# Patient Record
Sex: MALE | Race: WHITE | NOT HISPANIC OR LATINO | Employment: OTHER | URBAN - METROPOLITAN AREA
[De-identification: names, ages, dates, MRNs, and addresses within clinical notes are randomized per-mention and may not be internally consistent; named-entity substitution may affect disease eponyms.]

---

## 2023-01-31 PROBLEM — R73.01 IFG (IMPAIRED FASTING GLUCOSE): Status: ACTIVE | Noted: 2023-01-31

## 2023-01-31 PROBLEM — H61.20 CERUMEN IMPACTION: Status: ACTIVE | Noted: 2023-01-31

## 2023-01-31 PROBLEM — E66.3 OVERWEIGHT WITH BODY MASS INDEX (BMI) OF 28 TO 28.9 IN ADULT: Status: ACTIVE | Noted: 2023-01-31

## 2023-01-31 PROBLEM — R00.2 PALPITATIONS: Status: ACTIVE | Noted: 2023-01-31

## 2023-01-31 PROBLEM — G47.34 SLEEP-RELATED HYPOXIA: Status: ACTIVE | Noted: 2023-01-31

## 2023-01-31 PROBLEM — E78.2 ELEVATED TRIGLYCERIDES WITH HIGH CHOLESTEROL: Status: ACTIVE | Noted: 2023-01-31

## 2023-01-31 PROBLEM — J06.9 URTI (ACUTE UPPER RESPIRATORY INFECTION): Status: ACTIVE | Noted: 2023-01-31

## 2023-01-31 PROBLEM — G56.22 NEURITIS OF LEFT ULNAR NERVE: Status: ACTIVE | Noted: 2023-01-31

## 2023-01-31 PROBLEM — L70.9 ACNE: Status: ACTIVE | Noted: 2023-01-31

## 2023-01-31 PROBLEM — I25.84 CALCIFICATION OF CORONARY ARTERY: Status: ACTIVE | Noted: 2023-01-31

## 2023-01-31 PROBLEM — R93.1 AGATSTON CORONARY ARTERY CALCIUM SCORE BETWEEN 200 AND 399: Status: ACTIVE | Noted: 2023-01-31

## 2023-01-31 PROBLEM — R93.89 ABNORMAL CHEST XRAY: Status: ACTIVE | Noted: 2023-01-31

## 2023-01-31 PROBLEM — R73.03 PRE-DIABETES: Status: ACTIVE | Noted: 2023-01-31

## 2023-01-31 PROBLEM — H61.23 EXCESSIVE EAR WAX, BILATERAL: Status: ACTIVE | Noted: 2023-01-31

## 2023-01-31 PROBLEM — H91.90 HEARING LOSS: Status: ACTIVE | Noted: 2023-01-31

## 2023-01-31 PROBLEM — Z86.39 HISTORY OF IRON DEFICIENCY: Status: ACTIVE | Noted: 2023-01-31

## 2023-01-31 PROBLEM — G47.61 PERIODIC LIMB MOVEMENTS OF SLEEP: Status: ACTIVE | Noted: 2023-01-31

## 2023-01-31 PROBLEM — N40.1 BENIGN PROSTATIC HYPERPLASIA (BPH) WITH URINARY URGENCY: Status: ACTIVE | Noted: 2023-01-31

## 2023-01-31 PROBLEM — E78.00 HYPERCHOLESTEROLEMIA: Status: ACTIVE | Noted: 2023-01-31

## 2023-01-31 PROBLEM — G47.33 OSA ON CPAP: Status: ACTIVE | Noted: 2023-01-31

## 2023-01-31 PROBLEM — I25.10 CALCIFICATION OF CORONARY ARTERY: Status: ACTIVE | Noted: 2023-01-31

## 2023-01-31 PROBLEM — L40.9 PSORIASIS: Status: ACTIVE | Noted: 2023-01-31

## 2023-01-31 PROBLEM — F32.0 DEPRESSION, MAJOR, SINGLE EPISODE, MILD (CMS-HCC): Status: ACTIVE | Noted: 2023-01-31

## 2023-01-31 PROBLEM — H69.93 EUSTACHIAN TUBE DYSFUNCTION, BILATERAL: Status: ACTIVE | Noted: 2023-01-31

## 2023-01-31 PROBLEM — J32.4 CHRONIC PANSINUSITIS: Status: ACTIVE | Noted: 2023-01-31

## 2023-01-31 PROBLEM — R35.0 URINARY FREQUENCY: Status: ACTIVE | Noted: 2023-01-31

## 2023-01-31 PROBLEM — H90.3 SENSORINEURAL HEARING LOSS (SNHL) OF BOTH EARS: Status: ACTIVE | Noted: 2023-01-31

## 2023-01-31 PROBLEM — R73.09 ELEVATED HEMOGLOBIN A1C: Status: ACTIVE | Noted: 2023-01-31

## 2023-01-31 PROBLEM — I49.3 PVC (PREMATURE VENTRICULAR CONTRACTION): Status: ACTIVE | Noted: 2023-01-31

## 2023-01-31 PROBLEM — J30.9 ALLERGIC RHINITIS: Status: ACTIVE | Noted: 2023-01-31

## 2023-01-31 PROBLEM — R31.29 OTHER MICROSCOPIC HEMATURIA: Status: ACTIVE | Noted: 2023-01-31

## 2023-01-31 PROBLEM — M19.90 ARTHRITIS: Status: ACTIVE | Noted: 2023-01-31

## 2023-01-31 PROBLEM — G47.00 INSOMNIA: Status: ACTIVE | Noted: 2023-01-31

## 2023-01-31 PROBLEM — K21.9 ESOPHAGEAL REFLUX: Status: ACTIVE | Noted: 2023-01-31

## 2023-01-31 PROBLEM — H90.3 ASYMMETRIC SNHL (SENSORINEURAL HEARING LOSS): Status: ACTIVE | Noted: 2023-01-31

## 2023-01-31 PROBLEM — R39.15 BENIGN PROSTATIC HYPERPLASIA (BPH) WITH URINARY URGENCY: Status: ACTIVE | Noted: 2023-01-31

## 2023-01-31 PROBLEM — L30.9 ECZEMA OF HAND: Status: ACTIVE | Noted: 2023-01-31

## 2023-01-31 PROBLEM — F41.9 ANXIETY DISORDER: Status: ACTIVE | Noted: 2023-01-31

## 2023-01-31 PROBLEM — J30.89 ENVIRONMENTAL AND SEASONAL ALLERGIES: Status: ACTIVE | Noted: 2023-01-31

## 2023-01-31 PROBLEM — K13.79 ACQUIRED ELONGATED UVULA: Status: ACTIVE | Noted: 2023-01-31

## 2023-01-31 PROBLEM — R79.89 LOW VITAMIN B12 LEVEL: Status: ACTIVE | Noted: 2023-01-31

## 2023-01-31 PROBLEM — E66.3 OVERWEIGHT WITH BODY MASS INDEX (BMI) OF 27 TO 27.9 IN ADULT: Status: ACTIVE | Noted: 2023-01-31

## 2023-01-31 RX ORDER — ASPIRIN 81 MG/1
81 TABLET ORAL DAILY
COMMUNITY

## 2023-01-31 RX ORDER — CLOBETASOL PROPIONATE 0.5 MG/G
CREAM TOPICAL 2 TIMES DAILY
COMMUNITY
Start: 2021-04-06 | End: 2023-10-18

## 2023-01-31 RX ORDER — CHOLECALCIFEROL (VITAMIN D3) 25 MCG
1 TABLET,CHEWABLE ORAL
COMMUNITY

## 2023-01-31 RX ORDER — FLUTICASONE PROPIONATE 50 MCG
2 SPRAY, SUSPENSION (ML) NASAL 2 TIMES DAILY
COMMUNITY
Start: 2017-10-05 | End: 2024-02-26 | Stop reason: SDUPTHER

## 2023-01-31 RX ORDER — ATORVASTATIN CALCIUM 40 MG/1
40 TABLET, FILM COATED ORAL NIGHTLY
COMMUNITY
Start: 2021-10-28 | End: 2023-07-10 | Stop reason: SDUPTHER

## 2023-01-31 RX ORDER — PSYLLIUM HUSK/CALCIUM CARB 1 G-60 MG
5 CAPSULE ORAL DAILY
COMMUNITY
Start: 2018-11-14

## 2023-01-31 RX ORDER — OMEGA-3-ACID ETHYL ESTERS 1 G/1
4 CAPSULE, LIQUID FILLED ORAL DAILY
COMMUNITY
End: 2023-07-10 | Stop reason: SDUPTHER

## 2023-01-31 RX ORDER — CALC/MAG/B COMPLEX/D3/HERB 61
15 TABLET ORAL EVERY OTHER DAY
COMMUNITY
Start: 2022-02-25 | End: 2024-02-26 | Stop reason: SDUPTHER

## 2023-01-31 RX ORDER — FAMOTIDINE 40 MG/1
TABLET, FILM COATED ORAL
COMMUNITY
Start: 2021-09-02

## 2023-01-31 RX ORDER — FLUOXETINE HYDROCHLORIDE 40 MG/1
40 CAPSULE ORAL DAILY
COMMUNITY
Start: 2011-04-27 | End: 2023-10-18 | Stop reason: SDUPTHER

## 2023-01-31 RX ORDER — TAMSULOSIN HYDROCHLORIDE 0.4 MG/1
0.4 CAPSULE ORAL DAILY
COMMUNITY
Start: 2021-08-31

## 2023-01-31 RX ORDER — TRAZODONE HYDROCHLORIDE 50 MG/1
50 TABLET ORAL NIGHTLY
COMMUNITY
Start: 2013-11-20 | End: 2023-07-10 | Stop reason: SDUPTHER

## 2023-01-31 RX ORDER — METOPROLOL TARTRATE 25 MG/1
0.5 TABLET, FILM COATED ORAL 2 TIMES DAILY
COMMUNITY
End: 2023-07-10 | Stop reason: SDUPTHER

## 2023-01-31 RX ORDER — FLUOXETINE 10 MG/1
10 CAPSULE ORAL DAILY
COMMUNITY
Start: 2021-04-06 | End: 2023-03-14 | Stop reason: SDUPTHER

## 2023-03-09 LAB
ALANINE AMINOTRANSFERASE (SGPT) (U/L) IN SER/PLAS: 15 U/L (ref 10–52)
ALBUMIN (G/DL) IN SER/PLAS: 4.4 G/DL (ref 3.4–5)
ALKALINE PHOSPHATASE (U/L) IN SER/PLAS: 53 U/L (ref 33–136)
ANION GAP IN SER/PLAS: 11 MMOL/L (ref 10–20)
ASPARTATE AMINOTRANSFERASE (SGOT) (U/L) IN SER/PLAS: 13 U/L (ref 9–39)
BILIRUBIN TOTAL (MG/DL) IN SER/PLAS: 0.8 MG/DL (ref 0–1.2)
CALCIUM (MG/DL) IN SER/PLAS: 9.3 MG/DL (ref 8.6–10.6)
CARBON DIOXIDE, TOTAL (MMOL/L) IN SER/PLAS: 31 MMOL/L (ref 21–32)
CHLORIDE (MMOL/L) IN SER/PLAS: 102 MMOL/L (ref 98–107)
CHOLESTEROL (MG/DL) IN SER/PLAS: 132 MG/DL (ref 0–199)
CHOLESTEROL IN HDL (MG/DL) IN SER/PLAS: 36 MG/DL
CHOLESTEROL/HDL RATIO: 3.7
COBALAMIN (VITAMIN B12) (PG/ML) IN SER/PLAS: 870 PG/ML (ref 211–911)
CREATININE (MG/DL) IN SER/PLAS: 0.73 MG/DL (ref 0.5–1.3)
ERYTHROCYTE DISTRIBUTION WIDTH (RATIO) BY AUTOMATED COUNT: 12.6 % (ref 11.5–14.5)
ERYTHROCYTE MEAN CORPUSCULAR HEMOGLOBIN CONCENTRATION (G/DL) BY AUTOMATED: 32.8 G/DL (ref 32–36)
ERYTHROCYTE MEAN CORPUSCULAR VOLUME (FL) BY AUTOMATED COUNT: 94 FL (ref 80–100)
ERYTHROCYTES (10*6/UL) IN BLOOD BY AUTOMATED COUNT: 4.62 X10E12/L (ref 4.5–5.9)
ESTIMATED AVERAGE GLUCOSE FOR HBA1C: 126 MG/DL
GFR MALE: >90 ML/MIN/1.73M2
GLUCOSE (MG/DL) IN SER/PLAS: 112 MG/DL (ref 74–99)
HEMATOCRIT (%) IN BLOOD BY AUTOMATED COUNT: 43.6 % (ref 41–52)
HEMOGLOBIN (G/DL) IN BLOOD: 14.3 G/DL (ref 13.5–17.5)
HEMOGLOBIN A1C/HEMOGLOBIN TOTAL IN BLOOD: 6 %
LDL: 61 MG/DL (ref 0–99)
LEUKOCYTES (10*3/UL) IN BLOOD BY AUTOMATED COUNT: 8.9 X10E9/L (ref 4.4–11.3)
NRBC (PER 100 WBCS) BY AUTOMATED COUNT: 0 /100 WBC (ref 0–0)
PLATELETS (10*3/UL) IN BLOOD AUTOMATED COUNT: 220 X10E9/L (ref 150–450)
POTASSIUM (MMOL/L) IN SER/PLAS: 4.3 MMOL/L (ref 3.5–5.3)
PROTEIN TOTAL: 6.8 G/DL (ref 6.4–8.2)
SODIUM (MMOL/L) IN SER/PLAS: 140 MMOL/L (ref 136–145)
THYROTROPIN (MIU/L) IN SER/PLAS BY DETECTION LIMIT <= 0.05 MIU/L: 1.45 MIU/L (ref 0.44–3.98)
TRIGLYCERIDE (MG/DL) IN SER/PLAS: 174 MG/DL (ref 0–149)
UREA NITROGEN (MG/DL) IN SER/PLAS: 12 MG/DL (ref 6–23)
VLDL: 35 MG/DL (ref 0–40)

## 2023-03-14 ENCOUNTER — OFFICE VISIT (OUTPATIENT)
Dept: PRIMARY CARE | Facility: CLINIC | Age: 73
End: 2023-03-14
Payer: MEDICARE

## 2023-03-14 VITALS
SYSTOLIC BLOOD PRESSURE: 100 MMHG | BODY MASS INDEX: 27.49 KG/M2 | DIASTOLIC BLOOD PRESSURE: 66 MMHG | WEIGHT: 192 LBS | HEART RATE: 60 BPM | RESPIRATION RATE: 18 BRPM | HEIGHT: 70 IN

## 2023-03-14 DIAGNOSIS — F32.0 DEPRESSION, MAJOR, SINGLE EPISODE, MILD (CMS-HCC): ICD-10-CM

## 2023-03-14 DIAGNOSIS — F41.9 ANXIETY DISORDER, UNSPECIFIED TYPE: ICD-10-CM

## 2023-03-14 DIAGNOSIS — E66.3 OVERWEIGHT WITH BODY MASS INDEX (BMI) OF 27 TO 27.9 IN ADULT: ICD-10-CM

## 2023-03-14 DIAGNOSIS — E78.2 ELEVATED TRIGLYCERIDES WITH HIGH CHOLESTEROL: ICD-10-CM

## 2023-03-14 DIAGNOSIS — R73.03 PRE-DIABETES: ICD-10-CM

## 2023-03-14 DIAGNOSIS — M75.51 BURSITIS OF RIGHT SHOULDER: ICD-10-CM

## 2023-03-14 DIAGNOSIS — R00.2 PALPITATIONS: ICD-10-CM

## 2023-03-14 DIAGNOSIS — R73.09 ELEVATED HEMOGLOBIN A1C: ICD-10-CM

## 2023-03-14 DIAGNOSIS — E78.00 HYPERCHOLESTEROLEMIA: ICD-10-CM

## 2023-03-14 DIAGNOSIS — K21.9 GASTROESOPHAGEAL REFLUX DISEASE, UNSPECIFIED WHETHER ESOPHAGITIS PRESENT: Primary | ICD-10-CM

## 2023-03-14 DIAGNOSIS — J06.9 VIRAL URI WITH COUGH: ICD-10-CM

## 2023-03-14 PROBLEM — J90 PLEURAL EFFUSION: Status: RESOLVED | Noted: 2023-03-14 | Resolved: 2023-03-14

## 2023-03-14 PROBLEM — U07.1 COVID-19 VIRUS INFECTION: Status: RESOLVED | Noted: 2023-03-14 | Resolved: 2023-03-14

## 2023-03-14 PROBLEM — J18.9 PNEUMONIA: Status: RESOLVED | Noted: 2023-03-14 | Resolved: 2023-03-14

## 2023-03-14 PROBLEM — R09.89 RALES: Status: RESOLVED | Noted: 2023-03-14 | Resolved: 2023-03-14

## 2023-03-14 PROBLEM — R06.2 WHEEZING: Status: RESOLVED | Noted: 2023-03-14 | Resolved: 2023-03-14

## 2023-03-14 PROCEDURE — 99215 OFFICE O/P EST HI 40 MIN: CPT | Performed by: INTERNAL MEDICINE

## 2023-03-14 PROCEDURE — 3008F BODY MASS INDEX DOCD: CPT | Performed by: INTERNAL MEDICINE

## 2023-03-14 PROCEDURE — 1160F RVW MEDS BY RX/DR IN RCRD: CPT | Performed by: INTERNAL MEDICINE

## 2023-03-14 PROCEDURE — 1159F MED LIST DOCD IN RCRD: CPT | Performed by: INTERNAL MEDICINE

## 2023-03-14 PROCEDURE — 1036F TOBACCO NON-USER: CPT | Performed by: INTERNAL MEDICINE

## 2023-03-14 RX ORDER — FLUOXETINE 10 MG/1
10 CAPSULE ORAL DAILY
Qty: 90 CAPSULE | Refills: 3 | Status: SHIPPED | OUTPATIENT
Start: 2023-03-14 | End: 2024-02-26 | Stop reason: SDUPTHER

## 2023-03-14 RX ORDER — MELOXICAM 7.5 MG/1
TABLET ORAL
Qty: 30 TABLET | Refills: 1 | Status: SHIPPED | OUTPATIENT
Start: 2023-03-14 | End: 2023-08-30 | Stop reason: SDUPTHER

## 2023-03-14 ASSESSMENT — PAIN SCALES - GENERAL: PAINLEVEL: 0-NO PAIN

## 2023-03-14 NOTE — PATIENT INSTRUCTIONS
For right shoulder: stop the voltaren gel for now. No advil. START meloxicam 7.5mg tabs: take one every day or 2 every day for 2 weeks.  If no improvement, call for ortho referral.    See if meloxicam helps the gluteal pain on the left as well.  Switch wallet away from that left rear pocket.    Follow up annual wellness/physical and recheck on meds in 4 months. July is ok for this.  Call if above issues are not improved.

## 2023-03-14 NOTE — PROGRESS NOTES
"Subjective   Patient ID: Elpidio Jamil is a 73 y.o. male who presents for Follow-up (Pt here for 3-4 month follow up. Pt had labs Thursday. ).    HPI     F/up [facility] SCRN 11/14/22, DERM 12/21/22, EYE EVAL 12/9/22     n bp, pre dm, depression/anxiety.  Had uri, better now.  Wife was ill and coughing and she was ill from the grandkids but he did not get as ill as hshe did. She was neg for covid.    He had covid [id number]--cm denies sequelae.    Gerd-had mid day indigestion not long ago, he called dr mooney. Pt was on prevacid and dr mooney had tried to get him off by doing one every other day of the 15mg : after breakthrough symptoms he increased it to one per day for a few days at that time and it helped.  He usually is ok with just taking famotidine nightly and ppi every 4 days or so.   Right shoulder-has been bothering him for a long time off and on and more so recently. 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   Cataract surgery both eyes. Dr barrios. Jan 24 od, feb 7th os.  Labs reviewed.no symptoms of hypo or hyperglycemia.    Lab review:chol ok, TG slightly elevated at 174. Tsh euthyroid.  Cmp and cbc ok. See below.  Scribe Transcription:    He reports having a cough over the past few weeks in the mornings. It is ipmrving.    He denies any sleeping issues.    He states he had some reflux issues recently and called Dr. Viramontes about it. He is taking  famotidine nightly and prevacid every few days.   Lower b12 levels and ppi use:He continues to take B12.     He states his mood and anxiety have been okay on current dose of fluoxetine..    He recently had cataract surgery. OD on 1/24/23, OS 2/7/23. Went well.    He has tenderness in his right acromioclavicular bursa.       Review of Systems  [facility] SCRN 11/14/22--next 3 years., DERM 12/21/22, EYE EVAL 12/9/22  Objective   /66 (BP Location: Left arm, Patient Position: Sitting, BP Cuff Size: Adult)   Pulse 60   Resp 18   Ht 1.778 m (5' 10\")   Wt 87.1 kg (192 lb)   BMI " 27.55 kg/m²     Lab recent:   Latest Reference Range & Units 03/09/23 07:34   GLUCOSE 74 - 99 mg/dL 112 (H)   SODIUM 136 - 145 mmol/L 140   POTASSIUM 3.5 - 5.3 mmol/L 4.3   CHLORIDE 98 - 107 mmol/L 102   Bicarbonate 21 - 32 mmol/L 31   Anion Gap 10 - 20 mmol/L 11   Blood Urea Nitrogen 6 - 23 mg/dL 12   Creatinine 0.50 - 1.30 mg/dL 0.73   Calcium 8.6 - 10.6 mg/dL 9.3   Albumin 3.4 - 5.0 g/dL 4.4   Alkaline Phosphatase 33 - 136 U/L 53   ALT 10 - 52 U/L 15   AST 9 - 39 U/L 13   Bilirubin Total 0.0 - 1.2 mg/dL 0.8   HDL CHOLESTEROL mg/dL 36.0 !   Cholesterol/HDL Ratio  3.7   VLDL 0 - 40 mg/dL 35   TRIGLYCERIDES 0 - 149 mg/dL 174 (H)   Total Protein 6.4 - 8.2 g/dL 6.8   CHOLESTEROL 0 - 199 mg/dL 132   GFR MALE >90 mL/min/1.73m2 >90   Vitamin B12 211 - 911 pg/mL 870   Hemoglobin A1C % 6.0 !   Thyroid Stimulating Hormone 0.44 - 3.98 mIU/L 1.45   Estimated Average Glucose MG/   WBC 4.4 - 11.3 x10E9/L 8.9   RBC 4.50 - 5.90 x10E12/L 4.62   HEMOGLOBIN 13.5 - 17.5 g/dL 14.3   HEMATOCRIT 41.0 - 52.0 % 43.6   MCV 80 - 100 fL 94   MCHC 32.0 - 36.0 g/dL 32.8   Platelets 150 - 450 x10E9/L 220   nRBC 0.0 - 0.0 /100 WBC 0.0   RED CELL DISTRIBUTION WIDTH 11.5 - 14.5 % 12.6   LDL 0 - 99 mg/dL 61   (H): Data is abnormally high  !: Data is abnormal  Physical Exam  Patient looks well and is in no obvious distress.  HEENT:   Eyes: Sclera nonicteric,  conjunctiva clear. Pupils equal round.  Neck: No adenopathy cervical (Ant/post/lat), no Supraclavicular nodes.   Lungs : RR normal. Clear to auscultation anterior, posterior and lateral. No rales, wheezes rhonchi or rubs. Good air exchange.  Heart: RRR. No Murmur, gallop, click or rub.  Extremities: no upper extremity edema. No lower extremity edema.   Neuro: CN 2-12 intact. Alert, appropriate.   Ambulates independently.  No gross motor deficit.   No tremors.  Psych: normal mood and affect.  Skin: No rash, bruising petechiae or jaundice.   Right shoulder with swellig over ac bursa not red  or warm but moderate and not on theleft side, mild tenderness over bicipital tendon anteriorly. ROM is good EXCEPT abduction past 90 degrees is mildly limited to the side.   Can reach aroundto back and across to other shoulder.  No weakness. Radial pulse good. No arm swelling.      Assessment/Plan   Problem List Items Addressed This Visit          Circulatory    RESOLVED: Palpitations       Digestive    Esophageal reflux - Primary--continue famotidine and prn ppi every few days. Call here or gi if fails this. Weight loss would help, follow gerd protocol re eating. (Avoid reclining within 2 hours of eating, do not overeat).       Musculoskeletal    Bursitis of right shoulder    Relevant Medications    meloxicam (Mobic) 7.5 mg tablet  Refer ortho if not improved.       Endocrine/Metabolic    Pre-diabetes stable a1c of 6.0 .watch diet, exercise    Overweight with body mass index (BMI) of 27 to 27.9 in adult       Hematologic    Elevated hemoglobin A1c-see endocrine       Other    Anxiety disorder    Relevant Medications    FLUoxetine (PROzac) 10 mg capsule    Depression, major, single episode, mild (CMS/HCC)    Relevant Medications    FLUoxetine (PROzac) 10 mg capsule  Anxiety and depression stable on current dose, only the 10mg needs refilled    Elevated triglycerides with high cholesterol  Ldl higher than preferred, d/w pt, no med changes at this time-weight loss exercise better diet would help.    Hypercholesterolemia   Uri with cough, call if cough does not resolve

## 2023-07-08 NOTE — PROGRESS NOTES
Subjective   Reason for Visit: Elpidio Jamil is an 73 y.o. male here for a Medicare Wellness visit. , annual physical/follow up on conditions.    Past Medical, Surgical, and Family History reviewed and updated in chart.    Reviewed all medications by prescribing practitioner or clinical pharmacist (such as prescriptions, OTCs, herbal therapies and supplements) and documented in the medical record.    HPI  Here for Medicare Wellness visit. , annual physical/follow up on conditions.  Medicare questions reviewed, code status (full) discussed.    Health maintenance items last completed:  Colonoscopy: November 2022, dr mooney, several tubular adenomas, a sessile serrated adenoma, shorter follow up -3 years.  PSA August 2022-will do with urology at sept visit.  Vaccines due or not completed: all utd and tdap is current.  [facility] oct 2022 cardiology, est ok 2021.    [facility] 16 track laps=2 miles, brisk walk. Runs the last lap. Weight lifting  leg presses.  Still shoulder issue right even last visit. Meds he took helped a lot but di dnot completely go away, last night sore and ached to his elbow.  Worried about his heart but gone this am and exercised today and no issues.   No restrictions of mobility and it is not weak.    Left foot, all the toes across the top of the toes happens when he exercises and keeps going and it gets better. That has been going on since last visit here.  Last night got up out of chair to go upstairs and it started but then went away. Does not wake him up. No color change.    Patient Care Team:  Katiuska Helm MD as PCP - General  Katiuska Helm MD as PCP - [employer] PCP   Philip mantilla cardiology for jump in cac score, saw him fall 2022, roberto carlos one year.  Review of Systems  All systems reviewed and are negative unless otherwise stated in HPI.   Review of systems, written document, scanned in to office visit.  I reviewed 7 page history and review of  "systems form.    Objective   === 08/30/22 ===    CHEST 2 VIEW    - Impression -  Small right pleural effusion with associated atelectasis and/or  pneumonitis.    Ct chest \"djd\" commented.       Latest Reference Range & Units 03/09/23 07:34   GLUCOSE 74 - 99 mg/dL 112 (H)   SODIUM 136 - 145 mmol/L 140   POTASSIUM 3.5 - 5.3 mmol/L 4.3   CHLORIDE 98 - 107 mmol/L 102   Bicarbonate 21 - 32 mmol/L 31   Anion Gap 10 - 20 mmol/L 11   Blood Urea Nitrogen 6 - 23 mg/dL 12   Creatinine 0.50 - 1.30 mg/dL 0.73   Calcium 8.6 - 10.6 mg/dL 9.3   Albumin 3.4 - 5.0 g/dL 4.4   Alkaline Phosphatase 33 - 136 U/L 53   ALT 10 - 52 U/L 15   AST 9 - 39 U/L 13   Bilirubin Total 0.0 - 1.2 mg/dL 0.8   HDL CHOLESTEROL mg/dL 36.0 !   Cholesterol/HDL Ratio  3.7   VLDL 0 - 40 mg/dL 35   TRIGLYCERIDES 0 - 149 mg/dL 174 (H)   Total Protein 6.4 - 8.2 g/dL 6.8   CHOLESTEROL 0 - 199 mg/dL 132   LDL 0 - 99 mg/dL 61   GFR MALE >90 mL/min/1.73m2 >90   Vitamin B12 211 - 911 pg/mL 870   Hemoglobin A1C % 6.0 !   Thyroid Stimulating Hormone 0.44 - 3.98 mIU/L 1.45   Estimated Average Glucose MG/   WBC 4.4 - 11.3 x10E9/L 8.9   RBC 4.50 - 5.90 x10E12/L 4.62   HEMOGLOBIN 13.5 - 17.5 g/dL 14.3   HEMATOCRIT 41.0 - 52.0 % 43.6   MCV 80 - 100 fL 94   MCHC 32.0 - 36.0 g/dL 32.8   Platelets 150 - 450 x10E9/L 220   nRBC 0.0 - 0.0 /100 WBC 0.0   RED CELL DISTRIBUTION WIDTH 11.5 - 14.5 % 12.6     Lab Results   Component Value Date    PSA 0.21 08/30/2022    PSA 0.21 09/23/2021    PSA 0.17 08/05/2021      Vitals:  /65   Pulse 66   Resp 22   Ht 1.753 m (5' 9\")   Wt 88.6 kg (195 lb 6.4 oz)   BMI 28.86 kg/m²         8/2/2022     7:14 PM 8/3/2022    11:55 AM 8/16/2022     1:32 PM 10/25/2022     9:21 AM 11/10/2022     9:41 AM 3/14/2023     2:31 PM 7/10/2023     1:43 PM   Vitals   Systolic 123 123 106 113 104 100 106   Diastolic 66 80 68 73 68 66 65   Heart Rate 71 59 68 56 60 60 66   Temp 36.7 °C (98.1 °F) 37.1 °C (98.7 °F)        Resp 14  16  16 18 22   Height " "(in)   1.778 m (5' 10\") 1.778 m (5' 10\")  1.778 m (5' 10\") 1.753 m (5' 9\")   Weight (lb) 194  194 192 191 192 195.4   BMI 27.84 kg/m2  27.84 kg/m2 27.55 kg/m2 27.41 kg/m2 27.55 kg/m2 28.86 kg/m2   BSA (m2) 2.08 m2  2.08 m2 2.07 m2 2.07 m2 2.07 m2 2.08 m2   Visit Report      Report Report         Physical Exam  Musculoskeletal:      Right shoulder: Normal. No swelling, deformity, effusion, laceration, tenderness, bony tenderness or crepitus. Normal range of motion. Normal strength. Normal pulse.      Comments: Has from right shoulder. Has some discomfort near ac joint reaching around to the back.   Skin:            Comments: Red/pink is dry more macular rash, none on back.scattered bilateral trunk, larger right mid abdomen was biopsied by adriana bui at [facility] and they told him psoriasis'    Blue areas are dry scaley thicker  Sometimes has this on palms on right hand but not now. Never dorsum.    Excellent hair growth feet and toes.     Normal sensation light touch bilateral feet and toes.    ---------  General: Patient is known to me, looks well, is in usual state of health, with  no obvious distress.  Head: normocephalic  Eyes: PERRL, EOMI, no scleral icterus or conjunctival abnormality  Ears:Normal canals and TM's  Oropharynx: Well hydrated mucosa. no lesions, erythema. palate moves well.  Neck: No masses, no cervical (A/P/ or Lateral) adenopathy. Thyroid not enlarged and no nodules. Carotid pulses normal and no bruits.  Chest: Normal AP diameter. No supraclavicular adenopathy.  Lungs: Clear to auscultation: no rales , wheezes, rhonchi, rubs, examined bilaterally, ant/post /lateral. RR normal.  Heart: RRR. No MGCR S1 S2 normal.  Abd: BS normal, no bruits. No hepatosplenomegaly. No abdominal mass or tenderness. No distension.  Extremities: No upper extremity edema. No lower leg edema.No ischemia.   Joints: no synovitis seen. No deformities.  Pulses: normal posterior tibialis pulses, normal dorsalis pedis pulses. " normal radial pulses. Normal femoral pulses without bruits.  Neuro: CN 2-12 intact . Alert, oriented, appropriate.  No focal weakness observed. No tremors. Ambulation is normal .  Psych: Mood and affect normal. No cognitive deficits noted during this exam. Alert, oriented, appropriate.  Skin: No rash, petechiae or excessive bruising.  Lymph: no SC axillary or inguinal adenopathy   feet    Assessment/Plan   Problem List Items Addressed This Visit       Anxiety disorder    Benign prostatic hyperplasia (BPH) with urinary urgency    Calcification of coronary artery    Depression, major, single episode, mild (CMS/HCC)    Elevated triglycerides with high cholesterol    Insomnia    Pre-diabetes     Other Visit Diagnoses       Encounter for subsequent annual wellness visit (AWV) in Medicare patient    -  Primary    Full code status        Encounter for annual physical exam        Encounter for screening for other disorder        Hypertension, unspecified type                Annual  history and physical completed including  ROS, PE.   Medicare wellness visit  completed including:  Immunizations,   Health Maintenance items,  Discussion of advanced directives and code status, which is: full code  Fall risk and prevention addressed.  Functionality and pain were assessed.   Cancer screening testing was addressed as appropriate-see patient discussion/orders.   Medications were discussed and reviewed/reconciled and refill need assessed/handled.   Patient states taking their medication regularly and appropriately.  Also:   Depression screening PhQ-2 completed: pt is treated for anxiety and depression  Alcohol misuse screen: negative.    In addition to the wellness visit and screening, the above medical issues were addressed:

## 2023-07-10 ENCOUNTER — OFFICE VISIT (OUTPATIENT)
Dept: PRIMARY CARE | Facility: CLINIC | Age: 73
End: 2023-07-10
Payer: MEDICARE

## 2023-07-10 VITALS
HEART RATE: 66 BPM | DIASTOLIC BLOOD PRESSURE: 65 MMHG | HEIGHT: 69 IN | RESPIRATION RATE: 22 BRPM | SYSTOLIC BLOOD PRESSURE: 106 MMHG | BODY MASS INDEX: 28.94 KG/M2 | WEIGHT: 195.4 LBS

## 2023-07-10 DIAGNOSIS — Z00.00 ENCOUNTER FOR SUBSEQUENT ANNUAL WELLNESS VISIT (AWV) IN MEDICARE PATIENT: Primary | ICD-10-CM

## 2023-07-10 DIAGNOSIS — Z00.01 ANNUAL VISIT FOR GENERAL ADULT MEDICAL EXAMINATION WITH ABNORMAL FINDINGS: ICD-10-CM

## 2023-07-10 DIAGNOSIS — M25.511 CHRONIC RIGHT SHOULDER PAIN: ICD-10-CM

## 2023-07-10 DIAGNOSIS — F32.0 DEPRESSION, MAJOR, SINGLE EPISODE, MILD (CMS-HCC): ICD-10-CM

## 2023-07-10 DIAGNOSIS — N40.1 BENIGN PROSTATIC HYPERPLASIA (BPH) WITH URINARY URGENCY: ICD-10-CM

## 2023-07-10 DIAGNOSIS — Z13.89 ENCOUNTER FOR SCREENING FOR OTHER DISORDER: ICD-10-CM

## 2023-07-10 DIAGNOSIS — Z00.00 ENCOUNTER FOR ANNUAL PHYSICAL EXAM: ICD-10-CM

## 2023-07-10 DIAGNOSIS — G47.00 INSOMNIA, UNSPECIFIED TYPE: ICD-10-CM

## 2023-07-10 DIAGNOSIS — R25.2 CRAMP OF TOE: ICD-10-CM

## 2023-07-10 DIAGNOSIS — F41.9 ANXIETY DISORDER, UNSPECIFIED TYPE: ICD-10-CM

## 2023-07-10 DIAGNOSIS — E78.2 ELEVATED TRIGLYCERIDES WITH HIGH CHOLESTEROL: ICD-10-CM

## 2023-07-10 DIAGNOSIS — R39.15 BENIGN PROSTATIC HYPERPLASIA (BPH) WITH URINARY URGENCY: ICD-10-CM

## 2023-07-10 DIAGNOSIS — R25.2 FOOT CRAMPS: ICD-10-CM

## 2023-07-10 DIAGNOSIS — R73.03 PRE-DIABETES: ICD-10-CM

## 2023-07-10 DIAGNOSIS — I25.10 CALCIFICATION OF CORONARY ARTERY: ICD-10-CM

## 2023-07-10 DIAGNOSIS — R21 RASH: ICD-10-CM

## 2023-07-10 DIAGNOSIS — Z78.9 FULL CODE STATUS: ICD-10-CM

## 2023-07-10 DIAGNOSIS — G89.29 CHRONIC RIGHT SHOULDER PAIN: ICD-10-CM

## 2023-07-10 DIAGNOSIS — I25.84 CALCIFICATION OF CORONARY ARTERY: ICD-10-CM

## 2023-07-10 DIAGNOSIS — I10 HYPERTENSION, UNSPECIFIED TYPE: ICD-10-CM

## 2023-07-10 LAB — POC HEMOGLOBIN A1C: 6.5 % (ref 4.2–6.5)

## 2023-07-10 PROCEDURE — 99397 PER PM REEVAL EST PAT 65+ YR: CPT | Performed by: INTERNAL MEDICINE

## 2023-07-10 PROCEDURE — 1170F FXNL STATUS ASSESSED: CPT | Performed by: INTERNAL MEDICINE

## 2023-07-10 PROCEDURE — 1125F AMNT PAIN NOTED PAIN PRSNT: CPT | Performed by: INTERNAL MEDICINE

## 2023-07-10 PROCEDURE — 99213 OFFICE O/P EST LOW 20 MIN: CPT | Performed by: INTERNAL MEDICINE

## 2023-07-10 PROCEDURE — 1123F ACP DISCUSS/DSCN MKR DOCD: CPT | Performed by: INTERNAL MEDICINE

## 2023-07-10 PROCEDURE — 1159F MED LIST DOCD IN RCRD: CPT | Performed by: INTERNAL MEDICINE

## 2023-07-10 PROCEDURE — 1036F TOBACCO NON-USER: CPT | Performed by: INTERNAL MEDICINE

## 2023-07-10 PROCEDURE — 3074F SYST BP LT 130 MM HG: CPT | Performed by: INTERNAL MEDICINE

## 2023-07-10 PROCEDURE — 3008F BODY MASS INDEX DOCD: CPT | Performed by: INTERNAL MEDICINE

## 2023-07-10 PROCEDURE — 1160F RVW MEDS BY RX/DR IN RCRD: CPT | Performed by: INTERNAL MEDICINE

## 2023-07-10 PROCEDURE — 83036 HEMOGLOBIN GLYCOSYLATED A1C: CPT | Performed by: INTERNAL MEDICINE

## 2023-07-10 PROCEDURE — G0442 ANNUAL ALCOHOL SCREEN 15 MIN: HCPCS | Performed by: INTERNAL MEDICINE

## 2023-07-10 PROCEDURE — G0439 PPPS, SUBSEQ VISIT: HCPCS | Performed by: INTERNAL MEDICINE

## 2023-07-10 PROCEDURE — 3078F DIAST BP <80 MM HG: CPT | Performed by: INTERNAL MEDICINE

## 2023-07-10 RX ORDER — METOPROLOL TARTRATE 25 MG/1
12.5 TABLET, FILM COATED ORAL 2 TIMES DAILY
Qty: 90 TABLET | Refills: 3 | Status: SHIPPED | OUTPATIENT
Start: 2023-07-10 | End: 2023-10-18 | Stop reason: SINTOL

## 2023-07-10 RX ORDER — ATORVASTATIN CALCIUM 40 MG/1
40 TABLET, FILM COATED ORAL NIGHTLY
Qty: 90 TABLET | Refills: 3 | Status: SHIPPED | OUTPATIENT
Start: 2023-07-10 | End: 2024-05-14

## 2023-07-10 RX ORDER — TRAZODONE HYDROCHLORIDE 50 MG/1
50 TABLET ORAL NIGHTLY
Qty: 90 TABLET | Refills: 3 | Status: SHIPPED | OUTPATIENT
Start: 2023-07-10 | End: 2023-10-18

## 2023-07-10 RX ORDER — OMEGA-3-ACID ETHYL ESTERS 1 G/1
4 CAPSULE, LIQUID FILLED ORAL DAILY
Qty: 360 CAPSULE | Refills: 3 | Status: SHIPPED | OUTPATIENT
Start: 2023-07-10 | End: 2024-07-04

## 2023-07-10 ASSESSMENT — ENCOUNTER SYMPTOMS
OCCASIONAL FEELINGS OF UNSTEADINESS: 0
LOSS OF SENSATION IN FEET: 0
DEPRESSION: 0

## 2023-07-10 ASSESSMENT — ACTIVITIES OF DAILY LIVING (ADL)
GROOMING: INDEPENDENT
JUDGMENT_ADEQUATE_SAFELY_COMPLETE_DAILY_ACTIVITIES: YES
PATIENT'S MEMORY ADEQUATE TO SAFELY COMPLETE DAILY ACTIVITIES?: YES
HEARING - RIGHT EAR: HEARING AID
FEEDING YOURSELF: INDEPENDENT
TOILETING: INDEPENDENT
BATHING: INDEPENDENT
HEARING - LEFT EAR: HEARING AID
ASSISTIVE_DEVICE: EYEGLASSES;HEARING AID - RIGHT;HEARING AID - LEFT
DRESSING YOURSELF: INDEPENDENT
WALKS IN HOME: INDEPENDENT
ADEQUATE_TO_COMPLETE_ADL: YES

## 2023-07-10 ASSESSMENT — PATIENT HEALTH QUESTIONNAIRE - PHQ9
1. LITTLE INTEREST OR PLEASURE IN DOING THINGS: NOT AT ALL
2. FEELING DOWN, DEPRESSED OR HOPELESS: NOT AT ALL
SUM OF ALL RESPONSES TO PHQ9 QUESTIONS 1 AND 2: 0

## 2023-07-10 ASSESSMENT — PAIN SCALES - GENERAL: PAINLEVEL: 2

## 2023-07-10 NOTE — PATIENT INSTRUCTIONS
Thank you for coming in for your annual.  Keep appointment with dermatology, let me know if you would prefer a second opinion with dermatology.  Ask about an ointment for the finger issue, different than the cream for the spots on your trunk.    Shoulder and cervical spine xray.    Schedule PRIYANKA testing with exercise in vascular lab.  If this is negative, then lumbar spine xray and nerve conduction test of the legs. Call .    Follow up 3 months.  Flu shot October.

## 2023-07-10 NOTE — PROGRESS NOTES
Subjective   Reason for Visit: Elpidio Jamil is an 73 y.o. male here for a Medicare Wellness visit.     Past Medical, Surgical, and Family History reviewed and updated in chart.  Reviewed all medications by prescribing practitioner or clinical pharmacist (such as prescriptions, OTCs, herbal therapies and supplements) and documented in the medical record.      HPI Mr. Jamil is here for his annual wellness visit, annual physical, and follow-up on GERD hyperlipidemia prediabetes.  He has some new issues with cramping and discomfort in toes on his left foot and also some issues with his right shoulder.  Diet reviewed.HPI  Here for Medicare Wellness visit. , annual physical/follow up on conditions.  Medicare questions reviewed, code status (full) discussed.    Health maintenance items last completed:  Colonoscopy: November 2022, dr mooney, several tubular adenomas, a sessile serrated adenoma, shorter follow up -3 years.  PSA August 2022-will do with urology at sept visit.  Vaccines due or not completed: all utd and tdap is current.  [facility] oct 2022 cardiology, est ok 2021.     [facility] 16 [facility] laps=2 miles, brisk walk. Runs the last lap. Weight lifting  leg presses.  Still shoulder issue right even last visit. Meds he took helped a lot but di dnot completely go away, last night sore and ached to his elbow.  Worried about his heart but gone this am and exercised today and no issues.   No restrictions of mobility and it is not weak.     Left foot, all the toes across the top of the toes happens when he exercises and keeps going and it gets better. That has been going on since last visit here.  Last night got up out of chair to go upstairs and it started but then went away. Does not wake him up. No color change.  He also has a rash on his trunk that the steroid cream helped it was diagnosed as psoriasis by dermatology.  It is also flared though in the last couple weeks.    I reviewed his review  of systems on her scanned paper document that he completed today.  Scribe Transcription:  He has cramping and discomfort in his toes on his left foot and right shoulder. He reports pain across all of his toes on his left foot when he is exercising. He states he also felt them cramping up when he was getting up from a chair last night.     He reports having 3 cups of coffee per day.     He has a macular skin rash on his trunk that is scattered and then he has some cracking and a rash on his finger and thumb that is likely psoriasis. This is also in his nails. He states this showed up within the past two weeks.     His swelling was over the AC bursa and it was moderate with mild tenderness over the bicipital tendon last time.  It has resolved.    Scribe Attestation  By signing my name below, I, Leo Leblanc   attest that this documentation has been prepared under the direction and in the presence of Katiuska Helm MD.        Review of Systems  All systems reviewed and are negative unless otherwise stated in HPI.   Review of systems, written document, scanned in to office visit.  I reviewed 7 page history and review of systems form.    Objective   A1c today is 6.5 putting him barely into the diabetes and not pre diabetes category. D/w him. Diet needs to improved as well as weight.  If same at next visit, will need additional medications.  === 08/30/22 ===     CHEST 2 VIEW     - Impression -  Small right pleural effusion with associated atelectasis and/or  pneumonitis.     Ct chest done sept 2022 and was ok from pulm standpoint, some scarring, had covid prior to those films.  He feels no sequelae.       Latest Reference Range & Units 03/09/23 07:34   GLUCOSE 74 - 99 mg/dL 112 (H)   SODIUM 136 - 145 mmol/L 140   POTASSIUM 3.5 - 5.3 mmol/L 4.3   CHLORIDE 98 - 107 mmol/L 102   Bicarbonate 21 - 32 mmol/L 31   Anion Gap 10 - 20 mmol/L 11   Blood Urea Nitrogen 6 - 23 mg/dL 12   Creatinine 0.50 - 1.30 mg/dL 0.73  "  Calcium 8.6 - 10.6 mg/dL 9.3   Albumin 3.4 - 5.0 g/dL 4.4   Alkaline Phosphatase 33 - 136 U/L 53   ALT 10 - 52 U/L 15   AST 9 - 39 U/L 13   Bilirubin Total 0.0 - 1.2 mg/dL 0.8   HDL CHOLESTEROL mg/dL 36.0 !   Cholesterol/HDL Ratio   3.7   VLDL 0 - 40 mg/dL 35   TRIGLYCERIDES 0 - 149 mg/dL 174 (H)   Total Protein 6.4 - 8.2 g/dL 6.8   CHOLESTEROL 0 - 199 mg/dL 132   LDL 0 - 99 mg/dL 61   GFR MALE >90 mL/min/1.73m2 >90   Vitamin B12 211 - 911 pg/mL 870   Hemoglobin A1C % 6.0 !   Thyroid Stimulating Hormone 0.44 - 3.98 mIU/L 1.45   Estimated Average Glucose MG/   WBC 4.4 - 11.3 x10E9/L 8.9   RBC 4.50 - 5.90 x10E12/L 4.62   HEMOGLOBIN 13.5 - 17.5 g/dL 14.3   HEMATOCRIT 41.0 - 52.0 % 43.6   MCV 80 - 100 fL 94   MCHC 32.0 - 36.0 g/dL 32.8   Platelets 150 - 450 x10E9/L 220   nRBC 0.0 - 0.0 /100 WBC 0.0   RED CELL DISTRIBUTION WIDTH 11.5 - 14.5 % 12.6            Lab Results   Component Value Date     PSA 0.21 08/30/2022     PSA 0.21 09/23/2021     PSA 0.17 08/05/2021      Vitals:  /65   Pulse 66   Resp 22   Ht 1.753 m (5' 9\")   Wt 88.6 kg (195 lb 6.4 oz)   BMI 28.86 kg/m²         8/2/2022     7:14 PM 8/3/2022    11:55 AM 8/16/2022     1:32 PM 10/25/2022     9:21 AM 11/10/2022     9:41 AM 3/14/2023     2:31 PM 7/10/2023     1:43 PM   Vitals   Systolic 123 123 106 113 104 100 106   Diastolic 66 80 68 73 68 66 65   Heart Rate 71 59 68 56 60 60 66   Temp 36.7 °C (98.1 °F) 37.1 °C (98.7 °F)        Resp 14  16  16 18 22   Height (in)   1.778 m (5' 10\") 1.778 m (5' 10\")  1.778 m (5' 10\") 1.753 m (5' 9\")   Weight (lb) 194  194 192 191 192 195.4   BMI 27.84 kg/m2  27.84 kg/m2 27.55 kg/m2 27.41 kg/m2 27.55 kg/m2 28.86 kg/m2   BSA (m2) 2.08 m2  2.08 m2 2.07 m2 2.07 m2 2.07 m2 2.08 m2   Visit Report      Report Report         Physical Exam  Physical Exam  Musculoskeletal:      Right shoulder: Normal. No swelling, deformity, effusion, laceration, tenderness, bony tenderness or crepitus. Normal range of motion. Normal " strength. Normal pulse.      Comments: Has from right shoulder. Has some discomfort near ac joint reaching around to the back.   Skin:             Comments: Red/pink is dry more macular rash, none on back.scattered bilateral trunk, larger right mid abdomen was biopsied by adriana bui at [facility] and they told him psoriasis'     Blue areas are dry scaley thicker  Sometimes has this on palms on right hand but not now. Never dorsum.     Excellent hair growth feet and toes.     Normal sensation light touch bilateral feet and toes.     ---------  General: Patient is known to me, looks well, is in usual state of health, with  no obvious distress.  Head: normocephalic  Eyes: PERRL, EOMI, no scleral icterus or conjunctival abnormality  Ears:Normal canals and TM's  Oropharynx: Well hydrated mucosa. no lesions, erythema. palate moves well.  Neck: No masses, no cervical (A/P/ or Lateral) adenopathy. Thyroid not enlarged and no nodules. Carotid pulses normal and no bruits.  Chest: Normal AP diameter. No supraclavicular adenopathy.  Lungs: Clear to auscultation: no rales , wheezes, rhonchi, rubs, examined bilaterally, ant/post /lateral. RR normal.  Heart: RRR. No MGCR S1 S2 normal.  Abd: BS normal, no bruits. No hepatosplenomegaly. No abdominal mass or tenderness. No distension.  Extremities: No upper extremity edema. No lower leg edema.No ischemia.   Joints: no synovitis seen. No deformities.  Pulses: normal posterior tibialis pulses, normal dorsalis pedis pulses. normal radial pulses. Normal femoral pulses without bruits.  Neuro: CN 2-12 intact . Alert, oriented, appropriate.  No focal weakness observed. No tremors. Ambulation is normal .  Psych: Mood and affect normal. No cognitive deficits noted during this exam. Alert, oriented, appropriate.  Skin: No rash, petechiae or excessive bruising.  Lymph: no SC axillary or inguinal adenopathy   feet     Assessment/Plan   Problem List Items Addressed This Visit       Anxiety disorder  stable    Benign prostatic hyperplasia (BPH) with urinary urgency    Calcification of coronary artery    Relevant Medications asymptomatic no angina    metoprolol tartrate (Lopressor) 25 mg tablet    Depression, major, single episode, mild (CMS/HCC)    Elevated triglycerides with high cholesterol    Relevant Medications    omega-3 acid ethyl esters (Lovaza) 1 gram capsule    atorvastatin (Lipitor) 40 mg tablet    Insomnia    Relevant Medications    traZODone (Desyrel) 50 mg tablet    Pre-diabetes    Relevant Orders    POCT glycosylated hemoglobin (Hb A1C) manually resulted (Completed)   Watch diet weight loss encouraged low sugar diet encouraged and Mediterranean diet encouraged for all the above  Other Visit Diagnoses       Encounter for subsequent annual wellness visit (AWV) in Medicare patient    -  Primary    Full code status        Encounter for annual physical exam        Encounter for screening for other disorder        Hypertension, unspecified type        Relevant Medications    metoprolol tartrate (Lopressor) 25 mg tablet    New/recurrent chronic right shoulder pain        xray shoulder/c spine/pain right shoulder &sometimes radicular right arm to elbow, re tx meloxicam for 30 days. refer ortho if not better.bursitis gone on exam.    Relevant Orders    XR shoulder right 2+ views (Completed)    XR cervical spine complete 4-5 views (Completed)    New: Recurrent: Cramp of toe        Relevant Orders    Vascular US ankle brachial index (PRIYANKA) with exercise, I suspect this might be radiculopathy in origin and not vascular but we will check.    New: Foot cramps        Relevant Orders    Vascular US ankle brachial index (PRIYANKA) with exercise    Rash    see below about following up with dermatology he already has an appointment, and if he wants a second opinion let me know and we can refer him elsewhere.  He currently sees [facility].     Annual  history and physical completed including  ROS, PE.   Medicare  wellness visit  completed including:  Immunizations,   Health Maintenance items,  Discussion of advanced directives and code status, which is: Full code  Fall risk and prevention addressed.  Functionality and pain were assessed.   Cancer screening testing was addressed as appropriate-see patient discussion/orders.   Medications were discussed and reviewed/reconciled and refill need assessed/handled.   Patient states taking their medication regularly and appropriately.  Also:   Depression screening PhQ-2 completed: Negative PHQ 2  Alcohol misuse screen: Negative    In addition to the wellness visit and screening, the above medical issues were addressed:  As well as results of testing completed since last visit.     Patient instructions  Thank you for coming in for your annual.  Keep appointment with dermatology, let me know if you would prefer a second opinion with dermatology.  Ask about an ointment for the finger issue, different than the cream for the spots on your trunk.    Shoulder and cervical spine xray.    Schedule PRIYANKA testing with exercise in vascular lab.  If this is negative, then lumbar spine xray and nerve conduction test of the legs. Call .    Follow up 3 months.  Flu shot October.

## 2023-07-12 NOTE — RESULT ENCOUNTER NOTE
Please call the patient regarding his result.  Mild arthritis in the neck.  He does have rotator cuff tendonitis. If shoulder not improving can refer to ortho.

## 2023-07-14 ENCOUNTER — TELEPHONE (OUTPATIENT)
Dept: PRIMARY CARE | Facility: CLINIC | Age: 73
End: 2023-07-14
Payer: MEDICARE

## 2023-07-14 NOTE — TELEPHONE ENCOUNTER
----- Message from Katiuska Helm MD sent at 7/12/2023  6:17 PM EDT -----  Please call the patient regarding his result.  Mild arthritis in the neck.  He does have rotator cuff tendonitis. If shoulder not improving can refer to ortho.

## 2023-08-30 DIAGNOSIS — M25.511 CHRONIC RIGHT SHOULDER PAIN: Primary | ICD-10-CM

## 2023-08-30 DIAGNOSIS — M75.51 BURSITIS OF RIGHT SHOULDER: ICD-10-CM

## 2023-08-30 DIAGNOSIS — G89.29 CHRONIC RIGHT SHOULDER PAIN: Primary | ICD-10-CM

## 2023-08-30 RX ORDER — MELOXICAM 7.5 MG/1
TABLET ORAL
Qty: 30 TABLET | Refills: 1 | Status: SHIPPED | OUTPATIENT
Start: 2023-08-30 | End: 2023-10-11 | Stop reason: ALTCHOICE

## 2023-08-31 DIAGNOSIS — M75.51 BURSITIS OF RIGHT SHOULDER: ICD-10-CM

## 2023-08-31 DIAGNOSIS — M79.601 ARM PAIN, ANTERIOR, RIGHT: ICD-10-CM

## 2023-08-31 RX ORDER — MELOXICAM 7.5 MG/1
TABLET ORAL
Qty: 30 TABLET | Refills: 1 | OUTPATIENT
Start: 2023-08-31

## 2023-09-06 PROBLEM — Z87.19 HISTORY OF ESOPHAGEAL STRICTURE: Status: ACTIVE | Noted: 2023-09-06

## 2023-09-06 PROBLEM — I73.9 CLAUDICATION, INTERMITTENT (CMS-HCC): Status: ACTIVE | Noted: 2023-09-06

## 2023-09-06 RX ORDER — TRIAMCINOLONE ACETONIDE 1 MG/G
CREAM TOPICAL
COMMUNITY
Start: 2023-08-25 | End: 2024-02-26 | Stop reason: ALTCHOICE

## 2023-09-06 RX ORDER — DEXLANSOPRAZOLE 30 MG/1
CAPSULE, DELAYED RELEASE ORAL
COMMUNITY
End: 2023-10-18

## 2023-09-06 RX ORDER — FLUOXETINE HYDROCHLORIDE 60 MG/1
60 TABLET, FILM COATED ORAL; ORAL
COMMUNITY
End: 2023-10-11 | Stop reason: ALTCHOICE

## 2023-09-21 LAB — PROSTATE SPECIFIC AG (NG/ML) IN SER/PLAS: 0.23 NG/ML (ref 0–4)

## 2023-10-02 ENCOUNTER — TREATMENT (OUTPATIENT)
Dept: PHYSICAL THERAPY | Facility: CLINIC | Age: 73
End: 2023-10-02
Payer: MEDICARE

## 2023-10-02 DIAGNOSIS — M25.511 RIGHT SHOULDER PAIN: Primary | ICD-10-CM

## 2023-10-02 DIAGNOSIS — M25.511 RIGHT SHOULDER PAIN, UNSPECIFIED CHRONICITY: ICD-10-CM

## 2023-10-02 PROCEDURE — 97140 MANUAL THERAPY 1/> REGIONS: CPT | Mod: GP | Performed by: PHYSICAL THERAPIST

## 2023-10-02 PROCEDURE — 97110 THERAPEUTIC EXERCISES: CPT | Mod: GP | Performed by: PHYSICAL THERAPIST

## 2023-10-02 ASSESSMENT — PAIN SCALES - GENERAL: PAINLEVEL_OUTOF10: 6

## 2023-10-02 ASSESSMENT — PAIN DESCRIPTION - DESCRIPTORS: DESCRIPTORS: ACHING

## 2023-10-02 ASSESSMENT — PAIN - FUNCTIONAL ASSESSMENT: PAIN_FUNCTIONAL_ASSESSMENT: 0-10

## 2023-10-02 NOTE — PROGRESS NOTES
Physical Therapy    Physical Therapy Treatment    Patient Name: Elpidio Jamil  MRN: [id number]  Today's Date: 10/2/2023  Time Calculation  Start Time: 1454  Stop Time: 1537  Time Calculation (min): 43 min      Assessment: Patient tolerates session well. Requires min verbal and tactile cues throughout session for proper form and execution. Midback strength deficits remain evident impacting ability to maintain proper posture. Patient with hypertonic R UT which appears to respond well to MT this date.        Plan:   Continue to address strength, tissue mobility, and jt mobility deficits as tolerated to address ongoing pain impacting ability to function at prior level unrestricted and symptom free.     Current Problem  1. Right shoulder pain        2. Right shoulder pain, unspecified chronicity            Subjective   General   Patient states that he is doing well today. Denies concordant shoulder pain but states (R) lateral neck pain continues to be present and impacting functional tasks.      Pain  Pain Assessment: 0-10  Pain Score: 6  Pain Location: Shoulder  Pain Orientation: Right (superior)  Pain Radiating Towards: neck  Pain Descriptors: Aching      Treatments:  Therapeutic Exercise  Therapeutic Exercise Performed: Yes  Therapeutic Exercise Activity 1: Stepper UE only 5 min  Therapeutic Exercise Activity 2: 3 way ball roll out 3 min (new)  Therapeutic Exercise Activity 3: Wedge ER 5/5, 2 min  Therapeutic Exercise Activity 4: UTS 2x30 seconds (B)  Therapeutic Exercise Activity 5: SAPD Green, 3x10  Therapeutic Exercise Activity 6: Rows Black, 3x10  Therapeutic Exercise Activity 7: ER/IR Green, 2x10 (R)  Therapeutic Exercise Activity 8: Seated Thoracic Ext 5/5, 2 min  Therapeutic Exercise Activity 9: Ball Stabs 0.5 kg, 30x CW and CCW (new)    Manual Therapy  Manual Therapy Performed: Yes  Manual Therapy Activity 1: IASTM to (R) UT 8 min    Modalities  Modalities Used: Yes  Modality 1: Timed Ultrasound (Continous at  1 mhz and 1.5 w/cm2 for 8 min to (R) lateral shoulder)       Goals:  Encounter Problems       Encounter Problems (Active)       PT Problem       QuickDASH score of 0        Start:  10/02/23    Expected End:  12/01/23            Patient will reports no more than 0/10 pain in VAS scale with all ADLS, HHCs, and Self Care tasks.         Start:  10/02/23    Expected End:  12/01/23            Pt will demo improved AROM of R shoulder(s) to >/= 165 FLEX, 165 ABD, 90 ER, & functional IR to T8 for improved ease with functional activities with reaching, lifting, carrying for progression toward independence with ADL's & IADL's.         Start:  10/02/23    Expected End:  12/01/23            Pt will demo improved MMT by >/= 1 point on 0-5 point scale in BUE for improved strength and stability, and improved ease with lifting, carrying, and proper mechanics with ADL's & IADL's.         Start:  10/02/23    Expected End:  12/01/23            Patient will be able to lift 10 lbs from floor to waist without pain and good mechanics to allow for return to PLOF and ability to complete ADLs and HHCs without restriction.         Start:  10/02/23    Expected End:  12/01/23

## 2023-10-04 ENCOUNTER — OFFICE VISIT (OUTPATIENT)
Dept: DERMATOLOGY | Facility: CLINIC | Age: 73
End: 2023-10-04
Payer: MEDICARE

## 2023-10-04 DIAGNOSIS — L29.9 PRURITUS: ICD-10-CM

## 2023-10-04 DIAGNOSIS — B35.3 TINEA PEDIS OF LEFT FOOT: Primary | ICD-10-CM

## 2023-10-04 DIAGNOSIS — R21 RASH AND OTHER NONSPECIFIC SKIN ERUPTION: ICD-10-CM

## 2023-10-04 DIAGNOSIS — C86.6 PRIMARY CUTANEOUS CD30-POSITIVE T-CELL PROLIFERATIONS (MULTI): ICD-10-CM

## 2023-10-04 DIAGNOSIS — R21 RASH AND NONSPECIFIC SKIN ERUPTION: ICD-10-CM

## 2023-10-04 LAB — POC KOH - DERM RESULT 1: NEGATIVE

## 2023-10-04 PROCEDURE — 1125F AMNT PAIN NOTED PAIN PRSNT: CPT | Performed by: DERMATOLOGY

## 2023-10-04 PROCEDURE — 88305 TISSUE EXAM BY PATHOLOGIST: CPT

## 2023-10-04 PROCEDURE — 88313 SPECIAL STAINS GROUP 2: CPT | Performed by: DERMATOLOGY

## 2023-10-04 PROCEDURE — 1159F MED LIST DOCD IN RCRD: CPT | Performed by: DERMATOLOGY

## 2023-10-04 PROCEDURE — 88305 TISSUE EXAM BY PATHOLOGIST: CPT | Performed by: DERMATOLOGY

## 2023-10-04 PROCEDURE — 99204 OFFICE O/P NEW MOD 45 MIN: CPT | Performed by: DERMATOLOGY

## 2023-10-04 PROCEDURE — 11104 PUNCH BX SKIN SINGLE LESION: CPT | Performed by: DERMATOLOGY

## 2023-10-04 PROCEDURE — 1160F RVW MEDS BY RX/DR IN RCRD: CPT | Performed by: DERMATOLOGY

## 2023-10-04 PROCEDURE — 11105 PUNCH BX SKIN EA SEP/ADDL: CPT | Performed by: DERMATOLOGY

## 2023-10-04 PROCEDURE — 3008F BODY MASS INDEX DOCD: CPT | Performed by: DERMATOLOGY

## 2023-10-04 PROCEDURE — 87220 TISSUE EXAM FOR FUNGI: CPT | Performed by: DERMATOLOGY

## 2023-10-04 PROCEDURE — 1036F TOBACCO NON-USER: CPT | Performed by: DERMATOLOGY

## 2023-10-04 NOTE — PROGRESS NOTES
Subjective     Elpidio Jamil is a 73 y.o. male with past medical history of psoriasis presents for the following: Rash.      Patient developed a rash 2-3 years ago. First rash started on his abdomen and spread to his genital area, right palmar surface, and most recently to his left medial foot. Rash is intermittently pruritic. His dermatologist at [facility] took a punch biopsy of his abdomen on 8/25/23. Biopsy showed atypical lymphocytic infiltrate - atypical features include the distribution of lymphocytes, papillary dermal fibrosis, and slight increased CD4:CD8 ratio. Positive staining for CD3, CD4, and CD8. Negative PAS. However, infiltrate lacks more diagnostic features of mycosis fungoides. Morphological features atypical and cannot rule out an atypical and evolving low-grad lymphoproliferative disorder.     Patient has been using topical steroids including clobetasol and triamcinolone for these lesions with some improvement, however lesions never completely cleared. He has not used any topical steroids in 4 weeks. He also applies OTC cerave moisturizer to these lesions every night. Denies fevers, chills, night sweats, fatigue, weight loss, and joint pain.     Review of Systems:  No other skin or systemic complaints other than what is documented elsewhere in the note.    The following portions of the chart were reviewed this encounter and updated as appropriate:       Skin Cancer History  No skin cancer on file.    Specialty Problems          Dermatology Problems    Acne    Eczema of hand    Psoriasis     Past Medical History:  Elpidio Jamil  has a past medical history of Abdominal distension (gaseous) (03/23/2022), Abnormal findings on diagnostic imaging of heart and coronary circulation, Acute upper respiratory infection, unspecified (08/03/2022), Benign neoplasm of colon, unspecified (12/11/2012), Benign paroxysmal vertigo, unspecified ear (03/01/2017), Benign prostatic hyperplasia  without lower urinary tract symptoms (08/05/2021), Body mass index (BMI) 27.0-27.9, adult (08/22/2019), Body mass index (BMI) 28.0-28.9, adult (10/28/2021), COVID-19 (11/10/2022), COVID-19 virus infection (03/14/2023), Encounter for follow-up examination after completed treatment for conditions other than malignant neoplasm (08/29/2018), Encounter for immunization (08/22/2019), Encounter for immunization (06/28/2017), Encounter for screening for malignant neoplasm of prostate (11/22/2014), Encounter for screening for malignant neoplasm of prostate (07/08/2018), Encounter for screening for other viral diseases (08/05/2021), Impacted cerumen, bilateral (09/10/2020), Low back pain, unspecified (01/11/2022), Muscle spasm of back (01/11/2022), Other conditions influencing health status, Other enthesopathies, not elsewhere classified (03/09/2015), Other enthesopathy of right foot and ankle (12/07/2021), Other fecal abnormalities (03/09/2015), Other muscle spasm (09/04/2018), Other prurigo (04/06/2021), Other skin changes, Other skin changes due to chronic exposure to nonionizing radiation (03/29/2016), Other specified symptoms and signs involving the circulatory and respiratory systems (11/10/2022), Pain in left knee (09/03/2021), Pain in right knee (12/01/2020), Palpitations, Palpitations (01/31/2023), Personal history of colonic polyps, Personal history of diseases of the blood and blood-forming organs and certain disorders involving the immune mechanism (11/20/2013), Personal history of diseases of the skin and subcutaneous tissue, Personal history of diseases of the skin and subcutaneous tissue (10/28/2021), Personal history of other diseases of the circulatory system, Personal history of other diseases of the digestive system, Personal history of other diseases of the digestive system (02/14/2019), Personal history of other diseases of the respiratory system (06/07/2019), Personal history of other diseases of the  respiratory system (11/10/2022), Personal history of other drug therapy (05/21/2018), Personal history of other drug therapy (10/14/2016), Personal history of other endocrine, nutritional and metabolic disease, Personal history of other endocrine, nutritional and metabolic disease (08/22/2019), Personal history of other medical treatment, Personal history of other specified conditions (09/04/2018), Personal history of other specified conditions (06/08/2021), Personal history of other specified conditions (05/14/2019), Personal history of other specified conditions (02/12/2019), Personal history of other specified conditions (10/08/2015), Personal history of other specified conditions (11/10/2022), Personal history of pneumonia (recurrent) (11/10/2022), Personal history of pneumonia (recurrent), Pleural effusion (03/14/2023), Pneumonia (03/14/2023), Pruritus ani (08/23/2016), Pyuria (08/22/2019), Rales (03/14/2023), Superficial mycosis, unspecified (10/08/2015), Unspecified abdominal hernia without obstruction or gangrene, Ventricular premature depolarization (10/19/2021), Wheezing (03/14/2023), and Zoster without complications (08/10/2018).    Past Surgical History:  Elpidio Jamil  has a past surgical history that includes Esophagogastroduodenoscopy (01/08/2015); Other surgical history (02/12/2019); Other surgical history (01/08/2015); Other surgical history (12/07/2021); Other surgical history (12/09/2021); Hernia repair (06/29/2015); Hernia repair (04/26/2012); Other surgical history (04/26/2012); Colonoscopy (04/26/2012); and Other surgical history (04/26/2012).    Family History:  Patient family history includes Breast cancer in his daughter; COPD in his mother; Cancer in his maternal grandfather; Colon cancer in his maternal grandmother; Coronary artery disease in his father; Dementia in his mother; Hyperlipidemia in his mother; cardiac disorder in his father.    Social History:  Elpidio Jamil  reports that  he has never smoked. He has been exposed to tobacco smoke. He has never used smokeless tobacco. He reports that he does not drink alcohol and does not use drugs.    Allergies:  Nirmatrelvir-ritonavir    Current Medications / CAM's:    Current Outpatient Medications:     aspirin 81 mg EC tablet, Take 1 tablet (81 mg) by mouth once daily. with food usually in the am, Disp: , Rfl:     atorvastatin (Lipitor) 40 mg tablet, Take 1 tablet (40 mg) by mouth once daily at bedtime., Disp: 90 tablet, Rfl: 3    CALCIUM CITRATE-VITAMIN D3 ORAL, Take 1 tablet by mouth 1 (one) time each day., Disp: , Rfl:     cetirizine (ZYRTEC) 10 mg capsule, Take by mouth., Disp: , Rfl:     clobetasol (Temovate) 0.05 % cream, twice a day. APPLY SPARINGLY TO AFFECTED AREA(S), Disp: , Rfl:     cyanocobalamin, vitamin B-12, 1,000 mcg capsule, Take 1 capsule by mouth 5 (five) times a week., Disp: , Rfl:     dexlansoprazole (Dexilant) 30 mg DR capsule, Take by mouth once daily., Disp: , Rfl:     diclofenac sodium 1 % kit, 2 g. Apply to  right knee, twice to 3 times per day, Disp: , Rfl:     famotidine (Pepcid) 40 mg tablet, Take by mouth. 1 tab every evening per dr mooney:9/2021 gi trying to wean him from this to famotidine but so far gets hb back after 2 days of famo and has to take ppi 3rd night, Disp: , Rfl:     FLUoxetine (PROzac) 10 mg capsule, Take 1 capsule (10 mg) by mouth once daily. with 40mg to equal 50mg daily, Disp: 90 capsule, Rfl: 3    FLUoxetine (PROzac) 40 mg capsule, Take 1 capsule (40 mg) by mouth once daily. with 10mg to make 50mg, Disp: , Rfl:     FLUoxetine (PROzac) 60 mg tablet, Take 1 tablet (60 mg) by mouth once daily., Disp: , Rfl:     fluticasone (Flonase) 50 mcg/actuation nasal spray, Administer 2 sprays into each nostril twice a day., Disp: , Rfl:     lansoprazole (Prevacid) 15 mg DR capsule, Take 1 capsule (15 mg) by mouth every other day., Disp: , Rfl:     meloxicam (Mobic) 7.5 mg tablet, Take one tab once per day  regularly , can increase to twice per day if needed., Disp: 30 tablet, Rfl: 1    metoprolol tartrate (Lopressor) 25 mg tablet, Take 0.5 tablets (12.5 mg) by mouth 2 times a day., Disp: 90 tablet, Rfl: 3    NON FORMULARY, Antioxidant Sunscreen SPF 50+ ([facility] [place]), Disp: , Rfl:     omega-3 acid ethyl esters (Lovaza) 1 gram capsule, Take 4 capsules (4 g) by mouth once daily., Disp: 360 capsule, Rfl: 3    psyllium husk-calcium (Metamucil Plus Calcium) 1-60 gram-mg capsule, Take 5 capsules by mouth 1 (one) time each day., Disp: , Rfl:     tamsulosin (Flomax) 0.4 mg 24 hr capsule, Take 1 capsule (0.4 mg) by mouth once daily., Disp: , Rfl:     traZODone (Desyrel) 50 mg tablet, Take 1 tablet (50 mg) by mouth once daily at bedtime., Disp: 90 tablet, Rfl: 3    triamcinolone (Kenalog) 0.1 % cream, PLEASE SEE ATTACHED FOR DETAILED DIRECTIONS, Disp: , Rfl:      Objective   Well appearing patient in no apparent distress; mood and affect are within normal limits.    A full examination was performed including scalp, head, eyes, ears, nose, lips, neck, chest, axillae, abdomen, back, buttocks, bilateral upper extremities, bilateral lower extremities, hands, feet, fingers, toes, fingernails, and toenails. All findings within normal limits unless otherwise noted below.    Assessment/Plan     1. Rash and other nonspecific skin eruption  Left Periumbilical Abdomen; Right Abdomen (side) - Upper; Left Hallux Metatarsophalangeal Joint    Left Periumbilical Abdomen  Pink patch with scale    Right Abdomen (side) - Upper  Pink patch with scale    Left Hallux Metatarsophalangeal Joint  Pink to erythematous patch with scale                          Rash, NOS - pink to erythematous patches with scale on right chest, right abdomen, periumbilical area, and left medial foot, ddx: CTCL vs drug eruption  -Patient referred by [facility] for workup of CTCL  -Outside biopsy (8/2023) showing atypical lymphocytic infiltrate - with  atypical distribution of lymphocytes in the epidermis,  papillary dermal fibrosis and slight increase CD4:CD8 ratio - cannot rule out an evolving low-grade lymphoproliferative disorder  -Reviewed patient's medication list. Possible culprits include metoprolol vs meloxicam vs trazodone. Patient denies starting/stopping any medications prior to the rash onset.   -Discussed with patient need to proceed with biopsy of three lesions today to help establish a diagnosis.   -KOH scrapings of left medial foot and 4th interdigital spaces completed. Negative for hyphae.   -Patient signed medical record release form to obtain outside biopsy slides.   -Ordered labs including CBC w/ diff, CMP, JOSIE, and LILLIAN panel.   -Patient to be presented in lymphoma conference.   -Patient to return in 10 days for suture removal.   -RTC in 2 months for follow up of rash.    Lesion biopsy - Left Periumbilical Abdomen  Type of biopsy: punch    Informed consent: discussed and consent obtained    Timeout: patient name, date of birth, surgical site, and procedure verified    Procedure prep:  Patient was prepped and draped  Anesthesia: the lesion was anesthetized in a standard fashion    Anesthetic:  1% lidocaine w/ epinephrine 1-100,000 local infiltration  Punch size:  4 mm  Suture size:  4-0  Suture type: nylon    Suture removal (days):  10  Hemostasis achieved with: suture    Outcome: patient tolerated procedure well    Post-procedure details: sterile dressing applied and wound care instructions given    Dressing type: petrolatum and bandage      Lesion biopsy - Right Abdomen (side) - Upper  Type of biopsy: punch    Informed consent: discussed and consent obtained    Timeout: patient name, date of birth, surgical site, and procedure verified    Procedure prep:  Patient was prepped and draped  Anesthesia: the lesion was anesthetized in a standard fashion    Anesthetic:  1% lidocaine w/ epinephrine 1-100,000 local infiltration  Punch size:  4  mm  Suture size:  4-0  Suture type: nylon    Suture removal (days):  10  Hemostasis achieved with: suture    Outcome: patient tolerated procedure well    Post-procedure details: sterile dressing applied and wound care instructions given    Dressing type: petrolatum and bandage      Lesion biopsy - Left Hallux Metatarsophalangeal Joint  Type of biopsy: punch    Informed consent: discussed and consent obtained    Timeout: patient name, date of birth, surgical site, and procedure verified    Procedure prep:  Patient was prepped and draped  Anesthesia: the lesion was anesthetized in a standard fashion    Anesthetic:  1% lidocaine w/ epinephrine 1-100,000 local infiltration  Punch size:  4 mm  Suture size:  4-0  Suture type: nylon    Suture removal (days):  10  Hemostasis achieved with: suture    Outcome: patient tolerated procedure well    Post-procedure details: sterile dressing applied and wound care instructions given    Dressing type: petrolatum and bandage      Specimen 1 - Dermatopathology- DERM LAB  Differential Diagnosis: r/o CTCL  Check Margins Yes/No?:    Comments:    Dermpath Lab: Routine Histopathology (formalin-fixed tissue)    Specimen 2 - Dermatopathology- DERM LAB  Differential Diagnosis: r/o CTCL  Check Margins Yes/No?:    Comments:    Dermpath Lab: Routine Histopathology (formalin-fixed tissue)    Specimen 3 - Dermatopathology- DERM LAB  Differential Diagnosis: CTCL vs tinea pedis  Check Margins Yes/No?:    Comments:    Dermpath Lab: Routine Histopathology (formalin-fixed tissue)    Related Procedures  POCT KOH Prep - DERM Manually Resulted    Related Procedures  CBC and Auto Differential  Comprehensive metabolic panel  Samantha-With Reflex To Lillian  LILLIAN Panel  Follow Up In Dermatology - Established Patient  Follow Up In Dermatology - Nurse Visit

## 2023-10-04 NOTE — PATIENT INSTRUCTIONS
Hi Mr. Jamil,     It was great seeing you in Dr. De Leon's clinic today. You were referred to us for workup of cutaneous T cell lymphoma. We reviewed your biopsy results from [facility]. Cutaneous T cell lymphoma mycosis fungoides type is on the differential but the biopsy did not definitively show cutaneous T cell lymphoma.   We took three additional biopsies today to better characterize the cause of your rash. Please leave the biopsy areas dry for 24 hours. After 24 hours you can gently cleanse with soap and water. Reapply Vaseline and a bandaid daily. Wound care instructions sheet was provided. We will see you in 7-10 days for suture removal. We also scraped your left foot to rule out a fungal infection. The scrapings were negative for any fungus. We will call you in 1-2 weeks with the biopsy results. Additionally, we would like you to stop by an  lab for your blood work.

## 2023-10-05 ENCOUNTER — TREATMENT (OUTPATIENT)
Dept: PHYSICAL THERAPY | Facility: CLINIC | Age: 73
End: 2023-10-05
Payer: MEDICARE

## 2023-10-05 ENCOUNTER — LAB (OUTPATIENT)
Dept: LAB | Facility: LAB | Age: 73
End: 2023-10-05
Payer: MEDICARE

## 2023-10-05 DIAGNOSIS — M25.511 PAIN IN RIGHT SHOULDER: ICD-10-CM

## 2023-10-05 DIAGNOSIS — M25.511 RIGHT SHOULDER PAIN, UNSPECIFIED CHRONICITY: Primary | ICD-10-CM

## 2023-10-05 DIAGNOSIS — R21 RASH AND NONSPECIFIC SKIN ERUPTION: ICD-10-CM

## 2023-10-05 LAB
ALBUMIN SERPL BCP-MCNC: 4.4 G/DL (ref 3.4–5)
ALP SERPL-CCNC: 45 U/L (ref 33–136)
ALT SERPL W P-5'-P-CCNC: 24 U/L (ref 10–52)
ANION GAP SERPL CALC-SCNC: 12 MMOL/L (ref 10–20)
AST SERPL W P-5'-P-CCNC: 16 U/L (ref 9–39)
BASOPHILS # BLD AUTO: 0.05 X10*3/UL (ref 0–0.1)
BASOPHILS NFR BLD AUTO: 0.6 %
BILIRUB SERPL-MCNC: 0.6 MG/DL (ref 0–1.2)
BUN SERPL-MCNC: 17 MG/DL (ref 6–23)
CALCIUM SERPL-MCNC: 9.6 MG/DL (ref 8.6–10.6)
CENTROMERE B AB SER-ACNC: <0.2 AI
CHLORIDE SERPL-SCNC: 102 MMOL/L (ref 98–107)
CHROMATIN AB SERPL-ACNC: <0.2 AI
CO2 SERPL-SCNC: 31 MMOL/L (ref 21–32)
CREAT SERPL-MCNC: 0.76 MG/DL (ref 0.5–1.3)
DSDNA AB SER-ACNC: <1 IU/ML
ENA JO1 AB SER QL IA: <0.2 AI
ENA RNP AB SER IA-ACNC: <0.2 AI
ENA SCL70 AB SER QL IA: <0.2 AI
ENA SM AB SER IA-ACNC: <0.2 AI
ENA SM+RNP AB SER QL IA: <0.2 AI
ENA SS-A AB SER IA-ACNC: <0.2 AI
ENA SS-B AB SER IA-ACNC: <0.2 AI
EOSINOPHIL # BLD AUTO: 0.3 X10*3/UL (ref 0–0.4)
EOSINOPHIL NFR BLD AUTO: 3.7 %
ERYTHROCYTE [DISTWIDTH] IN BLOOD BY AUTOMATED COUNT: 12.8 % (ref 11.5–14.5)
GFR SERPL CREATININE-BSD FRML MDRD: >90 ML/MIN/1.73M*2
GLUCOSE SERPL-MCNC: 101 MG/DL (ref 74–99)
HCT VFR BLD AUTO: 43.5 % (ref 41–52)
HGB BLD-MCNC: 14.3 G/DL (ref 13.5–17.5)
IMM GRANULOCYTES # BLD AUTO: 0.03 X10*3/UL (ref 0–0.5)
IMM GRANULOCYTES NFR BLD AUTO: 0.4 % (ref 0–0.9)
LYMPHOCYTES # BLD AUTO: 2.17 X10*3/UL (ref 0.8–3)
LYMPHOCYTES NFR BLD AUTO: 26.7 %
MCH RBC QN AUTO: 30.9 PG (ref 26–34)
MCHC RBC AUTO-ENTMCNC: 32.9 G/DL (ref 32–36)
MCV RBC AUTO: 94 FL (ref 80–100)
MONOCYTES # BLD AUTO: 0.84 X10*3/UL (ref 0.05–0.8)
MONOCYTES NFR BLD AUTO: 10.3 %
NEUTROPHILS # BLD AUTO: 4.75 X10*3/UL (ref 1.6–5.5)
NEUTROPHILS NFR BLD AUTO: 58.3 %
NRBC BLD-RTO: 0 /100 WBCS (ref 0–0)
PLATELET # BLD AUTO: 222 X10*3/UL (ref 150–450)
PMV BLD AUTO: 9.1 FL (ref 7.5–11.5)
POTASSIUM SERPL-SCNC: 4.3 MMOL/L (ref 3.5–5.3)
PROT SERPL-MCNC: 6.9 G/DL (ref 6.4–8.2)
RBC # BLD AUTO: 4.63 X10*6/UL (ref 4.5–5.9)
RIBOSOMAL P AB SER-ACNC: <0.2 AI
SODIUM SERPL-SCNC: 141 MMOL/L (ref 136–145)
WBC # BLD AUTO: 8.1 X10*3/UL (ref 4.4–11.3)

## 2023-10-05 PROCEDURE — 97140 MANUAL THERAPY 1/> REGIONS: CPT | Mod: GP | Performed by: PHYSICAL THERAPIST

## 2023-10-05 PROCEDURE — 97110 THERAPEUTIC EXERCISES: CPT | Mod: GP | Performed by: PHYSICAL THERAPIST

## 2023-10-05 PROCEDURE — 86038 ANTINUCLEAR ANTIBODIES: CPT

## 2023-10-05 PROCEDURE — 86235 NUCLEAR ANTIGEN ANTIBODY: CPT

## 2023-10-05 PROCEDURE — 97035 APP MDLTY 1+ULTRASOUND EA 15: CPT | Mod: GP | Performed by: PHYSICAL THERAPIST

## 2023-10-05 PROCEDURE — 80053 COMPREHEN METABOLIC PANEL: CPT

## 2023-10-05 PROCEDURE — 36415 COLL VENOUS BLD VENIPUNCTURE: CPT

## 2023-10-05 PROCEDURE — 86225 DNA ANTIBODY NATIVE: CPT

## 2023-10-05 PROCEDURE — 85025 COMPLETE CBC W/AUTO DIFF WBC: CPT

## 2023-10-05 ASSESSMENT — PAIN - FUNCTIONAL ASSESSMENT: PAIN_FUNCTIONAL_ASSESSMENT: 0-10

## 2023-10-05 ASSESSMENT — PAIN SCALES - GENERAL: PAINLEVEL_OUTOF10: 3

## 2023-10-05 NOTE — PROGRESS NOTES
Physical Therapy    Physical Therapy Treatment    Patient Name: Elpidio Jamil  MRN: [id number]  Today's Date: 10/5/2023  Time Calculation  Start Time: 1435  Stop Time: 1515  Time Calculation (min): 40 min      Current Problem  1. Right shoulder pain, unspecified chronicity        2. Pain in right shoulder  PT eval and treat          Subjective :     Precautions  Precautions  STEADI Fall Risk Score (The score of 4 or more indicates an increased risk of falling): 0  Precautions Comment: none    Pain  Pain Assessment: 0-10  Pain Score: 3  Pain Location: Shoulder  Pain Orientation: Right, Anterior    Patient reports that he feels the pain less often in his (R) shoulder.     Treatments:  Therapeutic Exercise  Therapeutic Exercise Performed: Yes  1: Stepper UE only 5 min  2: 3 way ball roll out 3 min   3: Pulley's 2 min Flex and ABD NEW   4: Wedge ER 5/5, 2 min  5: UTS 2x30 seconds (B)  6: SAPD Green, 2x10 (regressed reps d/t time constraint)  7: Rows Black, 2x10 (regressed reps d/t time constraint)   8: ER/IR Green, 2x10 (R)  9: Seated Thoracic Ext 5/5, 2 min  10: Ball Stabs 0.5 kg, 30x CW and CCW     Manual Therapy  Manual Therapy Performed: Yes  Manual Therapy Activity 1: IASTM to (R) anterior, lateral, and posterior shoulder NEW    Modalities  Modalities Used: Yes  Modality 1: Timed Ultrasound - Continuous US at 1 mhz and 1.5 w/cm2 for 8 min to (R) lateral shoulder    Objective   No objective measures assessed this visit     Assessment:   Progressed interventions this date to improve shoulder mobility to reduce pain and dysfunction with daily tasks. Patient tolerates well without reports of pain. Patient reports reduced pain in (R) shld at end of session to 0/10.     Pt is progressing as expected towards goals  This session exercises were progressed (p) or added (NEW) as documented in treatment section.  Pt required minimal cues and demonstration to complete exercises with proper form and responded without adverse  effects.  Pt continues to lack strength, tissue mobility, and motion  necessary for the completion of baseline ADLs   Pt will benefit from further therapy to continue to progress towards meeting functional and impairment goals.    Plan:     Continue to progress treatment to address strength, motion, jt mobility, soft tissue mobility, and flexibility deficits impacting patient's return to PLOF unrestricted and symptom free.     Goals:

## 2023-10-06 LAB — ANA SER QL HEP2 SUBST: NEGATIVE

## 2023-10-09 ENCOUNTER — TELEPHONE (OUTPATIENT)
Dept: PRIMARY CARE | Facility: CLINIC | Age: 73
End: 2023-10-09
Payer: MEDICARE

## 2023-10-09 LAB
LABORATORY COMMENT REPORT: NORMAL
PATH REPORT.FINAL DX SPEC: NORMAL
PATH REPORT.GROSS SPEC: NORMAL
PATH REPORT.RELEVANT HX SPEC: NORMAL
PATH REPORT.TOTAL CANCER: NORMAL

## 2023-10-09 NOTE — TELEPHONE ENCOUNTER
Pt called because he had 3 skin biopsies last week. He needs his stiches out by Wed. I put him in at 8 on 10/11 (per Joanna) but he [place] a procedure [place] that morning also so he was wondering if he can come in today or tomorrow to get the stitches removed.  Elpidio [phone]

## 2023-10-10 ENCOUNTER — LAB REQUISITION (OUTPATIENT)
Dept: LAB | Facility: HOSPITAL | Age: 73
End: 2023-10-10
Payer: MEDICARE

## 2023-10-10 ENCOUNTER — TREATMENT (OUTPATIENT)
Dept: PHYSICAL THERAPY | Facility: CLINIC | Age: 73
End: 2023-10-10
Payer: MEDICARE

## 2023-10-10 DIAGNOSIS — L30.9 DERMATITIS, UNSPECIFIED: ICD-10-CM

## 2023-10-10 DIAGNOSIS — M25.511 PAIN IN RIGHT SHOULDER: ICD-10-CM

## 2023-10-10 DIAGNOSIS — M25.511 RIGHT SHOULDER PAIN, UNSPECIFIED CHRONICITY: Primary | ICD-10-CM

## 2023-10-10 PROCEDURE — 88321 CONSLTJ&REPRT SLD PREP ELSWR: CPT | Performed by: DERMATOLOGY

## 2023-10-10 PROCEDURE — 88321 CONSLTJ&REPRT SLD PREP ELSWR: CPT

## 2023-10-10 PROCEDURE — 97035 APP MDLTY 1+ULTRASOUND EA 15: CPT | Mod: GP | Performed by: PHYSICAL THERAPIST

## 2023-10-10 PROCEDURE — 97110 THERAPEUTIC EXERCISES: CPT | Mod: GP | Performed by: PHYSICAL THERAPIST

## 2023-10-10 ASSESSMENT — PAIN - FUNCTIONAL ASSESSMENT: PAIN_FUNCTIONAL_ASSESSMENT: 0-10

## 2023-10-10 ASSESSMENT — PAIN SCALES - GENERAL: PAINLEVEL_OUTOF10: 2

## 2023-10-10 NOTE — PROGRESS NOTES
"Physical Therapy    Physical Therapy Treatment    Patient Name: Elpidio Jamil  MRN: [id number]  Today's Date: 10/10/2023  Time Calculation  Start Time: 0901  Stop Time: 0946  Time Calculation (min): 45 min      Current Problem  1. Right shoulder pain, unspecified chronicity        2. Pain in right shoulder  PT eval and treat          Subjective :     Precautions  Precautions  STEADI Fall Risk Score (The score of 4 or more indicates an increased risk of falling): 0  Precautions Comment: none    Pain  Pain Assessment: 0-10  Pain Score: 2  Pain Location: Shoulder  Pain Orientation: Right, Anterior    Patient reports that shoulder feels \"not too bad\" this morning. Denies sharp pain at start of session but states that he \"knows its there\" at 2/10 on VAS.     Treatments:  Therapeutic Exercise  Therapeutic Exercise Performed: Yes  Stepper UE only 5 min  Pulley's 2 min Flex and ABD  SAPD Green, 2x10   Rows Black, 2x10   ER/IR Green, 3x10 (R) (p, reps)   3 way ball roll out 3 min   Seated Thoracic Ext 5/5, 2 min  PARVIZ Red, 3x10 NEW   HABD Red, 2x10 NEW   Ball Stabs 0.5 kg, 30x CW and CCW   Wedge ER 5/5, 2 min  UTS 2x30 seconds (B)    Modalities  Modalities Used: Yes  Timed Ultrasound - Continuous US at 1 mhz and 1.5 w/cm2 for 8 min to (R) lateral shoulder    Manual Therapy  Manual Therapy Performed: NO  IASTM to (R) anterior, lateral, and posterior shoulder HELD     Objective   No objective measures assessed this visit       Assessment:   Able to progress interventions to improve strength and stability of (R) shoulder girdle to reduce pain and dysfunction with functional use of R arm with daily tasks. Patient challenged by interventions but no reports of pain this date.     Pt is progressing as expected towards goals  This session exercises were progressed (p) or added (NEW) as documented in treatment section.  Pt required minimal to moderate cues and demonstration to complete exercises with proper form and responded without " adverse effects.  Pt continues to lack strength and tissue mobility  necessary for the completion of baseline ADLs   Pt will benefit from further therapy to continue to progress towards meeting functional and impairment goals.    Plan:     Continue to progress treatment to address strength, motion, jt mobility, soft tissue mobility, and flexibility deficits impacting patient's return to PLOF unrestricted and symptom free.     Goals:

## 2023-10-11 ENCOUNTER — TELEMEDICINE (OUTPATIENT)
Dept: NEUROLOGY | Facility: HOSPITAL | Age: 73
End: 2023-10-11
Payer: MEDICARE

## 2023-10-11 ENCOUNTER — OFFICE VISIT (OUTPATIENT)
Dept: PRIMARY CARE | Facility: CLINIC | Age: 73
End: 2023-10-11
Payer: MEDICARE

## 2023-10-11 ENCOUNTER — APPOINTMENT (OUTPATIENT)
Dept: DERMATOLOGY | Facility: CLINIC | Age: 73
End: 2023-10-11
Payer: MEDICARE

## 2023-10-11 VITALS
HEART RATE: 65 BPM | HEIGHT: 70 IN | WEIGHT: 193 LBS | BODY MASS INDEX: 27.63 KG/M2 | RESPIRATION RATE: 18 BRPM | SYSTOLIC BLOOD PRESSURE: 120 MMHG | OXYGEN SATURATION: 95 % | DIASTOLIC BLOOD PRESSURE: 78 MMHG

## 2023-10-11 DIAGNOSIS — G47.33 OSA ON CPAP: Primary | ICD-10-CM

## 2023-10-11 DIAGNOSIS — L98.9 ABNORMAL SKIN MORPHOLOGY DETERMINED BY BIOPSY: ICD-10-CM

## 2023-10-11 DIAGNOSIS — Z48.02 VISIT FOR SUTURE REMOVAL: Primary | ICD-10-CM

## 2023-10-11 DIAGNOSIS — G47.34 SLEEP-RELATED HYPOXIA: ICD-10-CM

## 2023-10-11 PROCEDURE — 1126F AMNT PAIN NOTED NONE PRSNT: CPT | Performed by: INTERNAL MEDICINE

## 2023-10-11 PROCEDURE — 3008F BODY MASS INDEX DOCD: CPT | Performed by: INTERNAL MEDICINE

## 2023-10-11 PROCEDURE — 1159F MED LIST DOCD IN RCRD: CPT | Performed by: INTERNAL MEDICINE

## 2023-10-11 PROCEDURE — 1160F RVW MEDS BY RX/DR IN RCRD: CPT | Performed by: INTERNAL MEDICINE

## 2023-10-11 PROCEDURE — 99212 OFFICE O/P EST SF 10 MIN: CPT | Performed by: INTERNAL MEDICINE

## 2023-10-11 PROCEDURE — 99213 OFFICE O/P EST LOW 20 MIN: CPT | Performed by: PSYCHIATRY & NEUROLOGY

## 2023-10-11 PROCEDURE — 99213 OFFICE O/P EST LOW 20 MIN: CPT | Mod: GT,95 | Performed by: PSYCHIATRY & NEUROLOGY

## 2023-10-11 PROCEDURE — 1036F TOBACCO NON-USER: CPT | Performed by: INTERNAL MEDICINE

## 2023-10-11 ASSESSMENT — PAIN SCALES - GENERAL: PAINLEVEL: 0-NO PAIN

## 2023-10-11 NOTE — PROGRESS NOTES
Subjective   Patient ID: Elpidio Jamil is a 73 y.o. male who presents for Follow-up (Pt here for suture removal. Pt had skin biopsies 10/4/23 at [facility] [place]  LAST VISIT PLAN 7/10/2023  PATIENT'S DISCUSSION/SUMMARY:  Thank you for coming in for your annual.  Keep appointment with dermatology, let me know if you would prefer a second opinion with dermatology.  Ask about an ointment for the finger issue, different than the cream for the spots on your trunk.  Shoulder and cervical spine xray.  Schedule PRIYANKA testing with exercise in vascular lab.  If this is negative, then lumbar spine xray and nerve conduction test of the legs. Call .  Follow up 3 months.  Flu shot October.  PROVIDER'S IMPRESSIONS:  Encounter for subsequent annual wellness visit (AWV) in Medicare patient  Full code status  Encounter for annual physical exam  Encounter for screening for other disorder  Depression, major, single episode, mild (CMS/HCC)  Pre-diabetes  Anxiety disorder, unspecified type  Elevated triglycerides with high cholesterol  Benign prostatic hyperplasia (BPH) with urinary urgency  Calcification of coronary artery   Insomnia, unspecified type  Hypertension, unspecified type  Chronic right shoulder pain   Cramp of toe  Foot cramps  Rash  Annual visit for general adult medical examination with abnormal findings  Orders Placed  POCT glycosylated hemoglobin (Hb A1C) manually resulted  XR cervical spine complete 4-5 views  XR shoulder right 2+ views  Vascular US ankle brachial index (PRIYANKA) with exercise  Medication Changes  omega-3 acid ethyl esters 4 g oral Daily  END INFO FROM PRIOR VISIT    HPI  Here for suture removal from derm. 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  Incisions look good  All removed under aseptic procedure. No complications. (3).  Pathology reviewed with patient per his request.        Current Outpatient Medications   Medication Instructions    aspirin 81  mg, oral, Daily, with food usually in the am    atorvastatin (LIPITOR) 40 mg, oral, Nightly    CALCIUM CITRATE-VITAMIN D3 ORAL 1 tablet, oral, Daily    cetirizine (ZYRTEC) 10 mg capsule oral    clobetasol (Temovate) 0.05 % cream 2 times daily, APPLY SPARINGLY TO AFFECTED AREA(S)    cyanocobalamin, vitamin B-12, 1,000 mcg capsule 1 capsule, oral, 5 times weekly    dexlansoprazole (Dexilant) 30 mg DR capsule oral, Daily RT    diclofenac sodium 2 g, Apply to  right knee, twice to 3 times per day    famotidine (Pepcid) 40 mg tablet oral, 1 tab every evening per dr mooney:9/2021 gi trying to wean him from this to famotidine but so far gets hb back after 2 days of famo and has to take ppi 3rd night    FLUoxetine (PROZAC) 40 mg, oral, Daily, with 10mg to make 50mg    FLUoxetine (PROZAC) 10 mg, oral, Daily, with 40mg to equal 50mg daily    fluticasone (Flonase) 50 mcg/actuation nasal spray 2 sprays, Each Nostril, 2 times daily    lansoprazole (PREVACID) 15 mg, oral, Every other day    metoprolol tartrate (LOPRESSOR) 12.5 mg, oral, 2 times daily    NON FORMULARY Antioxidant Sunscreen SPF 50+ (Trillium [place])    omega-3 acid ethyl esters (LOVAZA) 4 g, oral, Daily    psyllium husk-calcium (Metamucil Plus Calcium) 1-60 gram-mg capsule 5 capsules, oral, Daily    tamsulosin (FLOMAX) 0.4 mg, oral, Daily    traZODone (DESYREL) 50 mg, oral, Nightly    triamcinolone (Kenalog) 0.1 % cream PLEASE SEE ATTACHED FOR DETAILED DIRECTIONS     Allergies   Allergen Reactions    Nirmatrelvir-Ritonavir Other and Nausea Only     Nausea     Objective   Lab Results   Component Value Date    WBC 8.1 10/05/2023    HGB 14.3 10/05/2023    HCT 43.5 10/05/2023     10/05/2023    CHOL 132 03/09/2023    TRIG 174 (H) 03/09/2023    HDL 36.0 (A) 03/09/2023    ALT 24 10/05/2023    AST 16 10/05/2023     10/05/2023    K 4.3 10/05/2023     10/05/2023    CREATININE 0.76 10/05/2023    BUN 17 10/05/2023    CO2 31 10/05/2023    TSH 1.45 03/09/2023  "   PSA 0.23 09/21/2023    GLUF 103 (H) 11/23/2021    HGBA1C 6.5 07/10/2023     Physical ExamBP 120/78 (BP Location: Right arm, Patient Position: Sitting, BP Cuff Size: Adult)   Pulse 65   Resp 18   Ht 1.778 m (5' 10\")   Wt 87.5 kg (193 lb)   SpO2 95%   BMI 27.69 kg/m²   Patient looks well and is in no obvious distress.  HEENT:   Normocephalic, no facial asymmetry  Eyes: Sclera and conjunctiva are clear.  Lungs : RR normal. Heart: RRR. Neuro: CN 2-12 intact. Alert, appropriate.  Ambulates independently.  No gross motor deficit.   No tremors.  Psych: normal mood and affect.  Skin: No rash, bruising petechiae or jaundice.  Sutures removed.  Assessment/Plan   Problem List Items Addressed This Visit    None  @[facility] [email or web address]@     "

## 2023-10-11 NOTE — PROGRESS NOTES
Follow-up visit    Visit type: Virtual follow-up    PCP: Katiuska Helm MD.    Subjective     Elpidio Jamil is a 73 y.o. year old male here for virtual follow-up. Last seen 10/12/22.    Patient is accompanied by: Other none .     Telehealth visit today. The patient was informed about the telehealth clinical encounter including benefits to avoiding travel, limitations of the assessment, and billing for the service. In-office care may be recommended if needed. Telehealth sessions are not being recorded and personal health information is protected. All questions were answered and verbal consent from the patient (or guardian) was obtained to proceed. Patient verbally confirmed, patient physically in [place].       HPI    I first saw him 2/4/2021 ref by ENT Dr Torres for IVONNE. Had PSG done at [facility]. Was snoring, was keeping wife awake. Went to see Dr Torres, and he ordered PSG. PSG done 10/28/2020 (ESS 10, 197 pounds) showed 4% overall AHI 10, REM AHI 27, min SPO2 81%, time with SPO2 less than 87% 11 minutes, PLM index 6.7, PLM arousal index 1.3. Patient followed up with Dr. Torres, and was referred to sleep medicine for further evaluation and treatment. 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 Some daytime sleepiness. Sometimes takes nap in the afternoon. Denies drowsy driving or car accident. ESS 4/24. No cardiac issues/CAD except PVCs. No stroke. No diabetes. I ordered auto-CPAP 5-15 cm H20 and he was doing well.    Here for follow-up today.  He is doing well.  He is using his CPAP nightly.  He is not having any issues.  He is getting supplies.  He denies any sleepiness or snoring.    CPAP download data showed:   Respironics Dreamstation Auto-CPAP  Date range: 9/9/2023-10/8/2023  Auto-CPAP 5-15 cm H20   Pressure (cm H20): mean 5.8, peak ave pressure 8.3, 90th%ile 7.3  % used days >=4 hours: 100%   Average daily usage (days used): 6: 35 h   Leak: Not an issue   Residual AHI: 2.7    Patient Active Problem  List   Diagnosis    Abnormal chest xray    Acne    Acquired elongated uvula    Agatston coronary artery calcium score between 200 and 399    Allergic rhinitis    Anxiety disorder    Arthritis    Benign prostatic hyperplasia (BPH) with urinary urgency    Calcification of coronary artery    Chronic pansinusitis    Depression, major, single episode, mild (CMS/HCC)    Eczema of hand    Elevated hemoglobin A1c    Elevated triglycerides with high cholesterol    Environmental and seasonal allergies    Esophageal reflux    Eustachian tube dysfunction, bilateral    Excessive ear wax, bilateral    Cerumen impaction    Hearing loss    IFG (impaired fasting glucose)    Hypercholesterolemia    Insomnia    Low vitamin B12 level    Neuritis of left ulnar nerve    IVONNE on CPAP    Other microscopic hematuria    Periodic limb movements of sleep    Pre-diabetes    Psoriasis    Sensorineural hearing loss (SNHL) of both ears    Asymmetric SNHL (sensorineural hearing loss)    Sleep-related hypoxia    Urinary frequency    History of iron deficiency    PVC (premature ventricular contraction)    Viral URI with cough    Overweight with body mass index (BMI) of 27 to 27.9 in adult    Overweight with body mass index (BMI) of 28 to 28.9 in adult    Bursitis of right shoulder    Claudication, intermittent (CMS/HCC)    History of esophageal stricture    Right shoulder pain       Allergies   Allergen Reactions    Nirmatrelvir-Ritonavir Other and Nausea Only     Nausea       Current Outpatient Medications:     aspirin 81 mg EC tablet, Take 1 tablet (81 mg) by mouth once daily. with food usually in the am, Disp: , Rfl:     atorvastatin (Lipitor) 40 mg tablet, Take 1 tablet (40 mg) by mouth once daily at bedtime., Disp: 90 tablet, Rfl: 3    CALCIUM CITRATE-VITAMIN D3 ORAL, Take 1 tablet by mouth 1 (one) time each day., Disp: , Rfl:     cetirizine (ZYRTEC) 10 mg capsule, Take by mouth., Disp: , Rfl:     cyanocobalamin, vitamin B-12, 1,000 mcg capsule,  Take 1 capsule by mouth 5 (five) times a week., Disp: , Rfl:     famotidine (Pepcid) 40 mg tablet, Take by mouth. 1 tab every evening per dr mooney:9/2021 gi trying to wean him from this to famotidine but so far gets hb back after 2 days of famo and has to take ppi 3rd night, Disp: , Rfl:     FLUoxetine (PROzac) 10 mg capsule, Take 1 capsule (10 mg) by mouth once daily. with 40mg to equal 50mg daily, Disp: 90 capsule, Rfl: 3    FLUoxetine (PROzac) 40 mg capsule, Take 1 capsule (40 mg) by mouth once daily. with 10mg to make 50mg, Disp: , Rfl:     fluticasone (Flonase) 50 mcg/actuation nasal spray, Administer 2 sprays into each nostril twice a day., Disp: , Rfl:     lansoprazole (Prevacid) 15 mg DR capsule, Take 1 capsule (15 mg) by mouth every other day., Disp: , Rfl:     metoprolol tartrate (Lopressor) 25 mg tablet, Take 0.5 tablets (12.5 mg) by mouth 2 times a day., Disp: 90 tablet, Rfl: 3    omega-3 acid ethyl esters (Lovaza) 1 gram capsule, Take 4 capsules (4 g) by mouth once daily., Disp: 360 capsule, Rfl: 3    psyllium husk-calcium (Metamucil Plus Calcium) 1-60 gram-mg capsule, Take 5 capsules by mouth 1 (one) time each day., Disp: , Rfl:     tamsulosin (Flomax) 0.4 mg 24 hr capsule, Take 1 capsule (0.4 mg) by mouth once daily., Disp: , Rfl:     traZODone (Desyrel) 50 mg tablet, Take 1 tablet (50 mg) by mouth once daily at bedtime., Disp: 90 tablet, Rfl: 3    clobetasol (Temovate) 0.05 % cream, twice a day. APPLY SPARINGLY TO AFFECTED AREA(S), Disp: , Rfl:     dexlansoprazole (Dexilant) 30 mg DR capsule, Take by mouth once daily., Disp: , Rfl:     diclofenac sodium 1 % kit, 2 g. Apply to  right knee, twice to 3 times per day, Disp: , Rfl:     NON FORMULARY, Antioxidant Sunscreen SPF 50+ (Trillium [place]), Disp: , Rfl:     triamcinolone (Kenalog) 0.1 % cream, PLEASE SEE ATTACHED FOR DETAILED DIRECTIONS, Disp: , Rfl:      Objective     There were no vitals taken for this visit.     Awake, alert, oriented x3, in  no distress  Well-nourished, seated    Mental status exam as above, conversant   No facial droop   Hearing grossly intact   No dysarthria       Assessment/Plan   Problem List Items Addressed This Visit             ICD-10-CM    IVONNE on CPAP - Primary G47.33     PSG done showed overall AHI 10, REM AHI 27, with some degree of sleep-related hypoxemia  On CPAP, using  s/p Respironics recall replacement device  DME Radha  Doing well, symptomatically benefiting from CPAP use         Sleep-related hypoxia G47.34     Continue CPAP use    Rtc 1 year with Tabitha Palencia CNP in neuro clinic (need CPAP DL)    All questions were answered.  Pt knows how to contact my office in case pt has any questions or concerns.    Refugio Ying MD

## 2023-10-11 NOTE — ASSESSMENT & PLAN NOTE
PSG done showed overall AHI 10, REM AHI 27, with some degree of sleep-related hypoxemia  On CPAP, using  s/p Respironics recall replacement device  ANABELLE Garcia  Doing well, symptomatically benefiting from CPAP use

## 2023-10-12 ENCOUNTER — TELEPHONE (OUTPATIENT)
Dept: DERMATOLOGY | Facility: CLINIC | Age: 73
End: 2023-10-12
Payer: MEDICARE

## 2023-10-12 ENCOUNTER — TREATMENT (OUTPATIENT)
Dept: PHYSICAL THERAPY | Facility: CLINIC | Age: 73
End: 2023-10-12
Payer: MEDICARE

## 2023-10-12 DIAGNOSIS — M25.511 PAIN IN RIGHT SHOULDER: ICD-10-CM

## 2023-10-12 DIAGNOSIS — M25.511 RIGHT SHOULDER PAIN, UNSPECIFIED CHRONICITY: Primary | ICD-10-CM

## 2023-10-12 LAB
PATH REPORT.FINAL DX SPEC: NORMAL
PATH REPORT.GROSS SPEC: NORMAL
PATH REPORT.RELEVANT HX SPEC: NORMAL
PATH REPORT.TOTAL CANCER: NORMAL

## 2023-10-12 PROCEDURE — 97035 APP MDLTY 1+ULTRASOUND EA 15: CPT | Mod: GP | Performed by: PHYSICAL THERAPIST

## 2023-10-12 PROCEDURE — 97110 THERAPEUTIC EXERCISES: CPT | Mod: GP | Performed by: PHYSICAL THERAPIST

## 2023-10-12 ASSESSMENT — PAIN - FUNCTIONAL ASSESSMENT: PAIN_FUNCTIONAL_ASSESSMENT: 0-10

## 2023-10-12 ASSESSMENT — PAIN SCALES - GENERAL: PAINLEVEL_OUTOF10: 0 - NO PAIN

## 2023-10-12 NOTE — PROGRESS NOTES
Physical Therapy    Physical Therapy Treatment    Patient Name: Elpidio Jamil  MRN: [id number]  Today's Date: 10/12/2023  Time Calculation  Start Time: 1434  Stop Time: 1515  Time Calculation (min): 41 min      Current Problem  1. Right shoulder pain, unspecified chronicity        2. Pain in right shoulder  PT eval and treat          Subjective :     Precautions  Precautions  STEADI Fall Risk Score (The score of 4 or more indicates an increased risk of falling): 0  Precautions Comment: None    Pain  Pain Assessment: 0-10  Pain Score: 0 - No pain  Pain Location: Shoulder  Pain Orientation: Right    Patient reports his shoulder is feeling pretty good. Denies pain at start of session.     Treatments:  Therapeutic Exercise  Therapeutic Exercise Performed: Yes  Stepper UE only 5 min  Pulley's 2 min Flex and ABD  SAPD Green, 3x10   Rows Grey, 3x10   ER/IR Green, 3x10 (R)  3 way ball roll out 3 min   Seated Thoracic Ext 5/5, 2 min  PARVIZ Red, 3x10   HABD Red, 2x10   Ball Stabs 0.5 kg, 30x CW and CCW   Wedge ER 5/5, 2 min HELD time  UTS 2x30 seconds (B) HELD time     Modalities  Modalities Used: Yes  Timed Ultrasound - Continuous US at 1 mhz and 1.5 w/cm2 for 8 min to (R) lateral shoulder    Manual Therapy  Manual Therapy Performed: NO  IASTM to (R) anterior, lateral, and posterior shoulder HELD     Objective   No objective measures assessed this visit     Assessment:   Patient tolerates session well. Continues to report some pain at end range flexion but abolishes immediately upon changing position. Progressing well.     Pt is progressing as expected towards goals  This session exercises were progressed (p) or added (NEW) as documented in treatment section.  Pt required minimal to moderate cues and demonstration to complete exercises with proper form and responded without adverse effects.  Pt continues to lack strength and motion necessary for the completion of baseline ADLs   Pt will benefit from further therapy to continue  to progress towards meeting functional and impairment goals.    Plan:     Continue to progress treatment to address strength, motion, jt mobility, soft tissue mobility, and flexibility deficits impacting patient's return to PLOF unrestricted and symptom free.     Goals:  Active       PT Problem       QuickDASH score of 0        Start:  10/02/23    Expected End:  12/01/23            Patient will reports no more than 0/10 pain in VAS scale with all ADLS, HHCs, and Self Care tasks.         Start:  10/02/23    Expected End:  12/01/23            Pt will demo improved AROM of R shoulder(s) to >/= 165 FLEX, 165 ABD, 90 ER, & functional IR to T8 for improved ease with functional activities with reaching, lifting, carrying for progression toward independence with ADL's & IADL's.         Start:  10/02/23    Expected End:  12/01/23            Pt will demo improved MMT by >/= 1 point on 0-5 point scale in BUE for improved strength and stability, and improved ease with lifting, carrying, and proper mechanics with ADL's & IADL's.         Start:  10/02/23    Expected End:  12/01/23            Patient will be able to lift 10 lbs from floor to waist without pain and good mechanics to allow for return to PLOF and ability to complete ADLs and HHCs without restriction.         Start:  10/02/23    Expected End:  12/01/23

## 2023-10-12 NOTE — TELEPHONE ENCOUNTER
10/12/2023  2:12 PM EDT Back to Top      Inflammation that is relatively nonspecific, but does not result in a diagnosis of lymphoma.  Will proceed to Multidisciplinary Cutaneous Lymphoma Conference, however for further consideration.  In meantime, medication reaction remains a strong possibility and we would like to see if the metoprolol can be replaced for 6 weeks with a different blood pressure medication from a different class.  Triamcinolone 0.1% ointment can be used for 3 weeks daily once the metorpolo has been replaced.      Pt notified of above results, per .   Pt has appt next week with PCP will discuss changing metropolol. Pt has Triamcinolone rx at home.

## 2023-10-16 ENCOUNTER — TUMOR BOARD CONFERENCE (OUTPATIENT)
Dept: HEMATOLOGY/ONCOLOGY | Facility: HOSPITAL | Age: 73
End: 2023-10-16
Payer: MEDICARE

## 2023-10-16 DIAGNOSIS — R21 RASH AND NONSPECIFIC SKIN ERUPTION: Primary | ICD-10-CM

## 2023-10-16 NOTE — TUMOR BOARD NOTE
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    Recommendation:  Continue withdrawal of metoprolol, consider stopping/switching meloxicam and trazodone.  Continue triamcinolone ointment.    Follow Up:  Jaylen De Leon      History and Physical Exam  Dermatologic History:   73 y.o. male with a intermittently pruritic rash for the past 2-3 years that first started on his abdomen and spread to his genital area, right palmar surface, and most recently to his left medial foot.  He has been using topical steroids including clobetasol and triamcinolone for these lesions with some improvement, however lesions never completely cleared.    Outside biopsy of the abdomen on 8- showed mild spongiosis with lymphocyte exocytosis and occasional collections of langerhans cells in the epidermis.  The lymphocytes stain with antibodies against CD3, with a rare cell in the dermis staining with antibodies against CD20.  The ratio of CD4 to CD8 in the epidermis is greater than 4:1.  There are occasional CD30 positive cells in the dermis and epidermis.  These findings could be seen in a drug eruption, or contact dermatitis, although with the CD30 positive staining, early cutaneous T-cell lymphoma cannot be excluded.  The collections of langerhans cells in the epidermis and spongiosis are atypical for cutaneous T-cell lymphoma, mycosis fungoides type.    Seen by Dr. De Leon on 10-4-2023, at which time he'd been off topical steroids for 4 weeks.  KOH negative.  CBC with diff, CMP, and JOSIE/LILLIAN all unremarkable.    Three biopsies performed on 10-4-2023.  Left periumbilical abdomen showed a superficial interstitial  lymphocytic infiltrate.  The findings could be seen in partially treated cutaneous T-cell lymphoma, mycosis fungoides type although a drug eruption cannot be excluded.     Right abdomen showed spongiosis with a superficial lymphocytic infiltrate.  The findings favor a spongiotic dermatitis over cutaneous T-cell lymphoma although the latter cannot be excluded.    Left hallux metatarsophalangeal joint showed parakeratosis overlying orthokeratosis with mild spongiosis.  The findings favor a partially resolving spongiotic dermatitis.  The differential diagnosis includes atopic dermatitis, nummular dermatitis, allergic contact dermatitis, id reaction, and eczematous drug reaction.  If Sézary syndrome is a clinical consideration then blood for flow cytometry is recommended.    Medications include metoprolol, meloxicam, and trazodone.  He denies starting or stopping any medications prior to the onset of the rash. Recommended to replace metoprolol for 6 weeks and use triamcinolone ointment for 3 weeks.    Past Medical History:    High cholesterol  PVC (premature ventricular contraction)  GERD    Medications:    Current Outpatient Medications:     aspirin 81 mg EC tablet, Take 1 tablet (81 mg) by mouth once daily. with food usually in the am, Disp: , Rfl:     atorvastatin (Lipitor) 40 mg tablet, Take 1 tablet (40 mg) by mouth once daily at bedtime., Disp: 90 tablet, Rfl: 3    CALCIUM CITRATE-VITAMIN D3 ORAL, Take 1 tablet by mouth 1 (one) time each day., Disp: , Rfl:     cetirizine (ZYRTEC) 10 mg capsule, Take by mouth., Disp: , Rfl:     clobetasol (Temovate) 0.05 % cream, twice a day. APPLY SPARINGLY TO AFFECTED AREA(S), Disp: , Rfl:     cyanocobalamin, vitamin B-12, 1,000 mcg capsule, Take 1 capsule by mouth 5 (five) times a week., Disp: , Rfl:     dexlansoprazole (Dexilant) 30 mg DR capsule, Take by mouth once daily., Disp: , Rfl:     diclofenac sodium 1 % kit, 2 g. Apply to  right knee, twice to 3 times per day, Disp: ,  Rfl:     famotidine (Pepcid) 40 mg tablet, Take by mouth. 1 tab every evening per dr mooney:9/2021 gi trying to wean him from this to famotidine but so far gets hb back after 2 days of famo and has to take ppi 3rd night, Disp: , Rfl:     FLUoxetine (PROzac) 10 mg capsule, Take 1 capsule (10 mg) by mouth once daily. with 40mg to equal 50mg daily, Disp: 90 capsule, Rfl: 3    FLUoxetine (PROzac) 40 mg capsule, Take 1 capsule (40 mg) by mouth once daily. with 10mg to make 50mg, Disp: , Rfl:     fluticasone (Flonase) 50 mcg/actuation nasal spray, Administer 2 sprays into each nostril twice a day., Disp: , Rfl:     lansoprazole (Prevacid) 15 mg DR capsule, Take 1 capsule (15 mg) by mouth every other day., Disp: , Rfl:     metoprolol tartrate (Lopressor) 25 mg tablet, Take 0.5 tablets (12.5 mg) by mouth 2 times a day., Disp: 90 tablet, Rfl: 3    omega-3 acid ethyl esters (Lovaza) 1 gram capsule, Take 4 capsules (4 g) by mouth once daily., Disp: 360 capsule, Rfl: 3    psyllium husk-calcium (Metamucil Plus Calcium) 1-60 gram-mg capsule, Take 5 capsules by mouth 1 (one) time each day., Disp: , Rfl:     tamsulosin (Flomax) 0.4 mg 24 hr capsule, Take 1 capsule (0.4 mg) by mouth once daily., Disp: , Rfl:     traZODone (Desyrel) 50 mg tablet, Take 1 tablet (50 mg) by mouth once daily at bedtime., Disp: 90 tablet, Rfl: 3    triamcinolone (Kenalog) 0.1 % cream, PLEASE SEE ATTACHED FOR DETAILED DIRECTIONS, Disp: , Rfl:     Allergies:    Allergies   Allergen Reactions    Nirmatrelvir-Ritonavir Other and Nausea Only     Nausea        Skin:  Pink to erythematous patches with scale of the chest, abdomen, and left foot      Lab Studies  CBC and Auto Differential              Component  Ref Range & Units 11 d ago  (10/5/23) 7 mo ago  (3/9/23) 1 yr ago  (9/1/22) 1 yr ago  (3/29/22) 2 yr ago  (7/26/21) 3 yr ago  (7/21/20) 3 yr ago  (11/12/19)   WBC  4.4 - 11.3 x10*3/uL 8.1 8.9 R 6.2 R 6.6 R 6.5 R 6.3 R 6.3 R   nRBC  0.0 - 0.0 /100 WBCs 0.0  0.0 R 0.0 R 0.0 R 0.0 R 0.0 R 0.0 R   RBC  4.50 - 5.90 x10*6/uL 4.63 4.62 R 4.48 Low  R, CM 4.61 R 4.73 R 4.79 R 4.62 R   Hemoglobin  13.5 - 17.5 g/dL 14.3 14.3 14.4 14.7 14.7 14.8 14.4   Hematocrit  41.0 - 52.0 % 43.5 43.6 43.8 43.7 43.4 45.2 43.8   MCV  80 - 100 fL 94 94 98 95 92 94 95   MCH  26.0 - 34.0 pg 30.9         MCHC  32.0 - 36.0 g/dL 32.9 32.8 32.9 33.6 33.9 32.7 32.9   RDW  11.5 - 14.5 % 12.8 12.6 12.7 12.6 12.4 12.3 12.6   Platelets  150 - 450 x10*3/uL 222 220 R 206 R 204 R 223 R 236 R 221 R   MPV  7.5 - 11.5 fL 9.1         Neutrophils %  40.0 - 80.0 % 58.3         Immature Granulocytes %, Automated  0.0 - 0.9 % 0.4         Comment: Immature Granulocyte Count (IG) includes promyelocytes, myelocytes and metamyelocytes but does not include bands. Percent differential counts (%) should be interpreted in the context of the absolute cell counts (cells/UL).   Lymphocytes %  13.0 - 44.0 % 26.7         Monocytes %  2.0 - 10.0 % 10.3         Eosinophils %  0.0 - 6.0 % 3.7         Basophils %  0.0 - 2.0 % 0.6         Neutrophils Absolute  1.60 - 5.50 x10*3/uL 4.75         Comment: Percent differential counts (%) should be interpreted in the context of the absolute cell counts (cells/uL).   Immature Granulocytes Absolute, Automated  0.00 - 0.50 x10*3/uL 0.03         Lymphocytes Absolute  0.80 - 3.00 x10*3/uL 2.17         Monocytes Absolute  0.05 - 0.80 x10*3/uL 0.84 High          Eosinophils Absolute  0.00 - 0.40 x10*3/uL 0.30         Basophils Absolute  0.00 - 0.10 x10*3/uL 0.05              Comprehensive metabolic panel            Component  Ref Range & Units 11 d ago  (10/5/23) 7 mo ago  (3/9/23) 1 yr ago  (9/1/22) 1 yr ago  (9/1/22) 1 yr ago  (6/16/22) 1 yr ago  (3/29/22) 2 yr ago  (7/26/21)   Glucose  74 - 99 mg/dL 101 High  112 High    113 High  117 High  112 High    Sodium  136 - 145 mmol/L 141 140   140 140 139   Potassium  3.5 - 5.3 mmol/L 4.3 4.3   4.1 4.3 4.1   Chloride  98 - 107 mmol/L 102 102   101  101 103   Bicarbonate  21 - 32 mmol/L 31 31   30 31 28   Anion Gap  10 - 20 mmol/L 12 11   13 12 12   Urea Nitrogen  6 - 23 mg/dL 17 12  11 15 16 12   Creatinine  0.50 - 1.30 mg/dL 0.76 0.73 0.66  0.80 0.82 0.75   eGFR  >60 mL/min/1.73m*2 >90         Comment: Calculations of estimated GFR are performed using the 2021 CKD-EPI Study Refit equation without the race variable for the IDMS-Traceable creatinine methods.  https://jasn.asnjournals.org/content/early/2021/09/22/ASN.1209077021   Calcium  8.6 - 10.6 mg/dL 9.6 9.3   9.4 9.4 9.1   Albumin  3.4 - 5.0 g/dL 4.4 4.4   4.3 4.2 4.5   Alkaline Phosphatase  33 - 136 U/L 45 53    51 55   Total Protein  6.4 - 8.2 g/dL 6.9 6.8    6.8 6.8   AST  9 - 39 U/L 16 13    14 15   Bilirubin, Total  0.0 - 1.2 mg/dL 0.6 0.8    0.6 0.6   ALT  10 - 52 U/L 24 15 CM    23 CM 30 CM   Comment: Patients treated with Sulfasalazine may generate falsely decreased results for ALT.        Josie-With Reflex To Sheyla   JOSIE  Negative Negative   Comment: The Antinuclear Antibody (JOSIE) test was performed using  indirect immunofluorescence assay with HEp-2 cells slide.     Anti-SM  <1.0 AI <0.2   Comment: < 1.0 = NEGATIVE  >=1.0 = POSITIVE   Anti-RNP  <1.0 AI <0.2   Comment: < 1.0 = NEGATIVE  >=1.0 = POSITIVE   Anti-SM/RNP  <1.0 AI <0.2   Comment: < 1.0 = NEGATIVE  >=1.0 = POSITIVE   Anti-SSA  <1.0 AI <0.2   Comment: < 1.0 = NEGATIVE  >=1.0 = POSITIVE   Anti-SSB  <1.0 AI <0.2   Comment: < 1.0 = NEGATIVE  >=1.0 = POSITIVE   Anti-SCL-70  <1.0 AI <0.2   Comment: < 1.0 = NEGATIVE  >=1.0 = POSITIVE   Anti-JOHNNY-1 IgG  <1.0 AI <0.2   Comment: < 1.0 = NEGATIVE  >=1.0 = POSITIVE   Anti-Chromatin  <1.0 AI <0.2   Comment: < 1.0 = NEGATIVE  >=1.0 = POSITIVE   Anti-Centromere  <1.0 AI <0.2   Comment: < 1.0 = NEGATIVE  >=1.0 = POSITIVE   ANTI-RIBOSOMAL P  <1.0 AI <0.2   Comment: < 1.0 = NEGATIVE  >=1.0 = POSITIVE   Anti-DNA (DS)  <5.0 IU/mL <1.0   Comment: NEGATIVE: <= 4 IU/ML  EQUIVOCAL: 5- 9 IU/ML  POSITIVE: >=10 IU/ML      Biopsy Results:  Dermatopathology- DERM LAB: [id number]   A. SKIN, LEFT PERIUMBILICAL ABDOMEN. PUNCH BIOPSY:   SUPERFICIAL INTERSTITIAL LYMPHOCYTIC INFILTRATE WITH FIBROSIS, SEE NOTE.     Note: Microscopic examination reveals a specimen that extends into the deep reticular dermis. There is a superficial interstitial lymphocytic infiltrate with fibrosis. There is very focal lymphocyte exocytosis.      These findings could be seen in partially treated cutaneous T-cell lymphoma,  mycosis fungoides type although a drug eruption cannot be excluded.     B. SKIN, RIGHT ABDOMEN (SIDE) - UPPER, PUNCH BIOPSY:   SPONGIOSIS WITH A SUPERFICIAL LYMPHOCYTIC INFILTRATE, SEE NOTE.     Note: Microscopic examination reveals a specimen that extends into the deep reticular dermis. There is orthokeratosis with mild spongiosis and focal lymphocyte exocytosis of small lymphocytes and there is a mild superficial lymphocytic infiltrate with erythrocyte extravasation.     These findings favor a spongiotic dermatitis over cutaneous T-cell lymphoma although the latter cannot be excluded.     C. SKIN, LEFT HALLUX METATARSOPHALANGEAL JOINT, PUNCH BIOPSY:   PARAKERATOSIS OVERLYING ORTHOKERATOSIS WITH MILD SPONGIOSIS, SEE NOTE.     Note: Microscopic examination reveals a specimen that extends into the subcutaneous fat. There is parakeratosis that overlies orthokeratosis and there is mild spongiosis in areas. A PAS stain is negative for fungus.All control slides stain appropriately.     These findings favor a partially resolving spongiotic dermatitis. The differential diagnosis includes atopic dermatitis, nummular dermatitis, allergic contact dermatitis, id reaction, and eczematous drug reaction. If Sézary syndrome is a clinical consideration then blood for flow cytometry is recommended.      ** Electronically signed out by Natalie Saenz MD **    _____________________________________________________________________________________________________________________________  Surgical Pathology Consult External Block(s) and/or Slide(s): [id number]   10 STAINED SLIDES/8 UNSTAINED SLIDES, [facility], #[id number] (BX: 08/25/2023)      SKIN, ABDOMEN, PUNCH BIOPSY:  MILD SPONGIOSIS WITH LYMPHOCYTE EXOCYTOSIS AND OCCASIONAL COLLECTIONS OF LANGERHANS CELLS IN THE EPIDERMIS, SEE NOTE.     Note: Microscopic examination reveals a specimen that extends into the deep reticular dermis. There is focal lymphocyte exocytosis with mild spongiosis and there is a collection of langerhans cells in the epidermis. There is a mild superficial interstitial lymphocytic infiltrate. A PAS stain is negative for fungus. The lymphocytes stain with antibodies against CD3, with a rare cell in the dermis staining with antibodies against CD20. The ratio of CD4 to CD8 in the epidermis is greater than 4:1. There are occasional CD30 positive cells in the dermis and epidermis. All control slides stain appropriately.     These findings could be seen in a drug eruption, or contact dermatitis, although with the CD30 positive staining, early cutaneous T-cell lymphoma cannot be excluded. The collections of langerhans cells in the epidermis and spongiosis are atypical for cutaneous T-cell lymphoma, mycosis fungoides type.     ** Electronically signed out by Natalie Saenz MD **      Images:  10/4/2023

## 2023-10-17 ENCOUNTER — TREATMENT (OUTPATIENT)
Dept: PHYSICAL THERAPY | Facility: CLINIC | Age: 73
End: 2023-10-17
Payer: MEDICARE

## 2023-10-17 ENCOUNTER — TELEPHONE (OUTPATIENT)
Dept: DERMATOLOGY | Facility: CLINIC | Age: 73
End: 2023-10-17
Payer: MEDICARE

## 2023-10-17 DIAGNOSIS — M25.511 PAIN IN RIGHT SHOULDER: ICD-10-CM

## 2023-10-17 PROCEDURE — 97140 MANUAL THERAPY 1/> REGIONS: CPT | Mod: GP | Performed by: PHYSICAL THERAPIST

## 2023-10-17 PROCEDURE — 97110 THERAPEUTIC EXERCISES: CPT | Mod: GP | Performed by: PHYSICAL THERAPIST

## 2023-10-17 ASSESSMENT — PAIN - FUNCTIONAL ASSESSMENT: PAIN_FUNCTIONAL_ASSESSMENT: 0-10

## 2023-10-17 ASSESSMENT — PAIN SCALES - GENERAL: PAINLEVEL_OUTOF10: 5 - MODERATE PAIN

## 2023-10-17 NOTE — PROGRESS NOTES
Physical Therapy    Physical Therapy Treatment    Patient Name: Elpidio Jamil  MRN: [id number]  Today's Date: 10/17/2023  Time Calculation  Start Time: 1516  Stop Time: 1557  Time Calculation (min): 41 min      Current Problem  1. Pain in right shoulder  PT eval and treat          Subjective :     Precautions  Precautions  STEADI Fall Risk Score (The score of 4 or more indicates an increased risk of falling): 0  Precautions Comment: None    Pain  Pain Assessment: 0-10  Pain Score: 5 - Moderate pain  Pain Location: Shoulder  Pain Orientation: Right, Anterior    Patient reports (R) upper arm is sore and painful today at 4-5/10 on VAS. States that pain began last night of unknown cause and has remained consistent since. States possible aggravating factors include walking dog and/or pushing weight through arms.     Treatments:  Therapeutic Exercise  Therapeutic Exercise Performed: Yes  Stepper UE only 5 min  Pulley's 2 min Flex and ABD  SAPD Blue, 3x10 (p, resistance)   Rows Grey & Green, 3x10 (p, resistance)   ER/IR Green, 3x10 (R)  3 way ball roll out 3 min   Seated Thoracic Ext 5/5, 2 min  PARVIZ Red, 3x10   HABD Red, 2x10   Ball Stabs 0.5 kg, 30x CW and CCW HELD time constraint.     Modalities  Modalities Used: Yes  Timed Ultrasound - Continuous US at 1 mhz and 1.5 w/cm2 for 8 min to (R) lateral shoulder    Manual Therapy  Manual Therapy Performed: Yes  IASTM to (R) anterior, lateral, and posterior shoulder     Objective   No objective measures assessed this visit       Assessment:   Patient responds well to MT this date with reports of reduced pain and improved comfort in (R) shoulder. Patient with strength and tissue mobility deficits impacting and contributing to ongoing pain.   Pt is progressing as expected towards goals  This session exercises were progressed (p) or added (NEW) as documented in treatment section.  Pt required minimal to moderate cues and demonstration to complete exercises with proper form and  responded without adverse effects.      Plan:     Continue to progress treatment to address strength, motion, jt mobility, soft tissue mobility, and flexibility deficits impacting patient's return to PLOF unrestricted and symptom free.     Goals:  Active       PT Problem       QuickDASH score of 0        Start:  10/02/23    Expected End:  12/01/23            Patient will reports no more than 0/10 pain in VAS scale with all ADLS, HHCs, and Self Care tasks.         Start:  10/02/23    Expected End:  12/01/23            Pt will demo improved AROM of R shoulder(s) to >/= 165 FLEX, 165 ABD, 90 ER, & functional IR to T8 for improved ease with functional activities with reaching, lifting, carrying for progression toward independence with ADL's & IADL's.         Start:  10/02/23    Expected End:  12/01/23            Pt will demo improved MMT by >/= 1 point on 0-5 point scale in BUE for improved strength and stability, and improved ease with lifting, carrying, and proper mechanics with ADL's & IADL's.         Start:  10/02/23    Expected End:  12/01/23            Patient will be able to lift 10 lbs from floor to waist without pain and good mechanics to allow for return to PLOF and ability to complete ADLs and HHCs without restriction.         Start:  10/02/23    Expected End:  12/01/23

## 2023-10-17 NOTE — TELEPHONE ENCOUNTER
The patient, Elpidio Jamil, was called and we discussed the results of the multidisciplinary lymphoma conference.  We discussed that his rash is more likely a drug eruption or spongiotic dermatitis, and less likely CTCL.  We recommended that he stop/switch his metoprolol and use triamcinolone ointment.  If no improvement after 6 weeks, consider stopping switching trazodone and meloxicam.  Per patient, doesn't take the meloxicam very often, but does take the trazodone regularly.  He has appointments coming up with his primary care doctor and cardiologist to discuss stopping/switching the metoprolol.  He understood the final assessment and treatment plan and had no further questions.

## 2023-10-18 ENCOUNTER — OFFICE VISIT (OUTPATIENT)
Dept: PRIMARY CARE | Facility: CLINIC | Age: 73
End: 2023-10-18
Payer: MEDICARE

## 2023-10-18 VITALS
RESPIRATION RATE: 16 BRPM | WEIGHT: 189 LBS | SYSTOLIC BLOOD PRESSURE: 100 MMHG | HEIGHT: 70 IN | DIASTOLIC BLOOD PRESSURE: 52 MMHG | BODY MASS INDEX: 27.06 KG/M2 | HEART RATE: 68 BPM

## 2023-10-18 DIAGNOSIS — R21 RASH AND NONSPECIFIC SKIN ERUPTION: ICD-10-CM

## 2023-10-18 DIAGNOSIS — Z23 NEED FOR INFLUENZA VACCINATION: ICD-10-CM

## 2023-10-18 DIAGNOSIS — F32.0 DEPRESSION, MAJOR, SINGLE EPISODE, MILD (CMS-HCC): ICD-10-CM

## 2023-10-18 DIAGNOSIS — I49.3 PVC (PREMATURE VENTRICULAR CONTRACTION): Primary | ICD-10-CM

## 2023-10-18 DIAGNOSIS — F41.9 ANXIETY DISORDER, UNSPECIFIED TYPE: ICD-10-CM

## 2023-10-18 DIAGNOSIS — R73.03 PRE-DIABETES: ICD-10-CM

## 2023-10-18 DIAGNOSIS — E78.00 HYPERCHOLESTEROLEMIA: ICD-10-CM

## 2023-10-18 LAB — POC HEMOGLOBIN A1C: 6 % (ref 4.2–6.5)

## 2023-10-18 PROCEDURE — 90662 IIV NO PRSV INCREASED AG IM: CPT | Performed by: INTERNAL MEDICINE

## 2023-10-18 PROCEDURE — 1159F MED LIST DOCD IN RCRD: CPT | Performed by: INTERNAL MEDICINE

## 2023-10-18 PROCEDURE — 1160F RVW MEDS BY RX/DR IN RCRD: CPT | Performed by: INTERNAL MEDICINE

## 2023-10-18 PROCEDURE — 3008F BODY MASS INDEX DOCD: CPT | Performed by: INTERNAL MEDICINE

## 2023-10-18 PROCEDURE — 1126F AMNT PAIN NOTED NONE PRSNT: CPT | Performed by: INTERNAL MEDICINE

## 2023-10-18 PROCEDURE — 83036 HEMOGLOBIN GLYCOSYLATED A1C: CPT | Performed by: INTERNAL MEDICINE

## 2023-10-18 PROCEDURE — 1036F TOBACCO NON-USER: CPT | Performed by: INTERNAL MEDICINE

## 2023-10-18 PROCEDURE — G0008 ADMIN INFLUENZA VIRUS VAC: HCPCS | Performed by: INTERNAL MEDICINE

## 2023-10-18 PROCEDURE — 99214 OFFICE O/P EST MOD 30 MIN: CPT | Performed by: INTERNAL MEDICINE

## 2023-10-18 RX ORDER — FLUOXETINE HYDROCHLORIDE 40 MG/1
40 CAPSULE ORAL DAILY
Qty: 90 CAPSULE | Refills: 2 | Status: SHIPPED | OUTPATIENT
Start: 2023-10-18 | End: 2024-05-14

## 2023-10-18 ASSESSMENT — PAIN SCALES - GENERAL: PAINLEVEL: 0-NO PAIN

## 2023-10-18 NOTE — PROGRESS NOTES
Subjective   Patient ID: Elpidio Jamil is a 73 y.o. male who presents for Follow-up (Pt here for follow up and review. ).  LAST VISIT PLAN 10/11/23  Problem List Items Addressed This Visit    None  @[email or web address]@     END INFO FROM PRIOR VISIT  HPI  Here to te erazo on pre dm, tho last A1c 6.5, hld, insomnia, depression/anxiety on ssri,rash seeing derm and question about t cell lymphoma or mycosis f.  Recent tumor board decision from dr hoang de leon: reviewed the notes.  Seen by Dr. De Leon on 10-4-2023, at which time he'd been off topical steroids for 4 weeks.  KOH negative.  CBC with diff, CMP, and JOSIE/LILLIAN all unremarkable.   Recommendation:  Continue withdrawal of metoprolol, consider stopping/switching meloxicam and trazodone.  Continue triamcinolone ointment.       Metoprolol currently is 1/2 of 25 mg twice per day.  BP is low.  Was given to him for pvc's.    Will stop trazodone too.  Has not been taking meloxicam.    Saw dr peres and xray showed calcific tendonitis and had cortisone shot and is in 3rd week PT.  It is helping.  Right shoulder.  Is avoiding surgery.    Seems tired, he admits he woke up at 4 am today and was having similar issues earlier in the week.  Not napping.    Scribe Transcription:  He had a skin biopsy and there was a group decision and they don’t think it is a T cell lymphoma or mycosis fungoides and they think it is a drug reaction. They want him off of his metoprolol and trazodone.     He saw his orthopedic surgeon, Dr. Polanco, who did an xray that showed calcification tendonitis. He was given a cortisone shot and now he is in his third week of PT.     Scribe Attestation  By signing my name below, I, Leo Leblanc   attest that this documentation has been prepared under the direction and in the presence of Katiuska Helm MD.   Review of Systems    Current Outpatient Medications   Medication Instructions    aspirin 81 mg, oral, Daily, with food usually in the am    atorvastatin  "(LIPITOR) 40 mg, oral, Nightly    CALCIUM CITRATE-VITAMIN D3 ORAL 1 tablet, oral, Daily    cetirizine (ZYRTEC) 10 mg capsule oral    cyanocobalamin, vitamin B-12, 1,000 mcg capsule 1 capsule, oral, 5 times weekly    famotidine (Pepcid) 40 mg tablet oral, 1 tab every evening per dr mooney:9/2021 gi trying to wean him from this to famotidine but so far gets hb back after 2 days of famo and has to take ppi 3rd night    FLUoxetine (PROZAC) 10 mg, oral, Daily, with 40mg to equal 50mg daily    FLUoxetine (PROZAC) 40 mg, oral, Daily, with 10mg to make 50mg    fluticasone (Flonase) 50 mcg/actuation nasal spray 2 sprays, Each Nostril, 2 times daily    lansoprazole (PREVACID) 15 mg, oral, Every other day    omega-3 acid ethyl esters (LOVAZA) 4 g, oral, Daily    psyllium husk-calcium (Metamucil Plus Calcium) 1-60 gram-mg capsule 5 capsules, oral, Daily    tamsulosin (FLOMAX) 0.4 mg, oral, Daily    triamcinolone (Kenalog) 0.1 % cream PLEASE SEE ATTACHED FOR DETAILED DIRECTIONS     Allergies   Allergen Reactions    Nirmatrelvir-Ritonavir Other and Nausea Only     Nausea     Objective   Lab Results   Component Value Date    WBC 8.1 10/05/2023    HGB 14.3 10/05/2023    HCT 43.5 10/05/2023     10/05/2023    CHOL 132 03/09/2023    TRIG 174 (H) 03/09/2023    HDL 36.0 (A) 03/09/2023    ALT 24 10/05/2023    AST 16 10/05/2023     10/05/2023    K 4.3 10/05/2023     10/05/2023    CREATININE 0.76 10/05/2023    BUN 17 10/05/2023    CO2 31 10/05/2023    TSH 1.45 03/09/2023    PSA 0.23 09/21/2023    GLUF 103 (H) 11/23/2021    HGBA1C 6.0 10/18/2023   All above stable.    Physical ExamBP 100/52 (BP Location: Left arm, Patient Position: Sitting, BP Cuff Size: Adult)   Pulse 68   Resp 16   Ht 1.778 m (5' 10\")   Wt 85.7 kg (189 lb)   BMI 27.12 kg/m²       10/25/2022     9:21 AM 11/10/2022     9:41 AM 3/14/2023     2:31 PM 6/12/2023     1:16 PM 7/10/2023     1:43 PM 10/11/2023     7:24 AM 10/18/2023     3:08 PM   Vitals " "  Systolic 113 104 100 112 106 120 100   Diastolic 73 68 66 72 65 78 52   Heart Rate 56 60 60 59 66 65 68   Temp    36.2 °C (97.2 °F)      Resp  16 18 16 22 18 16   Height (in) 1.778 m (5' 10\")  1.778 m (5' 10\") 1.778 m (5' 10\") 1.753 m (5' 9\") 1.778 m (5' 10\") 1.778 m (5' 10\")   Weight (lb) 192 191 192 191 195.4 193 189   BMI 27.55 kg/m2 27.41 kg/m2 27.55 kg/m2 27.41 kg/m2 28.86 kg/m2 27.69 kg/m2 27.12 kg/m2   BSA (m2) 2.07 m2 2.07 m2 2.07 m2 2.07 m2 2.08 m2 2.08 m2 2.06 m2   Visit Report   Report  Report Report Report       Patient looks well and is in no obvious distress.  HEENT:   Normocephalic, no facial asymmetry  Eyes: Sclera and conjunctiva are clear.  Neck: No adenopathy cervical (Ant/post/lat), no Supraclavicular nodes.   Thyroid normal.   Carotid pulses normal, no carotid bruits.  Lungs : RR normal. Clear to auscultation anterior, posterior and lateral. No rales, wheezes rhonchi or rubs. Good air exchange.  Heart: RRR. No Murmur, gallop, click or rub.  Abdomen: Bowel sounds normal, No bruits. No pulsatile mass. No hepatosplenomegaly, masses or tenderness. Soft, no guarding.  Vascular:  Posterior tibialis and dorsalis pedis pulses within normal limits bilaterally.   Extremities: no upper extremity edema. No lower extremity edema.   Musculoskeletal: no synovitis of joints seen. No new deformity.  Neuro: CN 2-12 intact. Alert, appropriate.   Ambulates independently.  No gross motor deficit.   No tremors.  Psych: normal mood and affect.  Skin: No obvious or new rash, bruising petechiae or jaundice.   He is not having claudication, removed from problem list.    Elpidio was seen today for follow-up.  Diagnoses and all orders for this visit:  Pre-diabetes (Primary)  -     POCT glycosylated hemoglobin (Hb A1C) manually resulted  -     Collection of Capillary Blood Specimen; Future  Anxiety disorder, unspecified type  -     FLUoxetine (PROzac) 40 mg capsule; Take 1 capsule (40 mg) by mouth once daily. with 10mg to " make 50mg  Need for influenza vaccination  -     Flu vaccine, quadrivalent, high-dose, preservative free, age 65y+ (FLUZONE)  Hypercholesterolemia  Depression, major, single episode, mild (CMS/HCC)  Rash and nonspecific skin eruption  PVC (premature ventricular contraction)      Will see how he does without the bet a blockade. If palpitation perhaps a calcium channel blocker, defer to cardiology. He has a well timed follow up with cardiology once he stops the metoprolol to see how he is doing.  Pre dm is better, continue lower glycemic index diet and weight loss and exercise efforts, discussed.  Mood is doing fine on ssri continue the 50mg daily dose fluoxetine.    Chol continue statin and watch labs.    See wrap up section for patient instructions and  additional treatment plan.

## 2023-10-18 NOTE — PATIENT INSTRUCTIONS
Decrease metoprolol tartrate (lopressor) to 1/2 tab once per day for 6 days and then stop.  Keep Oct  31 appointment with Dr You.     If rash is not changed in 4 weeks, then wean off Trazodone 1/2 tab daily for couple days then stop.   If you go off of trazodone and cannot sleep,   You could temporarily take benadryl if you cannot sleep, 25 mg.    Please keep a calendar that you can share with derm and myself on dates you stopped meds.    Change readers to blue light blocking glasses, should be bob colored.  Flu shot done today.  Updated covid vaccine .    Follow up 3-4 months.

## 2023-10-19 ENCOUNTER — TREATMENT (OUTPATIENT)
Dept: PHYSICAL THERAPY | Facility: CLINIC | Age: 73
End: 2023-10-19
Payer: MEDICARE

## 2023-10-19 DIAGNOSIS — M25.511 RIGHT SHOULDER PAIN, UNSPECIFIED CHRONICITY: Primary | ICD-10-CM

## 2023-10-19 DIAGNOSIS — M25.511 PAIN IN RIGHT SHOULDER: ICD-10-CM

## 2023-10-19 PROCEDURE — 97035 APP MDLTY 1+ULTRASOUND EA 15: CPT | Mod: GP | Performed by: PHYSICAL THERAPIST

## 2023-10-19 PROCEDURE — 97110 THERAPEUTIC EXERCISES: CPT | Mod: GP | Performed by: PHYSICAL THERAPIST

## 2023-10-19 ASSESSMENT — PAIN - FUNCTIONAL ASSESSMENT: PAIN_FUNCTIONAL_ASSESSMENT: 0-10

## 2023-10-19 ASSESSMENT — PAIN SCALES - GENERAL: PAINLEVEL_OUTOF10: 3

## 2023-10-19 NOTE — PROGRESS NOTES
"Physical Therapy    Physical Therapy Treatment/Re-Check/Discharge    Patient Name: Elpidio Jamil  MRN: [id number]  Today's Date: 10/19/2023  Time Calculation  Start Time: 1115  Stop Time: 1155  Time Calculation (min): 40 min      Current Problem  1. Right shoulder pain, unspecified chronicity        2. Pain in right shoulder  PT eval and treat          Subjective :     Precautions  Precautions  STEADI Fall Risk Score (The score of 4 or more indicates an increased risk of falling): 0  Precautions Comment: None    Pain  Pain Assessment: 0-10  Pain Score: 3  Pain Location: Shoulder  Pain Orientation: Right, Outer    Patient reports that while doing activity he \"doesn't think about it\" with regards to shoulder pain. Overall, reports he feels condition is improved since starting PT.     Treatments:  Therapeutic Exercise  Therapeutic Exercise Performed: Yes  Stepper UE only 5 min  Pulley's 2 min Flex and ABD  SAPD Blue, 3x10   Rows Grey & Green, 3x10   ER/IR Green, 3x10 (R)  3 way ball roll out 3 min   Seated Thoracic Ext 5/5, 2 min  PARVIZ Red, 3x10   HABD Red, 2x10   Ball Stabs 0.5 kg, 30x CW and CCW   HEP completed, updated, provided, and reviewed.   Access Code: [email or web address]  URL: [email or web address]  Date: 10/19/2023  Prepared by: Shari Scott    Exercises  - Shoulder extension with resistance - Neutral  - 1 x daily - 7 x weekly - 3 sets - 10 reps  - Standing Shoulder Row with Anchored Resistance  - 1 x daily - 7 x weekly - 3 sets - 10 reps  - Shoulder External Rotation with Anchored Resistance  - 1 x daily - 7 x weekly - 3 sets - 10 reps  - Shoulder Internal Rotation with Resistance  - 1 x daily - 7 x weekly - 3 sets - 10 reps  - Seated Bilateral Shoulder Flexion Towel Slide at Table Top  - 1 x daily - 7 x weekly - 3 sets - 10 reps  - Seated Thoracic Lumbar Extension with Pectoralis Stretch  - 1 x daily - 7 x weekly - 3 sets - 10 reps  - Shoulder External Rotation and Scapular Retraction with " Resistance  - 1 x daily - 7 x weekly - 3 sets - 10 reps  - Standing Shoulder Horizontal Abduction with Resistance  - 1 x daily - 7 x weekly - 3 sets - 10 reps  - Shoulder Circles on Wall with Towel  - 1 x daily - 7 x weekly - 3 sets - 10 reps    Modalities  Modalities Used: Yes  Timed Ultrasound - Continuous US at 1 mhz and 1.5 w/cm2 for 8 min to (R) lateral shoulder       Objective   Posture: Fwd Head, Rounded Shoulders     Upper Extremity ROM:  Date: eval RIGHT LEFT   Shoulder Flexion WNL p! WNL   Shoulder Abduction WNL WNL   Shoulder ER WNL WNL   Shoulder IR - Functional Behind back Reach  WNL WNL   All planes equal in motion (B)     Upper Extremity Strength:  Date: eval RIGHT LEFT   Shoulder Flexion 4/5 p! 4+/5   Shoulder Abduction 5/5 5/5   Shoulder ER 4/5 5/5   Shoulder IR 5/5 5/5       Outcome Measures:  Other Measures  Disability of Arm Shoulder Hand (DASH): 6.82     Assessment:  Patient is appropriate for d/c from PT at this time. Patient has met goals for PT. Patient has reached max benefit from skilled PT care. Mild pain ongoing with ADLs however, patient is independent and safe with HEP to continue with self-care of condition at home with HEP.      Plan:   Discharge from skilled care to home with HEP.     Goals:  Active       PT Problem       QuickDASH score of 0  (Adequate for Discharge)       Start:  10/02/23    Expected End:  12/01/23            Patient will reports no more than 0/10 pain in VAS scale with all ADLS, HHCs, and Self Care tasks.   (Adequate for Discharge)       Start:  10/02/23    Expected End:  12/01/23            Pt will demo improved AROM of R shoulder(s) to >/= 165 FLEX, 165 ABD, 90 ER, & functional IR to T8 for improved ease with functional activities with reaching, lifting, carrying for progression toward independence with ADL's & IADL's.   (Met)       Start:  10/02/23    Expected End:  12/01/23    Resolved:  10/19/23         Pt will demo improved MMT by >/= 1 point on 0-5 point  scale in BUE for improved strength and stability, and improved ease with lifting, carrying, and proper mechanics with ADL's & IADL's.   (Met)       Start:  10/02/23    Expected End:  23    Resolved:  10/19/23         Patient will be able to lift 10 lbs from floor to waist without pain and good mechanics to allow for return to PLOF and ability to complete ADLs and HHCs without restriction.   (Met)       Start:  10/02/23    Expected End:  23    Resolved:  10/19/23            Physical Therapy    Discharge Summary    Name: Elpidio Jamil  MRN: [id number]  : 1950  Date: 10/19/23    Discharge Summary: PT    Discharge Information: Date of discharge 10/19/23, Date of last visit 10/19/23, Date of evaluation 23, Number of attended visits 8, Referred by Dr. Austin Polanco , and Referred for Right shoulder pain     Therapy Summary:  Addressed strength, ROM, and tissue mobility deficits contributing to ongoing pain.     Discharge Status: Goals met, independent with HEP     Rehab Discharge Reason: Achieved all and/or the most significant goals(s) and Patient requested due to states he feels able to continue independently at home wit HEP.

## 2023-10-22 PROBLEM — I73.9 CLAUDICATION, INTERMITTENT (CMS-HCC): Status: RESOLVED | Noted: 2023-09-06 | Resolved: 2023-10-22

## 2023-10-24 ENCOUNTER — APPOINTMENT (OUTPATIENT)
Dept: PHYSICAL THERAPY | Facility: CLINIC | Age: 73
End: 2023-10-24
Payer: MEDICARE

## 2023-10-26 ENCOUNTER — APPOINTMENT (OUTPATIENT)
Dept: PHYSICAL THERAPY | Facility: CLINIC | Age: 73
End: 2023-10-26
Payer: MEDICARE

## 2023-10-31 ENCOUNTER — APPOINTMENT (OUTPATIENT)
Dept: PHYSICAL THERAPY | Facility: CLINIC | Age: 73
End: 2023-10-31
Payer: MEDICARE

## 2023-10-31 ENCOUNTER — OFFICE VISIT (OUTPATIENT)
Dept: CARDIOLOGY | Facility: CLINIC | Age: 73
End: 2023-10-31
Payer: MEDICARE

## 2023-10-31 VITALS
OXYGEN SATURATION: 95 % | WEIGHT: 192.8 LBS | BODY MASS INDEX: 27.6 KG/M2 | SYSTOLIC BLOOD PRESSURE: 116 MMHG | HEART RATE: 101 BPM | DIASTOLIC BLOOD PRESSURE: 77 MMHG | HEIGHT: 70 IN

## 2023-10-31 DIAGNOSIS — E78.2 ELEVATED TRIGLYCERIDES WITH HIGH CHOLESTEROL: ICD-10-CM

## 2023-10-31 DIAGNOSIS — I25.84 CALCIFICATION OF CORONARY ARTERY: Primary | ICD-10-CM

## 2023-10-31 DIAGNOSIS — E78.00 HYPERCHOLESTEROLEMIA: ICD-10-CM

## 2023-10-31 DIAGNOSIS — R93.1 AGATSTON CORONARY ARTERY CALCIUM SCORE BETWEEN 200 AND 399: ICD-10-CM

## 2023-10-31 DIAGNOSIS — I49.3 PVC (PREMATURE VENTRICULAR CONTRACTION): ICD-10-CM

## 2023-10-31 DIAGNOSIS — I25.10 CALCIFICATION OF CORONARY ARTERY: Primary | ICD-10-CM

## 2023-10-31 PROCEDURE — 1159F MED LIST DOCD IN RCRD: CPT | Performed by: STUDENT IN AN ORGANIZED HEALTH CARE EDUCATION/TRAINING PROGRAM

## 2023-10-31 PROCEDURE — 99214 OFFICE O/P EST MOD 30 MIN: CPT | Performed by: STUDENT IN AN ORGANIZED HEALTH CARE EDUCATION/TRAINING PROGRAM

## 2023-10-31 PROCEDURE — 93010 ELECTROCARDIOGRAM REPORT: CPT | Performed by: STUDENT IN AN ORGANIZED HEALTH CARE EDUCATION/TRAINING PROGRAM

## 2023-10-31 PROCEDURE — 1160F RVW MEDS BY RX/DR IN RCRD: CPT | Performed by: STUDENT IN AN ORGANIZED HEALTH CARE EDUCATION/TRAINING PROGRAM

## 2023-10-31 PROCEDURE — 99214 OFFICE O/P EST MOD 30 MIN: CPT | Mod: 25 | Performed by: STUDENT IN AN ORGANIZED HEALTH CARE EDUCATION/TRAINING PROGRAM

## 2023-10-31 PROCEDURE — 3008F BODY MASS INDEX DOCD: CPT | Performed by: STUDENT IN AN ORGANIZED HEALTH CARE EDUCATION/TRAINING PROGRAM

## 2023-10-31 PROCEDURE — 1126F AMNT PAIN NOTED NONE PRSNT: CPT | Performed by: STUDENT IN AN ORGANIZED HEALTH CARE EDUCATION/TRAINING PROGRAM

## 2023-10-31 PROCEDURE — 1036F TOBACCO NON-USER: CPT | Performed by: STUDENT IN AN ORGANIZED HEALTH CARE EDUCATION/TRAINING PROGRAM

## 2023-10-31 PROCEDURE — 93005 ELECTROCARDIOGRAM TRACING: CPT | Performed by: STUDENT IN AN ORGANIZED HEALTH CARE EDUCATION/TRAINING PROGRAM

## 2023-10-31 RX ORDER — ATENOLOL 25 MG/1
25 TABLET ORAL DAILY
Qty: 30 TABLET | Refills: 0 | Status: SHIPPED | OUTPATIENT
Start: 2023-10-31 | End: 2023-12-04 | Stop reason: SDUPTHER

## 2023-10-31 ASSESSMENT — COLUMBIA-SUICIDE SEVERITY RATING SCALE - C-SSRS
6. HAVE YOU EVER DONE ANYTHING, STARTED TO DO ANYTHING, OR PREPARED TO DO ANYTHING TO END YOUR LIFE?: NO
2. HAVE YOU ACTUALLY HAD ANY THOUGHTS OF KILLING YOURSELF?: NO
1. IN THE PAST MONTH, HAVE YOU WISHED YOU WERE DEAD OR WISHED YOU COULD GO TO SLEEP AND NOT WAKE UP?: NO

## 2023-10-31 ASSESSMENT — PATIENT HEALTH QUESTIONNAIRE - PHQ9
1. LITTLE INTEREST OR PLEASURE IN DOING THINGS: NOT AT ALL
SUM OF ALL RESPONSES TO PHQ9 QUESTIONS 1 AND 2: 0
2. FEELING DOWN, DEPRESSED OR HOPELESS: NOT AT ALL

## 2023-10-31 ASSESSMENT — PAIN SCALES - GENERAL: PAINLEVEL: 0-NO PAIN

## 2023-10-31 NOTE — PROGRESS NOTES
"Chief Complaint:   No chief complaint on file.     History Of Present Illness:    Elpidio Jamil is a 73 y.o. male returns to clinic for follow-up appointment.  He has been in usual state of health since his last appointment.  More recently however he is undergoing work-up for truncal rash.  After biopsies and additional considerations the consideration was a rash was related to drug exposure particular metoprolol.  Patient was since weaned off metoprolol.  He is not having more PVCs and symptoms.  He has also fatigue.  Denies lightheadedness dizziness or syncopal events.  Vital signs listed below.    Last Recorded Vitals:  Vitals:    10/31/23 1339   BP: 116/77   BP Location: Left arm   Patient Position: Sitting   BP Cuff Size: Adult   Pulse: 101   SpO2: 95%   Weight: 87.5 kg (192 lb 12.8 oz)   Height: 1.778 m (5' 10\")       Review of Systems  ROS      Allergies:  Nirmatrelvir-ritonavir    Outpatient Medications:  Current Outpatient Medications   Medication Instructions    aspirin 81 mg, oral, Daily, with food usually in the am    atenolol (TENORMIN) 25 mg, oral, Daily    atorvastatin (LIPITOR) 40 mg, oral, Nightly    CALCIUM CITRATE-VITAMIN D3 ORAL 1 tablet, oral, Daily    cetirizine (ZYRTEC) 10 mg capsule oral    cyanocobalamin, vitamin B-12, 1,000 mcg capsule 1 capsule, oral, 5 times weekly    famotidine (Pepcid) 40 mg tablet oral, 1 tab every evening per dr mooney:9/2021 gi trying to wean him from this to famotidine but so far gets hb back after 2 days of famo and has to take ppi 3rd night    FLUoxetine (PROZAC) 10 mg, oral, Daily, with 40mg to equal 50mg daily    FLUoxetine (PROZAC) 40 mg, oral, Daily, with 10mg to make 50mg    fluticasone (Flonase) 50 mcg/actuation nasal spray 2 sprays, Each Nostril, 2 times daily    lansoprazole (PREVACID) 15 mg, oral, Every other day    omega-3 acid ethyl esters (LOVAZA) 4 g, oral, Daily    psyllium husk-calcium (Metamucil Plus Calcium) 1-60 gram-mg capsule 5 capsules, " oral, Daily    tamsulosin (FLOMAX) 0.4 mg, oral, Daily    triamcinolone (Kenalog) 0.1 % cream PLEASE SEE ATTACHED FOR DETAILED DIRECTIONS       Physical Exam:  General: No acute distress,  A&O x3  Skin: Warm and dry  Neck: JVD is not elevated  ENT: Moist mucous membranes no lesions appreciated  Pulmonary: CTAB  Cards: Regular rate rhythm, no murmurs gallops or rubs appreciated normal S1-S2  Abdomen: Soft nontender nondistended  Extremities: No edema or cyanosis  Psych: Appropriate mood and affect        Last Labs:  CBC -  Lab Results   Component Value Date    WBC 8.1 10/05/2023    HGB 14.3 10/05/2023    HCT 43.5 10/05/2023    MCV 94 10/05/2023     10/05/2023       CMP -  Lab Results   Component Value Date    CALCIUM 9.6 10/05/2023    PHOS 4.1 06/16/2022    PROT 6.9 10/05/2023    ALBUMIN 4.4 10/05/2023    AST 16 10/05/2023    ALT 24 10/05/2023    ALKPHOS 45 10/05/2023    BILITOT 0.6 10/05/2023       LIPID PANEL -   Lab Results   Component Value Date    CHOL 132 03/09/2023    TRIG 174 (H) 03/09/2023    HDL 36.0 (A) 03/09/2023    CHHDL 3.7 03/09/2023    LDLF 61 03/09/2023    VLDL 35 03/09/2023    NHDL 93 03/29/2022       RENAL FUNCTION PANEL -   Lab Results   Component Value Date    GLUCOSE 101 (H) 10/05/2023     10/05/2023    K 4.3 10/05/2023     10/05/2023    CO2 31 10/05/2023    ANIONGAP 12 10/05/2023    BUN 17 10/05/2023    CREATININE 0.76 10/05/2023    GFRMALE >90 03/09/2023    CALCIUM 9.6 10/05/2023    PHOS 4.1 06/16/2022    ALBUMIN 4.4 10/05/2023        Lab Results   Component Value Date    HGBA1C 6.0 10/18/2023     Assessment and Plan    1. Coronary artery disease: Increase in calcium score to 398. Patient remains asymptomatic however concerned due to family history of premature MI. He is maintained on aspirin and statin therapy. Most recent lipids show mildly elevated LDLs. Previous exercise stress tests 4 years ago did not show evidence of ischemia by EKG. repeat nuclear stress test showed  no evidence of ischemia by perfusion or ECG. He is on atorvastatin 40 mg.  Cholesterol is at goal.  Continue current medication regimen for now.     2. History of PVCs: Traditionally controlled with low-dose metoprolol.  This has since been discontinued out of concern for drug related rash.  We will start atenolol 25 mg daily.  This can be increased as necessary for symptomatic relief.     3. Hyperlipidemia: LDLs are well controlled with current dose of atorvastatin. Slightly elevated triglycerides which we have him on his dietary and exercise compliance.      Philip You MD PhD

## 2023-11-02 ENCOUNTER — APPOINTMENT (OUTPATIENT)
Dept: PHYSICAL THERAPY | Facility: CLINIC | Age: 73
End: 2023-11-02
Payer: MEDICARE

## 2023-11-06 LAB
ATRIAL RATE: 82 BPM
P AXIS: 32 DEGREES
P OFFSET: 217 MS
P ONSET: 158 MS
PR INTERVAL: 134 MS
Q ONSET: 225 MS
QRS COUNT: 14 BEATS
QRS DURATION: 86 MS
QT INTERVAL: 378 MS
QTC CALCULATION(BAZETT): 441 MS
QTC FREDERICIA: 419 MS
R AXIS: -28 DEGREES
T AXIS: -18 DEGREES
T OFFSET: 414 MS
VENTRICULAR RATE: 82 BPM

## 2023-11-07 ENCOUNTER — APPOINTMENT (OUTPATIENT)
Dept: PHYSICAL THERAPY | Facility: CLINIC | Age: 73
End: 2023-11-07
Payer: MEDICARE

## 2023-11-09 ENCOUNTER — APPOINTMENT (OUTPATIENT)
Dept: PHYSICAL THERAPY | Facility: CLINIC | Age: 73
End: 2023-11-09
Payer: MEDICARE

## 2023-11-14 ENCOUNTER — APPOINTMENT (OUTPATIENT)
Dept: PHYSICAL THERAPY | Facility: CLINIC | Age: 73
End: 2023-11-14
Payer: MEDICARE

## 2023-11-16 ENCOUNTER — APPOINTMENT (OUTPATIENT)
Dept: PHYSICAL THERAPY | Facility: CLINIC | Age: 73
End: 2023-11-16
Payer: MEDICARE

## 2023-11-21 ENCOUNTER — APPOINTMENT (OUTPATIENT)
Dept: PHYSICAL THERAPY | Facility: CLINIC | Age: 73
End: 2023-11-21
Payer: MEDICARE

## 2023-11-29 DIAGNOSIS — R93.1 AGATSTON CORONARY ARTERY CALCIUM SCORE BETWEEN 200 AND 399: ICD-10-CM

## 2023-11-29 DIAGNOSIS — E78.00 HYPERCHOLESTEROLEMIA: ICD-10-CM

## 2023-11-29 DIAGNOSIS — I25.84 CALCIFICATION OF CORONARY ARTERY: ICD-10-CM

## 2023-11-29 DIAGNOSIS — I49.3 PVC (PREMATURE VENTRICULAR CONTRACTION): ICD-10-CM

## 2023-11-29 DIAGNOSIS — I25.10 CALCIFICATION OF CORONARY ARTERY: ICD-10-CM

## 2023-12-04 RX ORDER — ATENOLOL 25 MG/1
25 TABLET ORAL DAILY
Qty: 30 TABLET | Refills: 0 | Status: SHIPPED | OUTPATIENT
Start: 2023-12-04 | End: 2023-12-27 | Stop reason: SDUPTHER

## 2023-12-05 ENCOUNTER — APPOINTMENT (OUTPATIENT)
Dept: DERMATOLOGY | Facility: CLINIC | Age: 73
End: 2023-12-05
Payer: MEDICARE

## 2023-12-24 DIAGNOSIS — I49.3 PVC (PREMATURE VENTRICULAR CONTRACTION): ICD-10-CM

## 2023-12-24 DIAGNOSIS — I25.84 CALCIFICATION OF CORONARY ARTERY: ICD-10-CM

## 2023-12-24 DIAGNOSIS — I25.10 CALCIFICATION OF CORONARY ARTERY: ICD-10-CM

## 2023-12-24 DIAGNOSIS — R93.1 AGATSTON CORONARY ARTERY CALCIUM SCORE BETWEEN 200 AND 399: ICD-10-CM

## 2023-12-24 DIAGNOSIS — E78.00 HYPERCHOLESTEROLEMIA: ICD-10-CM

## 2023-12-27 RX ORDER — ATENOLOL 25 MG/1
25 TABLET ORAL DAILY
Qty: 30 TABLET | Refills: 11 | Status: SHIPPED | OUTPATIENT
Start: 2023-12-27

## 2024-01-29 ASSESSMENT — DERMATOLOGY QUALITY OF LIFE (QOL) ASSESSMENT
RATE HOW BOTHERED YOU ARE BY SYMPTOMS OF YOUR SKIN PROBLEM (EG, ITCHING, STINGING BURNING, HURTING OR SKIN IRRITATION): 3
RATE HOW BOTHERED YOU ARE BY EFFECTS OF YOUR SKIN PROBLEMS ON YOUR ACTIVITIES (EG, GOING OUT, ACCOMPLISHING WHAT YOU WANT, WORK ACTIVITIES OR YOUR RELATIONSHIPS WITH OTHERS): 2
RATE HOW EMOTIONALLY BOTHERED YOU ARE BY YOUR SKIN PROBLEM (FOR EXAMPLE, WORRY, EMBARRASSMENT, FRUSTRATION): 1
DATE THE QUALITY-OF-LIFE ASSESSMENT WAS COMPLETED: 66868
RATE HOW EMOTIONALLY BOTHERED YOU ARE BY YOUR SKIN PROBLEM (FOR EXAMPLE, WORRY, EMBARRASSMENT, FRUSTRATION): 1
RATE HOW BOTHERED YOU ARE BY SYMPTOMS OF YOUR SKIN PROBLEM (EG, ITCHING, STINGING BURNING, HURTING OR SKIN IRRITATION): 3
RATE HOW BOTHERED YOU ARE BY EFFECTS OF YOUR SKIN PROBLEMS ON YOUR ACTIVITIES (EG, GOING OUT, ACCOMPLISHING WHAT YOU WANT, WORK ACTIVITIES OR YOUR RELATIONSHIPS WITH OTHERS): 2

## 2024-01-29 ASSESSMENT — PATIENT GLOBAL ASSESSMENT (PGA): WHAT IS THE PGA: PATIENT GLOBAL ASSESSMENT:  2 - MILD

## 2024-01-30 ENCOUNTER — OFFICE VISIT (OUTPATIENT)
Dept: DERMATOLOGY | Facility: CLINIC | Age: 74
End: 2024-01-30
Payer: MEDICARE

## 2024-01-30 DIAGNOSIS — L29.9 PRURITUS: ICD-10-CM

## 2024-01-30 DIAGNOSIS — R21 RASH AND OTHER NONSPECIFIC SKIN ERUPTION: Primary | ICD-10-CM

## 2024-01-30 PROCEDURE — 3008F BODY MASS INDEX DOCD: CPT | Performed by: DERMATOLOGY

## 2024-01-30 PROCEDURE — 99214 OFFICE O/P EST MOD 30 MIN: CPT | Performed by: DERMATOLOGY

## 2024-01-30 PROCEDURE — 1126F AMNT PAIN NOTED NONE PRSNT: CPT | Performed by: DERMATOLOGY

## 2024-01-30 PROCEDURE — 1036F TOBACCO NON-USER: CPT | Performed by: DERMATOLOGY

## 2024-01-30 PROCEDURE — 1157F ADVNC CARE PLAN IN RCRD: CPT | Performed by: DERMATOLOGY

## 2024-01-30 PROCEDURE — 1159F MED LIST DOCD IN RCRD: CPT | Performed by: DERMATOLOGY

## 2024-01-30 RX ORDER — CLOBETASOL PROPIONATE 0.5 MG/G
OINTMENT TOPICAL 2 TIMES DAILY
Qty: 60 G | Refills: 3 | Status: SHIPPED | OUTPATIENT
Start: 2024-01-30 | End: 2024-01-30

## 2024-01-30 RX ORDER — CLOBETASOL PROPIONATE 0.5 MG/G
OINTMENT TOPICAL 2 TIMES DAILY
Qty: 60 G | Refills: 3 | Status: SHIPPED | OUTPATIENT
Start: 2024-01-30 | End: 2024-01-30 | Stop reason: ENTERED-IN-ERROR

## 2024-01-30 RX ORDER — CLOBETASOL PROPIONATE 0.5 MG/G
OINTMENT TOPICAL 2 TIMES DAILY
Qty: 60 G | Refills: 3 | Status: SHIPPED | OUTPATIENT
Start: 2024-01-30 | End: 2024-02-13

## 2024-01-30 RX ORDER — TRIAMCINOLONE ACETONIDE 1 MG/G
OINTMENT TOPICAL 2 TIMES DAILY
Qty: 454 G | Refills: 3 | Status: SHIPPED | OUTPATIENT
Start: 2024-01-30

## 2024-01-30 RX ORDER — TRAZODONE HYDROCHLORIDE 50 MG/1
50 TABLET ORAL NIGHTLY
COMMUNITY
End: 2024-05-28 | Stop reason: SDUPTHER

## 2024-01-30 NOTE — PROGRESS NOTES
Subjective     Elpidio Jamil is a 73 y.o. male who presents for the following: Drug Eruption (Improving since discontinue metoprolol. Using triamcinolone).   Patient had an intermittently pruritic rash for the past 2-3 years that first started on his abdomen and spread to his genital area, right palmar surface, and most recently to his left medial foot.  He has been using topical steroids including clobetasol and triamcinolone for these lesions with some improvement, however lesions never completely cleared.     Outside biopsy of the abdomen on 8- showed mild spongiosis with lymphocyte exocytosis and occasional collections of langerhans cells in the epidermis.  The lymphocytes stain with antibodies against CD3, with a rare cell in the dermis staining with antibodies against CD20.   There are occasional CD30 positive cells in the dermis and epidermis.  These findings could be seen in a drug eruption, or contact dermatitis, although with the CD30 positive staining, early cutaneous T-cell lymphoma cannot be excluded.  The collections of langerhans cells in the epidermis and spongiosis are atypical for cutaneous T-cell lymphoma, mycosis fungoides type.     Seen by Dr. De Leon on 10-4-2023, at which time he'd been off topical steroids for 4 weeks.  KOH negative.  CBC with diff, CMP, and JOSIE/LILLIAN all unremarkable.     Three biopsies performed on 10-4-2023.  Left periumbilical abdomen showed a superficial interstitial lymphocytic infiltrate.  The findings could be seen in partially treated cutaneous T-cell lymphoma, mycosis fungoides type although a drug eruption cannot be excluded.      Right abdomen showed spongiosis with a superficial lymphocytic infiltrate.  The findings favor a spongiotic dermatitis over cutaneous T-cell lymphoma although the latter cannot be excluded.     Left hallux metatarsophalangeal joint showed parakeratosis overlying orthokeratosis with mild spongiosis.  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  The differential diagnosis includes atopic dermatitis, nummular dermatitis, allergic contact dermatitis, id reaction, and eczematous drug reaction.  If Sézary syndrome is a clinical consideration then blood for flow cytometry is recommended.     Medications include metoprolol, meloxicam, and trazodone.  He denies starting or stopping any medications prior to the onset of the rash. Recommended to replace metoprolol for 6 weeks and use triamcinolone ointment for 3 weeks.    Patient was presented in lymphoma conference on 10/2023 favored spongiotic dermatitis or drug eruption over cutaneous T-cell lymphoma, mycosis fungoides type. Recommend patient withdrawal metoprolol, consider stopping/switching meloxicam, and trazodone.     Today, patient reports that he switched from metoprolol to atenolol in October, decreased his dose of trazadone from 50mg to 25mg, and stopped meloxicam.  Overall, he states he is 80% improved. However, he has developed new scaly plaques on his right hand.     Review of Systems:  No other skin or systemic complaints other than what is documented elsewhere in the note.    The following portions of the chart were reviewed this encounter and updated as appropriate:         Skin Cancer History  No skin cancer on file.      Specialty Problems          Dermatology Problems    Acne    Eczema of hand    Psoriasis        Objective   Well appearing patient in no apparent distress; mood and affect are within normal limits.    A full examination was performed including scalp, head, eyes, ears, nose, lips, neck, chest, axillae, abdomen, back, buttocks, bilateral upper extremities, bilateral lower extremities, hands, feet, fingers, toes, fingernails, and toenails. All findings within normal limits unless otherwise noted below.    Assessment/Plan   1. Rash and other nonspecific skin eruption  -On the right hand, there are multiple hyperkeratotic scaly plaques.   -On the  periumbilical area, right lateral mid chest, right lateral lower abdomen, and left upper thigh there are slightly erythematous patches.     Drug Eruption likely 2/2 meloxicam   -Patient had an intermittently pruritic rash for the past 2-3 years that improved with topical steroids.   -Outside biopsy of the abdomen on 8/25/2023 showed mild spongiosis with lymphocyte exocytosis and occasional collections of langerhans cells in the epidermis c/f drug eruption, contact dermatitis, or early CTCL (due to CD30 positive staining)  -KOH negative.  CBC with diff, CMP, and JOSIE/LILLIAN all unremarkable. Three biopsies performed on 10/4/23 (off steroids) showing spongiosis and interstitial lymphocytic infiltrate c/f partially treated CTCL, MF vs drug eruption vs spongiotic dermatitis.   -Patient was presented in lymphoma conference on 10/2023 favored spongiotic dermatitis or drug eruption over cutaneous T-cell lymphoma, mycosis fungoides type. Recommend patient withdrawal metoprolol, consider stopping/switching meloxicam, and trazodone.   -Today, patient reports that he switched from metoprolol to atenolol in October, decreasing his dose of trazadone from 50mg to 25mg, and stopped meloxicam.  Overall, he states he is 80% improved. However, has developed new scaly plaques on his right hand which are very bothersome.   -For lesions on the trunk: reduce use of triamcinolone 0.1% ointment to twice daily on the weekends (Sat/Sun). Start pramoxine containing cream/lotion every 1-2 hours during the week days to help control itch. If flaring, can increase use of triamcinolone to BID x 2 weeks.   -For lesions on the R hand: start clobetasol 0.05% ointment BID x 2 weeks. Then, reduce to triamcinolone 0.1% ointment twice daily on weekends. Can use pramoxine every 1-2 hours. Also may increase back to clobetasol 0.05% when flaring.   -Avoid use of NSAIDs as this can flare your rash.   -Discussed that patient can continue to follow with primary  dermatologist at [facility], but was instructed to call us if rash worsens/flares.     Related Procedures  Follow Up In Dermatology - Established Patient    Related Medications  clobetasol (Temovate) 0.05 % ointment  Apply topically 2 times a day for 14 days.    triamcinolone (Kenalog) 0.1 % ointment  Apply topically 2 times a day.    2. Pruritus    See above.       Gabby Barrett,      I saw and evaluated the patient. I personally obtained the key and critical portions of the history and physical exam or was physically present for key and critical portions performed by the resident/fellow. I reviewed the resident/fellow's documentation and discussed the patient with the resident/fellow. I agree with the resident/fellow's medical decision making as documented in the note and made changes where appropriate.

## 2024-01-30 NOTE — PATIENT INSTRUCTIONS
Mr. Jamil,     It was great seeing you in clinic today! We think your rash was likely due to meloxicam. Meloxicam is an NSAID - please be careful when using NSAIDs as this can flare your rash. If you need pain relief we recommend Tylenol/Acetaminophen.     For the rash on your body:   -We want to reduce your use of topical steroids as it can thin out your skin with long term consistent use.   -Please start pramoxine containing cream/lotion every 1-2 hours during the week and triamcinolone 0.1% ointment twice daily on the weekends.   -If you notice a flare of your rash can increase use to twice daily x 2 weeks.   -We provided you with hand outs with some examples of pramoxine containing products!    For the rash on your hands:    -Start clobetasol 0.05% ointment today twice daily x 2 weeks on your right hand.   -Then, reduce to triamcinolone 0.1% ointment twice daily on weekends.   -Can increase back to clobetasol 0.05% ointment twice daily x 2 weeks if your skin flares.   -Can also use pramoxine every 1-2 hours on your hands as well.

## 2024-02-25 NOTE — PROGRESS NOTES
Subjective   Patient ID: Elpidio Jamil is a 74 y.o. male who presents for Follow-up (Pt here for follow up and review. PT needs AA1C, last done 10/2023).  LAST VISIT PLAN 10/18/23  Diagnoses and all orders for this visit:  Pre-diabetes (Primary)  -     POCT glycosylated hemoglobin (Hb A1C) manually resulted  -     Collection of Capillary Blood Specimen; Future  Anxiety disorder, unspecified type  -     Fluoxetine (Prozac) 40 mg capsule; Take 1 capsule (40 mg) by mouth once daily. with 10 mg to make 50 mg  Need for influenza vaccination  -     Flu vaccine, quadrivalent, high-dose, preservative free, age 65y+ (FLUZONE)  Hypercholesterolemia  Depression, major, single episode, mild (CMS/HCC)  Rash and nonspecific skin eruption  PVC (premature ventricular contraction)      Will see how he does without the bet a blockade. If palpitation perhaps a calcium channel blocker, defer to cardiology. He has a well timed follow up with cardiology once he stops the metoprolol to see how he is doing.  Pre dm is better, continue lower glycemic index diet and weight loss and exercise efforts, discussed.  Mood is doing fine on ssri continue the 50 mg daily dose fluoxetine.    Chol continue statin and watch labs.    See wrap up section for patient instructions and  additional treatment plan.  END INFO FROM PRIOR VISIT  HPI    Dr De Leon told him if pt was content with his skin not perfect then he could stay on the beta blocker and he did have an episode of palpitations when off the metoprolol briefly after the taper. So dr mantilla cardio put him on atenolol. Skin is cleared up on this.  He does NOT have T cell lymphoma.  It was a drug reaction. He had several spots all over and they are gone.    A1c is 6.1 well controlled.  Was 6.0 previously.    Mood is fine, he is not without anxiety but is tolerable so will leave fluoxetine at 50 mg.    Dr collins is leaving, pt unaware, sees him for BPH and big prostate,gerts PSA every year, does  not get annual dare annually. Is tamsulosin but med works only soso , will refer to Dr Saunders. Pt had annual urology eval in sept so just go by sept.      Gerd: takes pepcid daily and prn lansoprazole--acid reflux and sometimes felt like something was stuck. Not sure if eating differently. Wife said he was sliding down on the elevated bed, 4-5 in. Did not come on with exercise.  Gone as of Friday. .   Dr pérez plan had been: Continue withdrawal of metoprolol, consider stopping/switching meloxicam and trazodone.   He no longer sees Dr De Leon as is better. Dr De Leon did not know about the skin splitting on his hand.  We never had to stop anything else and rash still went away.  Pt does not take meloxicam often, and trazodone he is taking .    Scribe Transcription:  Cardiac: No CP , palpitations, increased mcguire  Resp: No sob, wheezing, cough  Extremities: No leg or ankle swelling, no claudication  Neuro: No dizziness, syncope, blurred vision. No new headaches.     He denies any palpitations.    He states he has been struggling with indigestion for the past 3 weeks. He reports feeling a stuck food sensation. He states he has been exercising and hasn’t noticed it when exercising. We had a discussion about avoiding coffee beans and chocolate products after 6.     Scribe Attestation  By signing my name below, I, Leo Leblanc   attest that this documentation has been prepared under the direction and in the presence of Katiuska Helm MD.   Review of Systems  See ROS in HPI    Current Outpatient Medications   Medication Instructions    aspirin 81 mg, oral, Daily, with food usually in the am    atenolol (TENORMIN) 25 mg, oral, Daily    atorvastatin (LIPITOR) 40 mg, oral, Nightly    CALCIUM CITRATE-VITAMIN D3 ORAL 1 tablet, oral, Daily    cetirizine (ZYRTEC) 10 mg capsule oral    cyanocobalamin, vitamin B-12, 1,000 mcg capsule 1 capsule, oral, 5 times weekly    famotidine (Pepcid) 40 mg tablet oral, 1 tab every  "evening per dr mooney:9/2021 gi trying to wean him from this to famotidine but so far gets hb back after 2 days of famo and has to take ppi 3rd night    FLUoxetine (PROZAC) 40 mg, oral, Daily, with 10mg to make 50mg    FLUoxetine (PROZAC) 10 mg, oral, Daily, with 40mg to equal 50mg daily    fluticasone (Flonase) 50 mcg/actuation nasal spray 2 sprays, Each Nostril, 2 times daily    lansoprazole (Prevacid) 15 mg DR capsule Take one cap daily  30 minutes ac supper or breakfast during March 2024, then one cap daily prn breakthrough heartburn.    omega-3 acid ethyl esters (LOVAZA) 4 g, oral, Daily    psyllium husk-calcium (Metamucil Plus Calcium) 1-60 gram-mg capsule 5 capsules, oral, Daily    tamsulosin (FLOMAX) 0.4 mg, oral, Daily    traZODone (DESYREL) 50 mg, oral, Nightly, Patient takes 1/2    triamcinolone (Kenalog) 0.1 % ointment Topical, 2 times daily     Allergies   Allergen Reactions    Nirmatrelvir-Ritonavir Nausea/vomiting     Objective   Lab Results   Component Value Date    WBC 8.1 10/05/2023    HGB 14.3 10/05/2023    HCT 43.5 10/05/2023     10/05/2023    CHOL 132 03/09/2023    TRIG 174 (H) 03/09/2023    HDL 36.0 (A) 03/09/2023    ALT 24 10/05/2023    AST 16 10/05/2023     10/05/2023    K 4.3 10/05/2023     10/05/2023    CREATININE 0.76 10/05/2023    BUN 17 10/05/2023    CO2 31 10/05/2023    TSH 1.45 03/09/2023    PSA 0.23 09/21/2023    GLUF 103 (H) 11/23/2021    HGBA1C 6.1 02/26/2024     par  Physical ExamBP 102/62 (BP Location: Right arm, Patient Position: Sitting, BP Cuff Size: Adult)   Resp 18   Ht 1.765 m (5' 9.5\")   Wt 89.8 kg (198 lb)   BMI 28.82 kg/m²   Patient looks well and is in no obvious distress.  HEENT:   Normocephalic, no facial asymmetry  Eyes: Sclera and conjunctiva are clear.  Neck: No adenopathy cervical (Ant/post/lat), no Supraclavicular nodes.   Thyroid normal.   Carotid pulses normal, no carotid bruits.  Lungs : RR normal. Clear to auscultation anterior, " posterior and lateral. No rales, wheezes rhonchi or rubs. Good air exchange.  Heart: RRR. No Murmur, gallop, click or rub.  Abdomen: Bowel sounds normal, No bruits. No pulsatile mass. No hepatosplenomegaly, masses or tenderness. Soft, no guarding.  Vascular:  Posterior tibialis and dorsalis pedis pulses within normal limits bilaterally.   Extremities: no upper extremity edema. No lower extremity edema.   Musculoskeletal: no synovitis of joints seen. No new deformity.  Neuro: CN 2-12 intact. Alert, appropriate.   Ambulates independently.  No gross motor deficit.   No tremors.  Psych: normal mood and affect.  Skin: No rash, bruising petechiae or jaundice.          Assessment/Plan   Problem List Items Addressed This Visit       Allergic rhinitis    Relevant Medications    fluticasone (Flonase) 50 mcg/actuation nasal spray    Anxiety disorder    Relevant Medications    FLUoxetine (PROzac) 10 mg capsule    Depression, major, single episode, mild (CMS/HCC)    Relevant Medications    FLUoxetine (PROzac) 10 mg capsule    Esophageal reflux    Relevant Medications    lansoprazole (Prevacid) 15 mg DR capsule    Pre-diabetes    Relevant Orders    POCT glycosylated hemoglobin (Hb A1C) manually resulted (Completed)     Other Visit Diagnoses       Benign prostatic hyperplasia with lower urinary tract symptoms, symptom details unspecified    -  Primary    Relevant Orders    Referral to Urology          I am the primary care physician for this patient's ongoing medical care, which is managed during and in between office visits.      See wrap up section for patient instructions and  additional treatment plan.   Do not recline for 3 hours after eating.    Head of bed 4 inches elevation.    Pepcid continue at bedtime regularly.    ADD TEMPORARILY: lansoprazole 15 mg one per day , take 30 minutes before supper or before breakfast,  for 30 days. If you have recurrent reflux or feeling like something is sticking despite this, then call   [facility] for an appt. If the medication helps, but the problem recurs after stopping the lansoprazole, call Dr Dove.    Re cholesterol blood test, will plan to do in the fall with your other annual labs.    Follow up in 4 months : wellness visit and annual check up.  No labs due before that visit, will do labs in the fall.

## 2024-02-26 ENCOUNTER — OFFICE VISIT (OUTPATIENT)
Dept: PRIMARY CARE | Facility: CLINIC | Age: 74
End: 2024-02-26
Payer: MEDICARE

## 2024-02-26 VITALS
SYSTOLIC BLOOD PRESSURE: 102 MMHG | WEIGHT: 198 LBS | BODY MASS INDEX: 28.35 KG/M2 | DIASTOLIC BLOOD PRESSURE: 62 MMHG | HEIGHT: 70 IN | RESPIRATION RATE: 18 BRPM

## 2024-02-26 DIAGNOSIS — N40.1 BENIGN PROSTATIC HYPERPLASIA WITH LOWER URINARY TRACT SYMPTOMS, SYMPTOM DETAILS UNSPECIFIED: ICD-10-CM

## 2024-02-26 DIAGNOSIS — R73.03 PRE-DIABETES: ICD-10-CM

## 2024-02-26 DIAGNOSIS — K21.9 GASTROESOPHAGEAL REFLUX DISEASE, UNSPECIFIED WHETHER ESOPHAGITIS PRESENT: Primary | ICD-10-CM

## 2024-02-26 DIAGNOSIS — F32.0 DEPRESSION, MAJOR, SINGLE EPISODE, MILD (CMS-HCC): ICD-10-CM

## 2024-02-26 DIAGNOSIS — J30.89 NON-SEASONAL ALLERGIC RHINITIS DUE TO OTHER ALLERGIC TRIGGER: ICD-10-CM

## 2024-02-26 DIAGNOSIS — F41.9 ANXIETY DISORDER, UNSPECIFIED TYPE: ICD-10-CM

## 2024-02-26 PROBLEM — C86.6 PRIMARY CUTANEOUS CD30-POSITIVE T-CELL PROLIFERATIONS (MULTI): Status: ACTIVE | Noted: 2024-02-26

## 2024-02-26 PROBLEM — C86.60 PRIMARY CUTANEOUS CD30-POSITIVE T-CELL PROLIFERATIONS: Status: RESOLVED | Noted: 2024-02-26 | Resolved: 2024-02-26

## 2024-02-26 PROBLEM — C86.60 PRIMARY CUTANEOUS CD30-POSITIVE T-CELL PROLIFERATIONS: Status: ACTIVE | Noted: 2024-02-26

## 2024-02-26 PROBLEM — C86.6 PRIMARY CUTANEOUS CD30-POSITIVE T-CELL PROLIFERATIONS (MULTI): Status: RESOLVED | Noted: 2024-02-26 | Resolved: 2024-02-26

## 2024-02-26 LAB — POC HEMOGLOBIN A1C: 6.1 % (ref 4.2–6.5)

## 2024-02-26 PROCEDURE — 1126F AMNT PAIN NOTED NONE PRSNT: CPT | Performed by: INTERNAL MEDICINE

## 2024-02-26 PROCEDURE — G2211 COMPLEX E/M VISIT ADD ON: HCPCS | Performed by: INTERNAL MEDICINE

## 2024-02-26 PROCEDURE — 1036F TOBACCO NON-USER: CPT | Performed by: INTERNAL MEDICINE

## 2024-02-26 PROCEDURE — 99214 OFFICE O/P EST MOD 30 MIN: CPT | Performed by: INTERNAL MEDICINE

## 2024-02-26 PROCEDURE — 1159F MED LIST DOCD IN RCRD: CPT | Performed by: INTERNAL MEDICINE

## 2024-02-26 PROCEDURE — 1157F ADVNC CARE PLAN IN RCRD: CPT | Performed by: INTERNAL MEDICINE

## 2024-02-26 PROCEDURE — 1160F RVW MEDS BY RX/DR IN RCRD: CPT | Performed by: INTERNAL MEDICINE

## 2024-02-26 PROCEDURE — 83036 HEMOGLOBIN GLYCOSYLATED A1C: CPT | Performed by: INTERNAL MEDICINE

## 2024-02-26 PROCEDURE — 3008F BODY MASS INDEX DOCD: CPT | Performed by: INTERNAL MEDICINE

## 2024-02-26 RX ORDER — FLUOXETINE 10 MG/1
10 CAPSULE ORAL DAILY
Qty: 90 CAPSULE | Refills: 3 | Status: SHIPPED | OUTPATIENT
Start: 2024-02-26 | End: 2025-02-25

## 2024-02-26 RX ORDER — FLUTICASONE PROPIONATE 50 MCG
2 SPRAY, SUSPENSION (ML) NASAL 2 TIMES DAILY
Qty: 64 G | Refills: 3 | Status: SHIPPED | OUTPATIENT
Start: 2024-02-26

## 2024-02-26 RX ORDER — CALC/MAG/B COMPLEX/D3/HERB 61
TABLET ORAL
Qty: 30 CAPSULE | Refills: 2 | Status: SHIPPED | OUTPATIENT
Start: 2024-02-26

## 2024-02-26 NOTE — PATIENT INSTRUCTIONS
Do not recline for 3 hours after eating.    Head of bed 4 inches elevation.    Pepcid continue at bedtime regularly.    ADD TEMPORARILY: lansoprazole 15 mg one per day , take 30 minutes before supper or before breakfast,  for 30 days. If you have recurrent reflux or feeling like something is sticking despite this, then call Dr Dove for an appt. If the medication helps, but the problem recurs after stopping the lansoprazole, call Dr Dove.    Re cholesterol blood test, will plan to do in the fall with your other annual labs.    Follow up in 4 months : wellness visit and annual check up.  No labs due before that visit, will do labs in the fall.

## 2024-05-14 DIAGNOSIS — Z51.81 ENCOUNTER FOR MONITORING STATIN THERAPY: ICD-10-CM

## 2024-05-14 DIAGNOSIS — K21.9 GASTROESOPHAGEAL REFLUX DISEASE, UNSPECIFIED WHETHER ESOPHAGITIS PRESENT: ICD-10-CM

## 2024-05-14 DIAGNOSIS — I10 BENIGN ESSENTIAL HYPERTENSION: ICD-10-CM

## 2024-05-14 DIAGNOSIS — Z79.899 ENCOUNTER FOR MONITORING STATIN THERAPY: ICD-10-CM

## 2024-05-14 DIAGNOSIS — R73.03 PRE-DIABETES: ICD-10-CM

## 2024-05-14 DIAGNOSIS — N40.1 BENIGN PROSTATIC HYPERPLASIA (BPH) WITH URINARY URGENCY: ICD-10-CM

## 2024-05-14 DIAGNOSIS — R79.89 LOW VITAMIN B12 LEVEL: ICD-10-CM

## 2024-05-14 DIAGNOSIS — E78.2 ELEVATED TRIGLYCERIDES WITH HIGH CHOLESTEROL: ICD-10-CM

## 2024-05-14 DIAGNOSIS — E78.00 HYPERCHOLESTEROLEMIA: Primary | ICD-10-CM

## 2024-05-14 DIAGNOSIS — R39.15 BENIGN PROSTATIC HYPERPLASIA (BPH) WITH URINARY URGENCY: ICD-10-CM

## 2024-05-28 DIAGNOSIS — F32.0 DEPRESSION, MAJOR, SINGLE EPISODE, MILD (CMS-HCC): ICD-10-CM

## 2024-05-28 RX ORDER — TRAZODONE HYDROCHLORIDE 50 MG/1
50 TABLET ORAL NIGHTLY
Qty: 14 TABLET | Refills: 0 | Status: SHIPPED | OUTPATIENT
Start: 2024-05-28

## 2024-05-28 RX ORDER — TRAZODONE HYDROCHLORIDE 50 MG/1
50 TABLET ORAL NIGHTLY PRN
Qty: 90 TABLET | Refills: 2 | Status: SHIPPED | OUTPATIENT
Start: 2024-05-28 | End: 2025-05-28

## 2024-05-28 NOTE — TELEPHONE ENCOUNTER
Pt wants refill sent to [facility] for trazadone. Then wants 90 days sent to [facility]. Sent for approval

## 2024-06-17 ENCOUNTER — OFFICE VISIT (OUTPATIENT)
Dept: OTOLARYNGOLOGY | Facility: CLINIC | Age: 74
End: 2024-06-17
Payer: MEDICARE

## 2024-06-17 ENCOUNTER — APPOINTMENT (OUTPATIENT)
Dept: AUDIOLOGY | Facility: CLINIC | Age: 74
End: 2024-06-17
Payer: MEDICARE

## 2024-06-17 VITALS — BODY MASS INDEX: 27.92 KG/M2 | HEIGHT: 70 IN | WEIGHT: 195 LBS

## 2024-06-17 DIAGNOSIS — H90.3 SENSORINEURAL HEARING LOSS (SNHL) OF BOTH EARS: Primary | ICD-10-CM

## 2024-06-17 DIAGNOSIS — H61.21 IMPACTED CERUMEN OF RIGHT EAR: ICD-10-CM

## 2024-06-17 PROCEDURE — 69210 REMOVE IMPACTED EAR WAX UNI: CPT | Performed by: STUDENT IN AN ORGANIZED HEALTH CARE EDUCATION/TRAINING PROGRAM

## 2024-06-17 PROCEDURE — 1157F ADVNC CARE PLAN IN RCRD: CPT | Performed by: STUDENT IN AN ORGANIZED HEALTH CARE EDUCATION/TRAINING PROGRAM

## 2024-06-17 PROCEDURE — 1036F TOBACCO NON-USER: CPT | Performed by: STUDENT IN AN ORGANIZED HEALTH CARE EDUCATION/TRAINING PROGRAM

## 2024-06-17 PROCEDURE — 92557 COMPREHENSIVE HEARING TEST: CPT | Performed by: AUDIOLOGIST

## 2024-06-17 PROCEDURE — 1159F MED LIST DOCD IN RCRD: CPT | Performed by: STUDENT IN AN ORGANIZED HEALTH CARE EDUCATION/TRAINING PROGRAM

## 2024-06-17 PROCEDURE — 99214 OFFICE O/P EST MOD 30 MIN: CPT | Performed by: STUDENT IN AN ORGANIZED HEALTH CARE EDUCATION/TRAINING PROGRAM

## 2024-06-17 PROCEDURE — 92567 TYMPANOMETRY: CPT | Performed by: AUDIOLOGIST

## 2024-06-17 RX ORDER — MELOXICAM 15 MG/1
15 TABLET ORAL
COMMUNITY
Start: 2023-09-18

## 2024-06-17 NOTE — PROGRESS NOTES
COMPREHENSIVE AUDIOMETRIC EVALUATION      Name:  Elpidio Jamil  :  1950  Age:  74 y.o.  Date of Evaluation:  24   Referring Provider:  Dr. Bolden     History:  Mr. Jamil was seen today for an evaluation of hearing.  Please recall, patient has a known bilateral sensorineural hearing loss, for which he utilizes hearing aids.  When asked, patient denied otalgia, aural fullness, tinnitus, history of noise exposure, and concerns for decreased hearing sensitivity    See audiometric evaluation at end of this report or scanned under media tab    OTOSCOPY:       Right Ear: Minimal non-occluding cerumen       Left Ear: Minimal non-occluding cerumen    226 Hz TYMPANOMETRY:       Right Ear: Type A: normal peak pressure, compliance, and ear canal volume, consistent with middle ear function within normal limits       Left Ear: Type A: normal peak pressure, compliance, and ear canal volume, consistent with middle ear function within normal limits    AUDIOMETRIC EVALUATION (Phones):       Right Ear: Normal through 2000 Hz sloping to moderate sensorineural hearing loss notch centered at 4000 Hz            Left Ear: Normal through 2000 Hz sloping to moderately severe sensorineural hearing loss notch centered at 4000 Hz           NOTE: Decrease in left hearing sensitivity at 3000 Hz as compared to 2023 evaluation    Test technique:  Standard Audiometry  Reliability:   good    SPEECH RECOGNITION THRESHOLD:       Right Ear:  5 dBHL in good agreement with PTA       Left Ear:  10 dBHL in good agreement with PTA    WORD RECOGNITION:       Right Ear:  excellent (100%) at normal presentation level       Left Ear:  excellent (100%) at normal presentation level    DISCUSSION:   Discussed results and recommendations with patient.  Questions were addressed and the patient was encouraged to contact our department should concerns arise.    RECOMMENDATIONS:  -Recommend patient return should concerns for changes in hearing  sensitivity arise or as medically indicated.   -Recommend patient return for hearing aid check.  -Recommend patient continue consistent utilization of hearing aids    Lissy Oliveros, CCC-A     Appt: 1:00 - 1:30 PM

## 2024-06-17 NOTE — PROGRESS NOTES
Assessment  Bilateral sensorineural hearing loss  Cerumen impaction     Plan  MRI 6/24/22: No CPA/ IAC lesions/ masses   Right cerumen impaction removed, preventive measure discussed  Audiogram performed today notable for bilateral sensorineural hearing loss with slight progression in his left ear at 3000 Hz, otherwise minimal changes.  Using hearing aids with benefit  We will monitor his hearing with yearly audiograms  RTC 6m           History of Present Illness  6/17/24  The patient present for follow-up, endorses right cerumen buildup, otherwise no otologic complaints.  Using hearing aids with benefit.  8/16/22 telehealth visit  MRI IAC reviewed with the patient, no evidence of CN VII/VIII pathology noted. Currently using hearing aids with benefit. No otologic complaints.  Recall   72-year-old male presenting for evaluation of hearing loss and right cerumen impaction. The patient has a history of bilateral sensorineural hearing loss, uses hearing aids with benefits. He is due for his yearly audiogram. The patient reports cerumen buildup along his right EAC for several months which can interfere with his hearing aid use. No other otologic complaint.  Denies ear pain, vertigo, itching, discharge from ear, aural fullness or autophony.   Denies past otological history including history of prior ear surgery, noise exposure, exposure to ototoxic drugs or agents, and/or family history of hearing loss.    Past Medical History:   Diagnosis Date    Abdominal distension (gaseous) 03/23/2022    Abdominal bloating    Abnormal findings on diagnostic imaging of heart and coronary circulation     Abnormal computed tomography angiography of heart    Acute upper respiratory infection, unspecified 08/03/2022    Upper respiratory infection with cough and congestion    Benign neoplasm of colon, unspecified 12/11/2012    Benign neoplasm of large intestine    Benign paroxysmal vertigo, unspecified ear 03/01/2017    BPV (benign  positional vertigo)    Benign prostatic hyperplasia without lower urinary tract symptoms 08/05/2021    Benign prostatic hyperplasia without lower urinary tract symptoms    Body mass index (BMI) 27.0-27.9, adult 08/22/2019    BMI 27.0-27.9,adult    Body mass index (BMI) 28.0-28.9, adult 10/28/2021    BMI 28.0-28.9,adult    COVID-19 11/10/2022    COVID-19 virus infection    COVID-19 virus infection 03/14/2023    Encounter for follow-up examination after completed treatment for conditions other than malignant neoplasm 08/29/2018    Hospital discharge follow-up    Encounter for immunization 08/22/2019    Need for shingles vaccine    Encounter for immunization 06/28/2017    Need for Tdap vaccination    Encounter for screening for malignant neoplasm of prostate 11/22/2014    Special screening examination for neoplasm of prostate    Encounter for screening for malignant neoplasm of prostate 07/08/2018    Screening for prostate cancer    Encounter for screening for other viral diseases 08/05/2021    Need for hepatitis C screening test    Impacted cerumen, bilateral 09/10/2020    Excessive ear wax, bilateral    Low back pain, unspecified 01/11/2022    Acute lumbar back pain    Muscle spasm of back 01/11/2022    Muscle spasm of back    Other conditions influencing health status     Pneumonia    Other enthesopathies, not elsewhere classified 03/09/2015    Tendonitis of shoulder    Other enthesopathy of right foot and ankle 12/07/2021    Tendinitis of right foot    Other fecal abnormalities 03/09/2015    Heme positive stool    Other muscle spasm 09/04/2018    Trapezius muscle spasm    Other prurigo 04/06/2021    Pruritic rash    Other skin changes     Skin irritation    Other skin changes due to chronic exposure to nonionizing radiation 03/29/2016    Sun-damaged skin    Other specified symptoms and signs involving the circulatory and respiratory systems 11/10/2022    Rales    Pain in left knee 09/03/2021    Knee pain, left     Pain in right knee 12/01/2020    Pain and swelling of right knee    Palpitations     Heart palpitations    Palpitations 01/31/2023    Personal history of colonic polyps     History of colonic polyps    Personal history of diseases of the blood and blood-forming organs and certain disorders involving the immune mechanism 11/20/2013    History of anemia    Personal history of diseases of the skin and subcutaneous tissue     History of dermatitis    Personal history of diseases of the skin and subcutaneous tissue 10/28/2021    History of skin pruritus    Personal history of other diseases of the circulatory system     History of mitral valve disorder    Personal history of other diseases of the digestive system     History of gastroesophageal reflux (GERD)    Personal history of other diseases of the digestive system 02/14/2019    History of esophageal stricture    Personal history of other diseases of the respiratory system 06/07/2019    History of paranasal sinus congestion    Personal history of other diseases of the respiratory system 11/10/2022    History of pleural effusion    Personal history of other drug therapy 05/21/2018    History of pneumococcal vaccination    Personal history of other drug therapy 10/14/2016    History of influenza vaccination    Personal history of other endocrine, nutritional and metabolic disease     History of high cholesterol    Personal history of other endocrine, nutritional and metabolic disease 08/22/2019    History of iron deficiency    Personal history of other medical treatment     History of nuclear stress test    Personal history of other specified conditions 09/04/2018    History of chest pain    Personal history of other specified conditions 06/08/2021    History of headache    Personal history of other specified conditions 05/14/2019    History of chronic cough    Personal history of other specified conditions 02/12/2019    History of dysphagia    Personal history of  other specified conditions 10/08/2015    History of hoarseness    Personal history of other specified conditions 11/10/2022    History of wheezing    Personal history of pneumonia (recurrent) 11/10/2022    History of pneumonia    Personal history of pneumonia (recurrent)     History of pneumonia    Pleural effusion 03/14/2023    Pneumonia 03/14/2023    Pruritus ani 08/23/2016    Perianal irritation    Pyuria 08/22/2019    Pyuria    Rales 03/14/2023    Superficial mycosis, unspecified 10/08/2015    Fungal dermatitis    Unspecified abdominal hernia without obstruction or gangrene     Hernia    Ventricular premature depolarization 10/19/2021    PVC (premature ventricular contraction)    Wheezing 03/14/2023    Zoster without complications 08/10/2018    Herpes zoster without complication       Past Surgical History:   Procedure Laterality Date    COLONOSCOPY  04/26/2012    Colonoscopy (Fiberoptic)    ESOPHAGOGASTRODUODENOSCOPY  01/08/2015    Diagnostic Esophagogastroduodenoscopy    HERNIA REPAIR  06/29/2015    Hernia Repair    HERNIA REPAIR  04/26/2012    Inguinal Hernia Repair    OTHER SURGICAL HISTORY  02/12/2019    Myringotomy    OTHER SURGICAL HISTORY  01/08/2015    Repair Of Esophageal Stricture    OTHER SURGICAL HISTORY  12/07/2021    Skin lesion excision    OTHER SURGICAL HISTORY  12/09/2021    Mohs surgery    OTHER SURGICAL HISTORY  04/26/2012    Evaluation Of Swallowing And Oral Function    OTHER SURGICAL HISTORY  04/26/2012    Chest Tube Insertion         Current Outpatient Medications on File Prior to Visit   Medication Sig Dispense Refill    aspirin 81 mg EC tablet Take 1 tablet (81 mg) by mouth once daily. with food usually in the am      atenolol (Tenormin) 25 mg tablet TAKE 1 TABLET BY MOUTH ONCE  DAILY 30 tablet 11    atorvastatin (Lipitor) 40 mg tablet TAKE 1 TABLET BY MOUTH ONCE  DAILY AT BEDTIME 90 tablet 3    CALCIUM CITRATE-VITAMIN D3 ORAL Take 1 tablet by mouth 1 (one) time each day.      cetirizine  (ZYRTEC) 10 mg capsule Take by mouth.      cyanocobalamin, vitamin B-12, 1,000 mcg capsule Take 1 capsule by mouth 5 (five) times a week.      famotidine (Pepcid) 40 mg tablet Take by mouth. 1 tab every evening per dr mooney:9/2021 gi trying to wean him from this to famotidine but so far gets hb back after 2 days of famo and has to take ppi 3rd night      FLUoxetine (PROzac) 10 mg capsule Take 1 capsule (10 mg) by mouth once daily. with 40mg to equal 50mg daily 90 capsule 3    FLUoxetine (PROzac) 40 mg capsule TAKE 1 CAPSULE BY MOUTH ONCE  DAILY WITH 10 MG TO MAKE 50 MG 90 capsule 0    fluticasone (Flonase) 50 mcg/actuation nasal spray Administer 2 sprays into each nostril 2 times a day. 64 g 3    lansoprazole (Prevacid) 15 mg DR capsule Take one cap daily  30 minutes ac supper or breakfast during March 2024, then one cap daily prn breakthrough heartburn. 30 capsule 2    omega-3 acid ethyl esters (Lovaza) 1 gram capsule Take 4 capsules (4 g) by mouth once daily. 360 capsule 3    psyllium husk-calcium (Metamucil Plus Calcium) 1-60 gram-mg capsule Take 5 capsules by mouth 1 (one) time each day.      tamsulosin (Flomax) 0.4 mg 24 hr capsule Take 1 capsule (0.4 mg) by mouth once daily.      traZODone (Desyrel) 50 mg tablet Take 1 tablet (50 mg) by mouth once daily at bedtime. Patient takes 1/2 14 tablet 0    traZODone (Desyrel) 50 mg tablet Take 1 tablet (50 mg) by mouth as needed at bedtime for sleep. 90 tablet 2    triamcinolone (Kenalog) 0.1 % ointment Apply topically 2 times a day. (Patient taking differently: Apply 1 Application topically 2 times a day as needed for rash.) 454 g 3     No current facility-administered medications on file prior to visit.        Allergies   Allergen Reactions    Nirmatrelvir-Ritonavir Nausea/vomiting        Review of Systems  A detailed 12 point ROS was performed and is negative except as noted in the intake form, HPI and/or Past Medical History        Physical Exam   CONSTITUTIONAL:  Well developed, well nourished.  VOICE: Normal voice quality  RESPIRATION: Breathing comfortably, no stridor.  CV: No clubbing/cyanosis/edema in hands.  EYES: EOM Intact, sclera normal.  NEURO: Alert and oriented times 3, Cranial nerves V,VII intact and symmetric bilaterally.  HEAD AND FACE: Symmetric facial features, no masses or lesions, sinuses nontender to palpation.  SALIVARY GLANDS: Parotid and submandibular glands normal bilaterally.  + EARS: Normal external ears  Right EAC with cerumen obstruction (removed)  Right EAC patent, tympanic membrane intact  Left EAC patent, tympanic membrane intact  NOSE: External nose midline, anterior rhinoscopy is normal with limited visualization to the anterior aspect of the interior turbinates. No lesions noted.  ORAL CAVITY/OROPHARYNX/LIPS: Normal mucous membranes, normal floor of mouth/tongue/OP, no masses or lesions are noted.  PHARYNGEAL WALLS AND NASOPHARYNX: No masses noted. Mucosa appears clean and moist  NECK/LYMPH: No LAD, no thyroid masses. Trachea palpably midline  SKIN: Neck skin is without injury  PSYCH: Alert and oriented with appropriate mood and affect     Procedure: Removal of impacted cerumen, right   Indication: Cerumen impaction.   The procedure was reviewed and explained.  Verbal consent was obtained.  With the use of the microscope and speculum the right ear was examined, cerumen was cleaned with the use of curettes. The patient tolerated the procedure well.       Results:   Audiogram performed today reviewed  Right: Normal hearing up to 2000 Hz sloping to moderate sensorineural hearing loss.  Speech discrimination score 100%.  Type a tympanogram.  Left: Normal hearing up to 2000 Hz sloping to moderate sensorineural hearing loss at 4000 Hz improving to normal hearing at 8000 Hz.  Speech discrimination score 100%.  Type a tympanogram.

## 2024-06-28 NOTE — PROGRESS NOTES
Subjective   Patient ID: Elpidio Jamil is a 74 y.o. male who presents for [facility] wellness visit and annual physical and follow up on conditions.    LAST VISIT PLAN FROM: 02/26/2024    Problem List as of 7/2/2024 Reviewed: 2/26/2024  3:05 PM by Katiuska Helm MD      Abnormal chest xray    Acne    Acquired elongated uvula    Agatston coronary artery calcium score between 200 and 399    Allergic rhinitis    Anxiety disorder    Arthritis    Benign prostatic hyperplasia (BPH) with urinary urgency    Calcification of coronary artery    Chronic pansinusitis    Depression, major, single episode, mild (CMS-HCC)    Eczema of hand    Elevated hemoglobin A1c    Elevated triglycerides with high cholesterol    Environmental and seasonal allergies    Esophageal reflux    Eustachian tube dysfunction, bilateral    Excessive ear wax, bilateral    Cerumen impaction    Hearing loss    IFG (impaired fasting glucose)    Hypercholesterolemia    Insomnia    Low vitamin B12 level    Neuritis of left ulnar nerve    IVONNE on CPAP    Last Assessment & Plan 10/11/2023 Telemedicine Written 10/11/2023  1:38 PM by Refugio Ying MD     PSG done showed overall AHI 10, REM AHI 27, with some degree of sleep-related hypoxemia  On CPAP, using  s/p Respironics recall replacement device  ANABELLE Garcia  Doing well, symptomatically benefiting from CPAP use         Other microscopic hematuria    Periodic limb movements of sleep    Pre-diabetes    Psoriasis    Sensorineural hearing loss (SNHL) of both ears    Asymmetric SNHL (sensorineural hearing loss)    Sleep-related hypoxia    Urinary frequency    History of iron deficiency    PVC (premature ventricular contraction)    Viral URI with cough    Overweight with body mass index (BMI) of 27 to 27.9 in adult    Overweight with body mass index (BMI) of 28 to 28.9 in adult    Bursitis of right shoulder    History of esophageal stricture    Right shoulder pain     Assessment/Plan   Problem List Items Addressed This  Visit       Allergic rhinitis    Relevant Medications    fluticasone (Flonase) 50 mcg/actuation nasal spray    Anxiety disorder    Relevant Medications    FLUoxetine (PROzac) 10 mg capsule    Depression, major, single episode, mild (CMS/HCC)    Relevant Medications    FLUoxetine (PROzac) 10 mg capsule    Esophageal reflux    Relevant Medications    lansoprazole (Prevacid) 15 mg DR capsule    Pre-diabetes    Relevant Orders    POCT glycosylated hemoglobin (Hb A1C) manually resulted (Completed)     Other Visit Diagnoses       Benign prostatic hyperplasia with lower urinary tract symptoms, symptom details unspecified    -  Primary    Relevant Orders    Referral to Urology   Do not recline for 3 hours after eating.  Head of bed 4 inches elevation.  Pepcid continue at bedtime regularly.  ADD TEMPORARILY: lansoprazole 15 mg one per day , take 30 minutes before supper or before breakfast,  for 30 days. If you have recurrent reflux or feeling like something is sticking despite this, then call Dr Dove for an appt. If the medication helps, but the problem recurs after stopping the lansoprazole, call Dr Dove.  Re cholesterol blood test, will plan to do in the fall with your other annual labs.  Follow up in 4 months : wellness visit and annual check up.  No labs due before that visit, will do labs in the fall.  END INFO FROM PRIOR VISIT    HPI  Here for w visit annual pe and follow up  Health maintenance items last completed:  Colonoscopy: November 2022, dr dove, several tubular adenomas, a sessile serrated adenoma, shorter follow up -3 years.  PSA: 09/21/2023 was 0.23.  Urine ACR (albumin-creatinine ratio) or microalbumin: if HTN or DM2: Albumin 4.4 on 10/05/2023.  Vaccines due or not completed: UTD except for RSV and Covid vaccine-due in the Fall of 2024.  ECG 05/29/2024 Cardiology, Est ok 2021.  Last Health Maintenance exam: 07/10/2023    Scribe:  Patient is a 74-year-old male who presents today for Annual  Wellness Visit follow up with annual physical.    He reports an ER visit 05/29/2024 due to suspected PVCs.  Of note, he has had PVCs in the past and is aware of the symptoms.  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  He was prescribed Zofran for nausea.  Workup revealed 1 PVC but no other significant findings.  He has not had any recurring episodes since that time.      Intermittent light headedness / dizziness / fatigue:  He reports intermittent light headedness with mild fatigue.  Recommended taking tamsulosin and Zyrtec at night, Metamucil in the morning with other morning medications at least 1 hour later for better absorption.  We discussed his medication regimen and made changes to try to alleviate dizziness/ lightheadedness with fatigue.         I reviewed the questions and answers of the Medicare Wellness Visit form.  I discussed code status with the patient, and they understand the difference between full code and DNR.  He is full code.  I discussed HCPOA and LW.     He just had a hearing test which had slightly changed.  He had right ear cerumen removal with follow up in 6 months.  I will not do an ear exam today.    We reviewed and updated his Care Team list.    We discussed vaccines which are UTD until Fall 2024.  Advised to get influenza and Covid vaccine with recommendation for RSV vaccine in Fall of 2024, especially if coming into contact with infants.    Pre-diabetes:  His A1c today was 5.7%.    BPH:  He follows with Urology yearly.  Initially, he followed with Dr. Tobias, last seen 09/2023, and follows now with Dr. Saunders.  Urinary frequency and urgency remains unchanged since last visit.  Recommended taking 2 years of records from Urology to his next appointment with Dr. Suanders.    BP slightly low today at 92/54 with recheck at 106/70.  Last visit was 102/62.  We discussed that tamsulosin  can lower BP. We discussed possibly decreasing his dose but will try increasing his salt intake prior.  On note , he tries to avoid salt.  He denies palpitations, currently.      Cough:  Intermittent.  CT chest post Covid in 2022 showed scarring in right lower lobe.  He reports his cough ongoing for more than a year.  We discussed repeating the CT chest.  Order placed today.  Of note, he had esophageal tearing with possible empyema when younger.  He states coughing everyday, no wheeze, no SOB, and does not wake up in the middle of the night coughing.  He correlates his cough with post nasal drip.  Since he does not have any wheezing or SOB, I do not think he needs PFT.  He denies fever or chills.    Repeat fasting blood work ordered prior to follow up visit in October.    Anxiety:  He reports some anxiety but does not want to change any of his medication.  He continues on Prozac 50 mg daily along with trazodone 50 mg at bedtime.    Social History:  He recently had his 50th wedding anniversary with a reception at [employer] with family.    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    Review of Systems  All systems reviewed and are negative unless otherwise stated in HPI or noted in writing on the written   3  page history and review of systems document reviewed by me and  scanned in with this visit. This is scanned either to notes or to media, with today's date.     Current Outpatient Medications   Medication Instructions    aspirin 81 mg, oral, Daily, with food usually in the am    atenolol (TENORMIN) 25 mg, oral, Daily    atorvastatin (LIPITOR) 40 mg, oral, Nightly    CALCIUM CITRATE-VITAMIN D3 ORAL 1 tablet, oral, Daily    cetirizine (ZYRTEC) 10 mg, oral, Nightly    cyanocobalamin, vitamin B-12, 1,000 mcg capsule 1 capsule, oral, 5 times weekly    famotidine (Pepcid) 40 mg tablet oral, 1 tab every evening per dr mooney:9/2021 gi trying to wean him from this to famotidine  "but so far gets hb back after 2 days of famo and has to take ppi 3rd night    FLUoxetine (PROzac) 40 mg capsule TAKE 1 CAPSULE BY MOUTH ONCE  DAILY WITH 10 MG TO MAKE 50 MG    FLUoxetine (PROZAC) 10 mg, oral, Daily, with 40mg to equal 50mg daily    fluticasone (Flonase) 50 mcg/actuation nasal spray 2 sprays, Each Nostril, 2 times daily    lansoprazole (Prevacid) 15 mg DR capsule Take one cap daily  30 minutes ac supper or breakfast during March 2024, then one cap daily prn breakthrough heartburn.    meloxicam (MOBIC) 15 mg, oral, Daily (0630)    omega-3 acid ethyl esters (LOVAZA) 3 g, oral, Daily, Take one cap in the am. And 2 caps in the pm.    psyllium husk-calcium (Metamucil Plus Calcium) 1-60 gram-mg capsule 5 capsules, oral, Daily    tamsulosin (FLOMAX) 0.4 mg, oral, Daily, In the evening    traZODone (DESYREL) 50 mg, oral, Nightly, Patient takes 1/2    triamcinolone (Kenalog) 0.1 % ointment Topical, 2 times daily     Allergies   Allergen Reactions    Nirmatrelvir-Ritonavir Nausea/vomiting     AKA.. Paxlovid     Objective     Lab Results   Component Value Date    WBC 8.1 10/05/2023    HGB 14.3 10/05/2023    HCT 43.5 10/05/2023     10/05/2023    CHOL 132 03/09/2023    TRIG 174 (H) 03/09/2023    HDL 36.0 (A) 03/09/2023    ALT 24 10/05/2023    AST 16 10/05/2023     10/05/2023    K 4.3 10/05/2023     10/05/2023    CREATININE 0.76 10/05/2023    BUN 17 10/05/2023    CO2 31 10/05/2023    TSH 1.45 03/09/2023    PSA 0.23 09/21/2023    GLUF 103 (H) 11/23/2021    HGBA1C 5.7 07/02/2024     Physical Exam  /70   Pulse 60   Resp 18   Ht 1.778 m (5' 10\")   Wt 88.5 kg (195 lb)   BMI 27.98 kg/m²         10/11/2023     7:24 AM 10/18/2023     3:08 PM 10/31/2023     1:39 PM 2/26/2024     1:49 PM 6/17/2024     1:28 PM 7/2/2024     2:05 PM 7/2/2024     3:25 PM   Vitals   Systolic 120 100 116 102  92 106   Diastolic 78 52 77 62  54 70   Heart Rate 65 68 101   60    Resp 18 16  18  18    Height (in) 1.778 " "m (5' 10\") 1.778 m (5' 10\") 1.778 m (5' 10\") 1.765 m (5' 9.5\") 1.778 m (5' 10\") 1.778 m (5' 10\")    Weight (lb) 193 189 192.8 198 195 195    BMI 27.69 kg/m2 27.12 kg/m2 27.66 kg/m2 28.82 kg/m2 27.98 kg/m2 27.98 kg/m2    BSA (m2) 2.08 m2 2.06 m2 2.08 m2 2.1 m2 2.09 m2 2.09 m2    Visit Report Report Report Report Report Report Report Report        General: Patient is known to me, looks well, is in usual state of health, with  no obvious distress.  Head: normocephalic  Eyes: PERRL, EOMI, no scleral icterus or conjunctival abnormality  Oropharynx: Well hydrated mucosa. no lesions, erythema. palate moves well.  Neck: No masses, no cervical (A/P/ or Lateral) adenopathy. Thyroid not enlarged and no nodules. Carotid pulses normal and no bruits.  Chest: Normal AP diameter. No supraclavicular adenopathy.  Lungs: Clear to auscultation: no rales , wheezes, rhonchi, rubs, examined bilaterally, ant/post /lateral. RR normal.  Heart: RRR. No MGCR S1 S2 normal.  Abd: BS normal, no bruits. No hepatosplenomegaly. No abdominal mass or tenderness. No distension.  Extremities: No upper extremity edema. No lower leg edema.No ischemia.   Joints: no synovitis seen. No deformities.  Pulses: normal posterior tibialis pulses, normal dorsalis pedis pulses. normal radial pulses. Normal femoral pulses without bruits.  Neuro: CN 2-12 intact . Alert, oriented, appropriate.  No focal weakness observed. No tremors. Ambulation is normal .  Psych: Mood and affect normal. No cognitive deficits noted during this exam. Alert, oriented, appropriate.  Skin: No rash, petechiae or excessive bruising.  Lymph: no SC axillary or inguinal adenopathy       Assessment/Plan   Problem List Items Addressed This Visit       Anxiety disorder    Relevant Medications    FLUoxetine (PROzac) 40 mg capsule    FLUoxetine (PROzac) 10 mg capsule    Benign prostatic hyperplasia (BPH) with urinary urgency    Relevant Orders    Prostate Specific Antigen    Depression, major, " single episode, mild (CMS-HCC)    Relevant Medications    FLUoxetine (PROzac) 10 mg capsule    Elevated triglycerides with high cholesterol    Relevant Medications    omega-3 acid ethyl esters (Lovaza) 1 gram capsule    Other Relevant Orders    Lipid Panel    TSH with reflex to Free T4 if abnormal    Esophageal reflux    Relevant Orders    CBC    Hypercholesterolemia    Relevant Orders    Cholesterol, LDL Direct    Low vitamin B12 level    Relevant Orders    Vitamin B12    Pre-diabetes    Relevant Orders    POCT glycosylated hemoglobin (Hb A1C) manually resulted (Completed)     Other Visit Diagnoses       Annual visit for general adult medical examination with abnormal findings    -  Primary    Benign essential hypertension        Relevant Orders    CBC    Comprehensive Metabolic Panel    Magnesium    Uric Acid    Albumin , Urine Random    Encounter for monitoring statin therapy        Relevant Orders    Comprehensive Metabolic Panel    CK    Chronic cough        Relevant Orders    CT chest wo IV contrast        Annual  history and physical completed including  ROS, PE.   Medicare wellness visit  completed including:  Immunizations,   Health Maintenance items,  Discussion of advanced directives and code status, which is: full code  Fall risk and prevention addressed.  Functionality and pain were assessed.   Cancer screening testing was addressed as appropriate-see patient discussion/orders.   Medications were discussed and reviewed/reconciled and refill need assessed/handled.   Patient states taking their medication regularly and appropriately.  Also:   Depression screening PhQ-2 completed: neg on his meds his mood is ok, needs a refill  Alcohol misuse screen: neg    In addition to the wellness visit and screening, the above medical issues were addressed:  As well as results of testing completed since last visit.  He is not a high fall risk, he did have one fall but not a high risk.    See patient instructions in  wrap up plan, orders and comments for treatment plan.  Patient Instructions:  Schedule CT scan of the chest at your convenience.    Can be a little more liberal with salt use for lower bp.  Keep atenolol at 25 mg daily to control palpitations/histor of pvc's.   Discussion:If salt causes and issue or if bp stays low can consider cutting back to 1/2 tab of atenolol.    Flu shot, RSV shots covid update this fall.    Fasting blood test before next visit, approx October.  Instructions for obtaining lab tests:   You do not need a paper requisition when obtaining tests at a  facility. No appointment is needed for lab tests.  For fasting blood tests:  Please do not consume calories for 10-12 hours prior to this blood test. It is ok to drink water, you should be hydrated.  Please do not take vitamins, supplements or thyroid medication before your blood is drawn this day.   Most lab results should be available for your review on the  My Chart portal within 48 hours. Please contact the office if you have not received results within 2 weeks of obtaining the test.    Take 2 years of records from urology to Dr Chip tan.     Nighttime:  Zyrtec, tamsulosin, trazodone, atorvastatin, famotidine    Am : atenolol  Fluoxetine    Lovaza one am and 2 pm but not right at bedtime as it could cause heartburn if taking it right before bedtime. Or you can taek all 3 at once. We reduced it from 4 per day due to heartburn recurrence.    Metamucil is fine in the am but wait an hour before taking other medications.    Follow up in October           Scribe Attestation  By signing my name below, Tess CARROLL Scribe   attest that this documentation has been prepared under the direction and in the presence of Katiuska Helm MD.

## 2024-07-02 ENCOUNTER — APPOINTMENT (OUTPATIENT)
Dept: PRIMARY CARE | Facility: CLINIC | Age: 74
End: 2024-07-02
Payer: MEDICARE

## 2024-07-02 VITALS
WEIGHT: 195 LBS | RESPIRATION RATE: 18 BRPM | SYSTOLIC BLOOD PRESSURE: 106 MMHG | HEART RATE: 60 BPM | BODY MASS INDEX: 27.92 KG/M2 | HEIGHT: 70 IN | DIASTOLIC BLOOD PRESSURE: 70 MMHG

## 2024-07-02 DIAGNOSIS — N40.1 BENIGN PROSTATIC HYPERPLASIA (BPH) WITH URINARY URGENCY: ICD-10-CM

## 2024-07-02 DIAGNOSIS — Z00.00 ENCOUNTER FOR SUBSEQUENT ANNUAL WELLNESS VISIT (AWV) IN MEDICARE PATIENT: ICD-10-CM

## 2024-07-02 DIAGNOSIS — R73.03 PRE-DIABETES: ICD-10-CM

## 2024-07-02 DIAGNOSIS — R05.3 CHRONIC COUGH: ICD-10-CM

## 2024-07-02 DIAGNOSIS — Z78.9 FULL CODE STATUS: ICD-10-CM

## 2024-07-02 DIAGNOSIS — Z51.81 ENCOUNTER FOR MONITORING STATIN THERAPY: ICD-10-CM

## 2024-07-02 DIAGNOSIS — Z00.01 ANNUAL VISIT FOR GENERAL ADULT MEDICAL EXAMINATION WITH ABNORMAL FINDINGS: Primary | ICD-10-CM

## 2024-07-02 DIAGNOSIS — R79.89 LOW VITAMIN B12 LEVEL: ICD-10-CM

## 2024-07-02 DIAGNOSIS — R39.15 BENIGN PROSTATIC HYPERPLASIA (BPH) WITH URINARY URGENCY: ICD-10-CM

## 2024-07-02 DIAGNOSIS — Z79.899 ENCOUNTER FOR MONITORING STATIN THERAPY: ICD-10-CM

## 2024-07-02 DIAGNOSIS — F32.0 DEPRESSION, MAJOR, SINGLE EPISODE, MILD (CMS-HCC): ICD-10-CM

## 2024-07-02 DIAGNOSIS — I10 BENIGN ESSENTIAL HYPERTENSION: ICD-10-CM

## 2024-07-02 DIAGNOSIS — E78.00 HYPERCHOLESTEROLEMIA: ICD-10-CM

## 2024-07-02 DIAGNOSIS — F41.9 ANXIETY DISORDER, UNSPECIFIED TYPE: ICD-10-CM

## 2024-07-02 DIAGNOSIS — K21.9 GASTROESOPHAGEAL REFLUX DISEASE, UNSPECIFIED WHETHER ESOPHAGITIS PRESENT: ICD-10-CM

## 2024-07-02 DIAGNOSIS — E78.2 ELEVATED TRIGLYCERIDES WITH HIGH CHOLESTEROL: ICD-10-CM

## 2024-07-02 LAB — POC HEMOGLOBIN A1C: 5.7 % (ref 4.2–6.5)

## 2024-07-02 PROCEDURE — 1159F MED LIST DOCD IN RCRD: CPT | Performed by: INTERNAL MEDICINE

## 2024-07-02 PROCEDURE — 1126F AMNT PAIN NOTED NONE PRSNT: CPT | Performed by: INTERNAL MEDICINE

## 2024-07-02 PROCEDURE — 1158F ADVNC CARE PLAN TLK DOCD: CPT | Performed by: INTERNAL MEDICINE

## 2024-07-02 PROCEDURE — 1170F FXNL STATUS ASSESSED: CPT | Performed by: INTERNAL MEDICINE

## 2024-07-02 PROCEDURE — 3078F DIAST BP <80 MM HG: CPT | Performed by: INTERNAL MEDICINE

## 2024-07-02 PROCEDURE — 1160F RVW MEDS BY RX/DR IN RCRD: CPT | Performed by: INTERNAL MEDICINE

## 2024-07-02 PROCEDURE — G0439 PPPS, SUBSEQ VISIT: HCPCS | Performed by: INTERNAL MEDICINE

## 2024-07-02 PROCEDURE — 1157F ADVNC CARE PLAN IN RCRD: CPT | Performed by: INTERNAL MEDICINE

## 2024-07-02 PROCEDURE — 3074F SYST BP LT 130 MM HG: CPT | Performed by: INTERNAL MEDICINE

## 2024-07-02 PROCEDURE — 83036 HEMOGLOBIN GLYCOSYLATED A1C: CPT | Performed by: INTERNAL MEDICINE

## 2024-07-02 PROCEDURE — 99397 PER PM REEVAL EST PAT 65+ YR: CPT | Performed by: INTERNAL MEDICINE

## 2024-07-02 PROCEDURE — 1123F ACP DISCUSS/DSCN MKR DOCD: CPT | Performed by: INTERNAL MEDICINE

## 2024-07-02 RX ORDER — OMEGA-3-ACID ETHYL ESTERS 1 G/1
3 CAPSULE, LIQUID FILLED ORAL DAILY
Status: SHIPPED
Start: 2024-07-02

## 2024-07-02 RX ORDER — FLUOXETINE 10 MG/1
10 CAPSULE ORAL DAILY
Qty: 90 CAPSULE | Refills: 3 | Status: SHIPPED | OUTPATIENT
Start: 2024-07-02 | End: 2025-07-02

## 2024-07-02 RX ORDER — TAMSULOSIN HYDROCHLORIDE 0.4 MG/1
0.4 CAPSULE ORAL DAILY
Status: SHIPPED
Start: 2024-07-02

## 2024-07-02 RX ORDER — FLUOXETINE HYDROCHLORIDE 40 MG/1
CAPSULE ORAL
Qty: 90 CAPSULE | Refills: 3 | Status: SHIPPED | OUTPATIENT
Start: 2024-07-02

## 2024-07-02 SDOH — ECONOMIC STABILITY: INCOME INSECURITY: IN THE LAST 12 MONTHS, WAS THERE A TIME WHEN YOU WERE NOT ABLE TO PAY THE MORTGAGE OR RENT ON TIME?: NO

## 2024-07-02 SDOH — HEALTH STABILITY: PHYSICAL HEALTH: ON AVERAGE, HOW MANY DAYS PER WEEK DO YOU ENGAGE IN MODERATE TO STRENUOUS EXERCISE (LIKE A BRISK WALK)?: 5 DAYS

## 2024-07-02 SDOH — ECONOMIC STABILITY: TRANSPORTATION INSECURITY
IN THE PAST 12 MONTHS, HAS THE LACK OF TRANSPORTATION KEPT YOU FROM MEDICAL APPOINTMENTS OR FROM GETTING MEDICATIONS?: NO

## 2024-07-02 SDOH — ECONOMIC STABILITY: FOOD INSECURITY: WITHIN THE PAST 12 MONTHS, THE FOOD YOU BOUGHT JUST DIDN'T LAST AND YOU DIDN'T HAVE MONEY TO GET MORE.: NEVER TRUE

## 2024-07-02 SDOH — HEALTH STABILITY: PHYSICAL HEALTH: ON AVERAGE, HOW MANY MINUTES DO YOU ENGAGE IN EXERCISE AT THIS LEVEL?: 30 MIN

## 2024-07-02 SDOH — ECONOMIC STABILITY: FOOD INSECURITY: WITHIN THE PAST 12 MONTHS, YOU WORRIED THAT YOUR FOOD WOULD RUN OUT BEFORE YOU GOT MONEY TO BUY MORE.: NEVER TRUE

## 2024-07-02 SDOH — ECONOMIC STABILITY: TRANSPORTATION INSECURITY
IN THE PAST 12 MONTHS, HAS LACK OF TRANSPORTATION KEPT YOU FROM MEETINGS, WORK, OR FROM GETTING THINGS NEEDED FOR DAILY LIVING?: NO

## 2024-07-02 ASSESSMENT — ACTIVITIES OF DAILY LIVING (ADL)
TOILETING: INDEPENDENT
FEEDING YOURSELF: INDEPENDENT
DOING_HOUSEWORK: INDEPENDENT
TAKING MEDICATION: INDEPENDENT
GROCERY_SHOPPING: INDEPENDENT
WALKS IN HOME: INDEPENDENT
JUDGMENT_ADEQUATE_SAFELY_COMPLETE_DAILY_ACTIVITIES: YES
MANAGING FINANCES: INDEPENDENT
USING TELEPHONE: INDEPENDENT
USING TRANSPORTATION: INDEPENDENT
EATING: INDEPENDENT
PATIENT'S MEMORY ADEQUATE TO SAFELY COMPLETE DAILY ACTIVITIES?: YES
PREPARING MEALS: INDEPENDENT
MANAGING_FINANCES: INDEPENDENT
STIL DRIVING: YES
NEEDS ASSISTANCE WITH FOOD: INDEPENDENT
DRESSING: INDEPENDENT
HEARING - LEFT EAR: HEARING AID
HEARING - RIGHT EAR: HEARING AID
DRESSING YOURSELF: INDEPENDENT
PILL BOX USED: NO
BATHING: INDEPENDENT
DOING HOUSEWORK: INDEPENDENT
GROOMING: INDEPENDENT
BATHING: INDEPENDENT
ADEQUATE_TO_COMPLETE_ADL: YES
TAKING_MEDICATION: INDEPENDENT
GROCERY SHOPPING: INDEPENDENT

## 2024-07-02 ASSESSMENT — LIFESTYLE VARIABLES
HOW OFTEN DO YOU HAVE A DRINK CONTAINING ALCOHOL: NEVER
SKIP TO QUESTIONS 9-10: 1
HOW MANY STANDARD DRINKS CONTAINING ALCOHOL DO YOU HAVE ON A TYPICAL DAY: PATIENT DOES NOT DRINK
AUDIT-C TOTAL SCORE: 0
HOW OFTEN DO YOU HAVE SIX OR MORE DRINKS ON ONE OCCASION: NEVER

## 2024-07-02 ASSESSMENT — SOCIAL DETERMINANTS OF HEALTH (SDOH)
HOW OFTEN DO YOU ATTEND CHURCH OR RELIGIOUS SERVICES?: MORE THAN 4 TIMES PER YEAR
IN A TYPICAL WEEK, HOW MANY TIMES DO YOU TALK ON THE PHONE WITH FAMILY, FRIENDS, OR NEIGHBORS?: MORE THAN THREE TIMES A WEEK
WITHIN THE LAST YEAR, HAVE YOU BEEN KICKED, HIT, SLAPPED, OR OTHERWISE PHYSICALLY HURT BY YOUR PARTNER OR EX-PARTNER?: NO
DO YOU BELONG TO ANY CLUBS OR ORGANIZATIONS SUCH AS CHURCH GROUPS UNIONS, FRATERNAL OR ATHLETIC GROUPS, OR SCHOOL GROUPS?: YES
HOW HARD IS IT FOR YOU TO PAY FOR THE VERY BASICS LIKE FOOD, HOUSING, MEDICAL CARE, AND HEATING?: NOT HARD AT ALL
IN THE PAST 12 MONTHS, HAS THE ELECTRIC, GAS, OIL, OR WATER COMPANY THREATENED TO SHUT OFF SERVICE IN YOUR HOME?: NO
WITHIN THE LAST YEAR, HAVE YOU BEEN HUMILIATED OR EMOTIONALLY ABUSED IN OTHER WAYS BY YOUR PARTNER OR EX-PARTNER?: NO
WITHIN THE LAST YEAR, HAVE TO BEEN RAPED OR FORCED TO HAVE ANY KIND OF SEXUAL ACTIVITY BY YOUR PARTNER OR EX-PARTNER?: NO
WITHIN THE LAST YEAR, HAVE YOU BEEN AFRAID OF YOUR PARTNER OR EX-PARTNER?: NO
HOW OFTEN DO YOU ATTENT MEETINGS OF THE CLUB OR ORGANIZATION YOU BELONG TO?: MORE THAN 4 TIMES PER YEAR
HOW OFTEN DO YOU GET TOGETHER WITH FRIENDS OR RELATIVES?: MORE THAN THREE TIMES A WEEK

## 2024-07-02 ASSESSMENT — PATIENT HEALTH QUESTIONNAIRE - PHQ9
1. LITTLE INTEREST OR PLEASURE IN DOING THINGS: NOT AT ALL
SUM OF ALL RESPONSES TO PHQ9 QUESTIONS 1 AND 2: 0
2. FEELING DOWN, DEPRESSED OR HOPELESS: NOT AT ALL
2. FEELING DOWN, DEPRESSED OR HOPELESS: NOT AT ALL
SUM OF ALL RESPONSES TO PHQ9 QUESTIONS 1 AND 2: 0
1. LITTLE INTEREST OR PLEASURE IN DOING THINGS: NOT AT ALL
SUM OF ALL RESPONSES TO PHQ9 QUESTIONS 1 & 2: 0
1. LITTLE INTEREST OR PLEASURE IN DOING THINGS: NOT AT ALL
2. FEELING DOWN, DEPRESSED OR HOPELESS: NOT AT ALL

## 2024-07-02 ASSESSMENT — ANXIETY QUESTIONNAIRES
7. FEELING AFRAID AS IF SOMETHING AWFUL MIGHT HAPPEN: SEVERAL DAYS
1. FEELING NERVOUS, ANXIOUS, OR ON EDGE: SEVERAL DAYS
IF YOU CHECKED OFF ANY PROBLEMS ON THIS QUESTIONNAIRE, HOW DIFFICULT HAVE THESE PROBLEMS MADE IT FOR YOU TO DO YOUR WORK, TAKE CARE OF THINGS AT HOME, OR GET ALONG WITH OTHER PEOPLE: NOT DIFFICULT AT ALL
GAD7 TOTAL SCORE: 5
2. NOT BEING ABLE TO STOP OR CONTROL WORRYING: SEVERAL DAYS
6. BECOMING EASILY ANNOYED OR IRRITABLE: NOT AT ALL
5. BEING SO RESTLESS THAT IT IS HARD TO SIT STILL: SEVERAL DAYS
4. TROUBLE RELAXING: NOT AT ALL
3. WORRYING TOO MUCH ABOUT DIFFERENT THINGS: SEVERAL DAYS

## 2024-07-02 ASSESSMENT — COLUMBIA-SUICIDE SEVERITY RATING SCALE - C-SSRS
2. HAVE YOU ACTUALLY HAD ANY THOUGHTS OF KILLING YOURSELF?: NO
6. HAVE YOU EVER DONE ANYTHING, STARTED TO DO ANYTHING, OR PREPARED TO DO ANYTHING TO END YOUR LIFE?: NO
1. IN THE PAST MONTH, HAVE YOU WISHED YOU WERE DEAD OR WISHED YOU COULD GO TO SLEEP AND NOT WAKE UP?: NO

## 2024-07-02 ASSESSMENT — PAIN SCALES - GENERAL: PAINLEVEL: 0-NO PAIN

## 2024-07-02 NOTE — PATIENT INSTRUCTIONS
Schedule CT scan of the chest at your convenience.    Can be a little more liberal with salt use for lower bp.  Keep atenolol at 25 mg daily to control palpitations/histor of pvc's.   Discussion:If salt causes and issue or if bp stays low can consider cutting back to 1/2 tab of atenolol.    Flu shot, RSV shots covid update this fall.    Fasting blood test before next visit, approx October.  Instructions for obtaining lab tests:   You do not need a paper requisition when obtaining tests at a  facility. No appointment is needed for lab tests.  For fasting blood tests:  Please do not consume calories for 10-12 hours prior to this blood test. It is ok to drink water, you should be hydrated.  Please do not take vitamins, supplements or thyroid medication before your blood is drawn this day.   Most lab results should be available for your review on the  My Chart portal within 48 hours. Please contact the office if you have not received results within 2 weeks of obtaining the test.    Take 2 years of records from urology to Dr Chip tan.     Nighttime:  Zyrtec, tamsulosin, trazodone, atorvastatin, famotidine    Am : atenolol  Fluoxetine    Lovaza one am and 2 pm but not right at bedtime as it could cause heartburn if taking it right before bedtime. Or you can taek all 3 at once. We reduced it from 4 per day due to heartburn recurrence.    Metamucil is fine in the am but wait an hour before taking other medications.    Follow up in October

## 2024-07-31 ENCOUNTER — CLINICAL SUPPORT (OUTPATIENT)
Dept: AUDIOLOGY | Facility: CLINIC | Age: 74
End: 2024-07-31

## 2024-07-31 DIAGNOSIS — H90.3 SENSORINEURAL HEARING LOSS (SNHL) OF BOTH EARS: Primary | ICD-10-CM

## 2024-07-31 PROCEDURE — V5014 HEARING AID REPAIR/MODIFYING: HCPCS | Performed by: AUDIOLOGIST

## 2024-07-31 NOTE — PROGRESS NOTES
HEARING AID DROP OFF    RIGHT: Phonak Audeo M70-R SN: [id number]  : 2 x M  Wax Guard/Dome: Cerushield, Large open  LEFT: Phonak Audeo M70-R SN: [id number]  : 2 x M  Wax Guard/Dome: Cerushield, Large open    Repair Warranty: out of warranty 8/15/2023  L&D Warranty: out of warranty 8/15/2023    Elpidio Jamil's left hearing aid was dropped off due to non working function.  Visual inspection revealed broken .   was replaced.  Unable to perform listening check as device was dead/not charged.  Device returned to patient's wife.    $100  replacement (, H90.3)    Lissy Oliveros, CCC-A

## 2024-08-01 ENCOUNTER — OFFICE VISIT (OUTPATIENT)
Dept: UROLOGY | Facility: HOSPITAL | Age: 74
End: 2024-08-01
Payer: MEDICARE

## 2024-08-01 DIAGNOSIS — N13.8 BENIGN PROSTATIC HYPERPLASIA WITH URINARY OBSTRUCTION: Primary | ICD-10-CM

## 2024-08-01 DIAGNOSIS — R35.0 URINARY FREQUENCY: ICD-10-CM

## 2024-08-01 DIAGNOSIS — N40.1 BENIGN PROSTATIC HYPERPLASIA WITH URINARY OBSTRUCTION: Primary | ICD-10-CM

## 2024-08-01 PROCEDURE — 1159F MED LIST DOCD IN RCRD: CPT | Performed by: UROLOGY

## 2024-08-01 PROCEDURE — 51798 US URINE CAPACITY MEASURE: CPT | Performed by: UROLOGY

## 2024-08-01 PROCEDURE — 99214 OFFICE O/P EST MOD 30 MIN: CPT | Performed by: UROLOGY

## 2024-08-01 PROCEDURE — 1157F ADVNC CARE PLAN IN RCRD: CPT | Performed by: UROLOGY

## 2024-08-01 PROCEDURE — 99204 OFFICE O/P NEW MOD 45 MIN: CPT | Performed by: UROLOGY

## 2024-08-01 RX ORDER — SOLIFENACIN SUCCINATE 5 MG/1
5 TABLET, FILM COATED ORAL DAILY
Qty: 90 TABLET | Refills: 3 | Status: SHIPPED | OUTPATIENT
Start: 2024-08-01 | End: 2025-08-01

## 2024-08-01 NOTE — PROGRESS NOTES
HPI    74 y.o. male being seen with the following problem list:    Problem list:  BPH with frequency    08/01/24 - Patient kindly referred by Dr. Katiuska Helm. Patient is on tamsulosin. Patient has been under the care of Dr. Elpidio Tobias who has moved to [place]. Patient states his biggest issue is frequency worsened by coffee. He can manage frequency by cutting back on coffee. He did not see a difference on tamsulosin. He has had a history of UTI and hematuria. Patient states he no longer gets erections.       LUTS  Urinary symptoms include: urgency, frequency, hematuria, UTIs, and unhappy with his urinary symptoms    Erections: not working well, naive to meds; both attaining and maintaining erection      Lab Results   Component Value Date    PSA 0.23 09/21/2023    PSA 0.21 08/30/2022    PSA 0.21 09/23/2021    PSA 0.17 08/05/2021    PSA 0.23 07/21/2020         Current Medications:  Current Outpatient Medications   Medication Sig Dispense Refill    aspirin 81 mg EC tablet Take 1 tablet (81 mg) by mouth once daily. with food usually in the am      atenolol (Tenormin) 25 mg tablet TAKE 1 TABLET BY MOUTH ONCE  DAILY 30 tablet 11    atorvastatin (Lipitor) 40 mg tablet TAKE 1 TABLET BY MOUTH ONCE  DAILY AT BEDTIME 90 tablet 3    CALCIUM CITRATE-VITAMIN D3 ORAL Take 1 tablet by mouth 1 (one) time each day.      cetirizine (ZYRTEC) 10 mg capsule Take 1 capsule (10 mg) by mouth once daily at bedtime. 90 capsule 3    cyanocobalamin, vitamin B-12, 1,000 mcg capsule Take 1 capsule by mouth 5 (five) times a week.      famotidine (Pepcid) 40 mg tablet Take by mouth. 1 tab every evening per dr mooney:9/2021 gi trying to wean him from this to famotidine but so far gets hb back after 2 days of famo and has to take ppi 3rd night      FLUoxetine (PROzac) 10 mg capsule Take 1 capsule (10 mg) by mouth once daily. with 40mg to equal 50mg daily 90 capsule 3    FLUoxetine (PROzac) 40 mg capsule TAKE 1 CAPSULE BY MOUTH ONCE  DAILY WITH 10  MG TO MAKE 50 MG 90 capsule 3    fluticasone (Flonase) 50 mcg/actuation nasal spray Administer 2 sprays into each nostril 2 times a day. 64 g 3    lansoprazole (Prevacid) 15 mg DR capsule Take one cap daily  30 minutes ac supper or breakfast during March 2024, then one cap daily prn breakthrough heartburn. 30 capsule 2    meloxicam (Mobic) 15 mg tablet Take 1 tablet (15 mg) by mouth early in the morning..      omega-3 acid ethyl esters (Lovaza) 1 gram capsule Take 3 capsules (3 g) by mouth once daily. Take one cap in the am. And 2 caps in the pm.      psyllium husk-calcium (Metamucil Plus Calcium) 1-60 gram-mg capsule Take 5 capsules by mouth 1 (one) time each day.      tamsulosin (Flomax) 0.4 mg 24 hr capsule Take 1 capsule (0.4 mg) by mouth once daily. In the evening      traZODone (Desyrel) 50 mg tablet Take 1 tablet (50 mg) by mouth once daily at bedtime. Patient takes 1/2 14 tablet 0    triamcinolone (Kenalog) 0.1 % ointment Apply topically 2 times a day. (Patient taking differently: Apply 1 Application topically 2 times a day as needed for rash.) 454 g 3     No current facility-administered medications for this visit.        Active Problems:  Elpidio Jamil is a 74 y.o. male with the following Problems and Medications.  Patient Active Problem List   Diagnosis    Abnormal chest xray    Acne    Acquired elongated uvula    Agatston coronary artery calcium score between 200 and 399    Allergic rhinitis    Anxiety disorder    Arthritis    Benign prostatic hyperplasia (BPH) with urinary urgency    Calcification of coronary artery    Chronic pansinusitis    Depression, major, single episode, mild (CMS-HCC)    Eczema of hand    Elevated hemoglobin A1c    Elevated triglycerides with high cholesterol    Environmental and seasonal allergies    Esophageal reflux    Eustachian tube dysfunction, bilateral    Excessive ear wax, bilateral    Cerumen impaction    Hearing loss    IFG (impaired fasting glucose)     Hypercholesterolemia    Insomnia    Low vitamin B12 level    Neuritis of left ulnar nerve    IVONNE on CPAP    Other microscopic hematuria    Periodic limb movements of sleep    Pre-diabetes    Psoriasis    Sensorineural hearing loss (SNHL) of both ears    Asymmetric SNHL (sensorineural hearing loss)    Sleep-related hypoxia    Urinary frequency    History of iron deficiency    PVC (premature ventricular contraction)    Viral URI with cough    Overweight with body mass index (BMI) of 27 to 27.9 in adult    Overweight with body mass index (BMI) of 28 to 28.9 in adult    Bursitis of right shoulder    History of esophageal stricture    Right shoulder pain     Current Outpatient Medications   Medication Sig Dispense Refill    aspirin 81 mg EC tablet Take 1 tablet (81 mg) by mouth once daily. with food usually in the am      atenolol (Tenormin) 25 mg tablet TAKE 1 TABLET BY MOUTH ONCE  DAILY 30 tablet 11    atorvastatin (Lipitor) 40 mg tablet TAKE 1 TABLET BY MOUTH ONCE  DAILY AT BEDTIME 90 tablet 3    CALCIUM CITRATE-VITAMIN D3 ORAL Take 1 tablet by mouth 1 (one) time each day.      cetirizine (ZYRTEC) 10 mg capsule Take 1 capsule (10 mg) by mouth once daily at bedtime. 90 capsule 3    cyanocobalamin, vitamin B-12, 1,000 mcg capsule Take 1 capsule by mouth 5 (five) times a week.      famotidine (Pepcid) 40 mg tablet Take by mouth. 1 tab every evening per dr mooney:9/2021 gi trying to wean him from this to famotidine but so far gets hb back after 2 days of famo and has to take ppi 3rd night      FLUoxetine (PROzac) 10 mg capsule Take 1 capsule (10 mg) by mouth once daily. with 40mg to equal 50mg daily 90 capsule 3    FLUoxetine (PROzac) 40 mg capsule TAKE 1 CAPSULE BY MOUTH ONCE  DAILY WITH 10 MG TO MAKE 50 MG 90 capsule 3    fluticasone (Flonase) 50 mcg/actuation nasal spray Administer 2 sprays into each nostril 2 times a day. 64 g 3    lansoprazole (Prevacid) 15 mg DR capsule Take one cap daily  30 minutes ac supper or  breakfast during March 2024, then one cap daily prn breakthrough heartburn. 30 capsule 2    meloxicam (Mobic) 15 mg tablet Take 1 tablet (15 mg) by mouth early in the morning..      omega-3 acid ethyl esters (Lovaza) 1 gram capsule Take 3 capsules (3 g) by mouth once daily. Take one cap in the am. And 2 caps in the pm.      psyllium husk-calcium (Metamucil Plus Calcium) 1-60 gram-mg capsule Take 5 capsules by mouth 1 (one) time each day.      tamsulosin (Flomax) 0.4 mg 24 hr capsule Take 1 capsule (0.4 mg) by mouth once daily. In the evening      traZODone (Desyrel) 50 mg tablet Take 1 tablet (50 mg) by mouth once daily at bedtime. Patient takes 1/2 14 tablet 0    triamcinolone (Kenalog) 0.1 % ointment Apply topically 2 times a day. (Patient taking differently: Apply 1 Application topically 2 times a day as needed for rash.) 454 g 3     No current facility-administered medications for this visit.       PMH:  Past Medical History:   Diagnosis Date    Abdominal distension (gaseous) 03/23/2022    Abdominal bloating    Abnormal findings on diagnostic imaging of heart and coronary circulation     Abnormal computed tomography angiography of heart    Acute upper respiratory infection, unspecified 08/03/2022    Upper respiratory infection with cough and congestion    Anemia     Anxiety     Arthritis     Benign neoplasm of colon, unspecified 12/11/2012    Benign neoplasm of large intestine    Benign paroxysmal vertigo, unspecified ear 03/01/2017    BPV (benign positional vertigo)    Benign prostatic hyperplasia without lower urinary tract symptoms 08/05/2021    Benign prostatic hyperplasia without lower urinary tract symptoms    Body mass index (BMI) 27.0-27.9, adult 08/22/2019    BMI 27.0-27.9,adult    Body mass index (BMI) 28.0-28.9, adult 10/28/2021    BMI 28.0-28.9,adult    Cataract     COVID-19 11/10/2022    COVID-19 virus infection    COVID-19 virus infection 03/14/2023    Eczema     Encounter for follow-up examination  after completed treatment for conditions other than malignant neoplasm 08/29/2018    Hospital discharge follow-up    Encounter for immunization 08/22/2019    Need for shingles vaccine    Encounter for immunization 06/28/2017    Need for Tdap vaccination    Encounter for screening for malignant neoplasm of prostate 11/22/2014    Special screening examination for neoplasm of prostate    Encounter for screening for malignant neoplasm of prostate 07/08/2018    Screening for prostate cancer    Encounter for screening for other viral diseases 08/05/2021    Need for hepatitis C screening test    GERD (gastroesophageal reflux disease)     Impacted cerumen, bilateral 09/10/2020    Excessive ear wax, bilateral    Low back pain, unspecified 01/11/2022    Acute lumbar back pain    Muscle spasm of back 01/11/2022    Muscle spasm of back    Other conditions influencing health status     Pneumonia    Other enthesopathies, not elsewhere classified 03/09/2015    Tendonitis of shoulder    Other enthesopathy of right foot and ankle 12/07/2021    Tendinitis of right foot    Other fecal abnormalities 03/09/2015    Heme positive stool    Other muscle spasm 09/04/2018    Trapezius muscle spasm    Other prurigo 04/06/2021    Pruritic rash    Other skin changes     Skin irritation    Other skin changes due to chronic exposure to nonionizing radiation 03/29/2016    Sun-damaged skin    Other specified symptoms and signs involving the circulatory and respiratory systems 11/10/2022    Rales    Pain in left knee 09/03/2021    Knee pain, left    Pain in right knee 12/01/2020    Pain and swelling of right knee    Palpitations     Heart palpitations    Palpitations 01/31/2023    Personal history of colonic polyps     History of colonic polyps    Personal history of diseases of the blood and blood-forming organs and certain disorders involving the immune mechanism 11/20/2013    History of anemia    Personal history of diseases of the skin and  subcutaneous tissue     History of dermatitis    Personal history of diseases of the skin and subcutaneous tissue 10/28/2021    History of skin pruritus    Personal history of other diseases of the circulatory system     History of mitral valve disorder    Personal history of other diseases of the digestive system     History of gastroesophageal reflux (GERD)    Personal history of other diseases of the digestive system 02/14/2019    History of esophageal stricture    Personal history of other diseases of the respiratory system 06/07/2019    History of paranasal sinus congestion    Personal history of other diseases of the respiratory system 11/10/2022    History of pleural effusion    Personal history of other drug therapy 05/21/2018    History of pneumococcal vaccination    Personal history of other drug therapy 10/14/2016    History of influenza vaccination    Personal history of other endocrine, nutritional and metabolic disease     History of high cholesterol    Personal history of other endocrine, nutritional and metabolic disease 08/22/2019    History of iron deficiency    Personal history of other medical treatment     History of nuclear stress test    Personal history of other specified conditions 09/04/2018    History of chest pain    Personal history of other specified conditions 06/08/2021    History of headache    Personal history of other specified conditions 05/14/2019    History of chronic cough    Personal history of other specified conditions 02/12/2019    History of dysphagia    Personal history of other specified conditions 10/08/2015    History of hoarseness    Personal history of other specified conditions 11/10/2022    History of wheezing    Personal history of pneumonia (recurrent) 11/10/2022    History of pneumonia    Personal history of pneumonia (recurrent)     History of pneumonia    Pleural effusion 03/14/2023    Pneumonia 03/14/2023    Pruritus ani 08/23/2016    Perianal irritation     Pyuria 08/22/2019    Pyuria    Rales 03/14/2023    Superficial mycosis, unspecified 10/08/2015    Fungal dermatitis    Unspecified abdominal hernia without obstruction or gangrene     Hernia    Varicella     Ventricular premature depolarization 10/19/2021    PVC (premature ventricular contraction)    Wheezing 03/14/2023    Zoster without complications 08/10/2018    Herpes zoster without complication       PSH:  Past Surgical History:   Procedure Laterality Date    CIRCUMCISION, PRIMARY      COLONOSCOPY  04/26/2012    Colonoscopy (Fiberoptic)    ESOPHAGOGASTRODUODENOSCOPY  01/08/2015    Diagnostic Esophagogastroduodenoscopy    EYE SURGERY      HERNIA REPAIR  06/29/2015    Hernia Repair    HERNIA REPAIR  04/26/2012    Inguinal Hernia Repair    OTHER SURGICAL HISTORY  02/12/2019    Myringotomy    OTHER SURGICAL HISTORY  01/08/2015    Repair Of Esophageal Stricture    OTHER SURGICAL HISTORY  12/07/2021    Skin lesion excision    OTHER SURGICAL HISTORY  12/09/2021    Mohs surgery    OTHER SURGICAL HISTORY  04/26/2012    Evaluation Of Swallowing And Oral Function    OTHER SURGICAL HISTORY  04/26/2012    Chest Tube Insertion       FMH:  Family History   Problem Relation Name Age of Onset    COPD Mother Jennifer     Dementia Mother Jennifer     Hyperlipidemia Mother Jennifer     Coronary artery disease Father Eben         Premature    Other (cardiac disorder) Father Eben     Early natural death Father Eben     Breast cancer Daughter Yany     Colon cancer Maternal Grandmother ELIDA LOVETT     Cancer Maternal Grandmother ELIDA LOVETT     Cancer Maternal Grandfather DEE LOVETT        SHx:  Social History     Tobacco Use    Smoking status: Never     Passive exposure: Past    Smokeless tobacco: Never    Tobacco comments:     Grandparents and father smoked   Vaping Use    Vaping status: Never Used   Substance Use Topics    Alcohol use: Never    Drug use: Never       Allergies:  Allergies   Allergen Reactions     Nirmatrelvir-Ritonavir Nausea/vomiting     AKA.. Paxlovid       Assessment/Plan  Suggested a trial off of tamsulosin as he didn't really note any improvement after starting it. He can resume if he notes a difference. Patient would like to try a medication for OAB. Start Vesicare 5 mg daily. Patient can discontinue screening for prostate cancer as he is very low risk given significantly low PSA, age. FU in 3 months by ADAIR Bailey Attestation  By signing my name below, I, Leo Arce   attest that this documentation has been prepared under the direction and in the presence of Adam Saunders MD.

## 2024-08-19 ENCOUNTER — HOSPITAL ENCOUNTER (OUTPATIENT)
Dept: RADIOLOGY | Facility: CLINIC | Age: 74
Discharge: HOME | End: 2024-08-19
Payer: MEDICARE

## 2024-08-19 DIAGNOSIS — R05.3 CHRONIC COUGH: ICD-10-CM

## 2024-08-19 PROCEDURE — 71250 CT THORAX DX C-: CPT | Performed by: RADIOLOGY

## 2024-08-19 PROCEDURE — 71250 CT THORAX DX C-: CPT

## 2024-09-12 DIAGNOSIS — F32.0 DEPRESSION, MAJOR, SINGLE EPISODE, MILD (CMS-HCC): ICD-10-CM

## 2024-09-12 RX ORDER — TRAZODONE HYDROCHLORIDE 50 MG/1
50 TABLET ORAL NIGHTLY
Qty: 14 TABLET | Refills: 0 | Status: SHIPPED | OUTPATIENT
Start: 2024-09-12

## 2024-09-16 PROBLEM — E61.1 IRON DEFICIENCY: Status: ACTIVE | Noted: 2024-09-16

## 2024-09-16 PROBLEM — R05.3 CHRONIC COUGH: Status: ACTIVE | Noted: 2024-09-16

## 2024-09-16 PROBLEM — R06.83 SNORING: Status: ACTIVE | Noted: 2024-09-16

## 2024-09-16 PROBLEM — E78.2 ELEVATED TRIGLYCERIDES WITH HIGH CHOLESTEROL: Status: RESOLVED | Noted: 2023-01-31 | Resolved: 2024-09-16

## 2024-10-08 ENCOUNTER — OFFICE VISIT (OUTPATIENT)
Dept: CARDIOLOGY | Facility: CLINIC | Age: 74
End: 2024-10-08
Payer: MEDICARE

## 2024-10-08 VITALS
WEIGHT: 197 LBS | BODY MASS INDEX: 28.2 KG/M2 | HEIGHT: 70 IN | HEART RATE: 62 BPM | SYSTOLIC BLOOD PRESSURE: 94 MMHG | OXYGEN SATURATION: 97 % | DIASTOLIC BLOOD PRESSURE: 63 MMHG

## 2024-10-08 DIAGNOSIS — R93.1 AGATSTON CORONARY ARTERY CALCIUM SCORE BETWEEN 200 AND 399: ICD-10-CM

## 2024-10-08 DIAGNOSIS — I25.10 CALCIFICATION OF CORONARY ARTERY: ICD-10-CM

## 2024-10-08 DIAGNOSIS — I49.3 PVC (PREMATURE VENTRICULAR CONTRACTION): ICD-10-CM

## 2024-10-08 DIAGNOSIS — E78.00 HYPERCHOLESTEROLEMIA: ICD-10-CM

## 2024-10-08 PROCEDURE — 1159F MED LIST DOCD IN RCRD: CPT | Performed by: STUDENT IN AN ORGANIZED HEALTH CARE EDUCATION/TRAINING PROGRAM

## 2024-10-08 PROCEDURE — 99214 OFFICE O/P EST MOD 30 MIN: CPT | Performed by: STUDENT IN AN ORGANIZED HEALTH CARE EDUCATION/TRAINING PROGRAM

## 2024-10-08 PROCEDURE — 1126F AMNT PAIN NOTED NONE PRSNT: CPT | Performed by: STUDENT IN AN ORGANIZED HEALTH CARE EDUCATION/TRAINING PROGRAM

## 2024-10-08 PROCEDURE — 3008F BODY MASS INDEX DOCD: CPT | Performed by: STUDENT IN AN ORGANIZED HEALTH CARE EDUCATION/TRAINING PROGRAM

## 2024-10-08 PROCEDURE — 1157F ADVNC CARE PLAN IN RCRD: CPT | Performed by: STUDENT IN AN ORGANIZED HEALTH CARE EDUCATION/TRAINING PROGRAM

## 2024-10-08 RX ORDER — ATENOLOL 25 MG/1
12.5 TABLET ORAL DAILY
Qty: 45 TABLET | Refills: 3 | Status: SHIPPED | OUTPATIENT
Start: 2024-10-08 | End: 2025-10-08

## 2024-10-08 ASSESSMENT — ENCOUNTER SYMPTOMS
OCCASIONAL FEELINGS OF UNSTEADINESS: 0
DEPRESSION: 0
LOSS OF SENSATION IN FEET: 0

## 2024-10-08 ASSESSMENT — PAIN SCALES - GENERAL: PAINLEVEL: 0-NO PAIN

## 2024-10-08 NOTE — PROGRESS NOTES
"Chief Complaint:   No chief complaint on file.     History Of Present Illness:    Elpidio Jamil is a 74 y.o. male returns for yearly appointment.  Has been well since his last appoint.  Denies chest pain shortness of breath or palpitations.  Has labs ordered through his primary care physician.  Blood pressure is on the lower side today he remains on atenolol 25 mg once a day.     Last Recorded Vitals:  Vitals:    10/08/24 1154   BP: 94/63   BP Location: Right arm   Patient Position: Sitting   Pulse: 62   SpO2: 97%   Weight: 89.4 kg (197 lb)   Height: 1.778 m (5' 10\")       Review of Systems  ROS      Allergies:  Nirmatrelvir-ritonavir    Outpatient Medications:  Current Outpatient Medications   Medication Instructions    aspirin 81 mg, oral, Daily, with food usually in the am    atenolol (TENORMIN) 12.5 mg, oral, Daily    atorvastatin (LIPITOR) 40 mg, oral, Nightly    CALCIUM CITRATE-VITAMIN D3 ORAL 1 tablet, oral, Daily    cetirizine (ZYRTEC) 10 mg, oral, Nightly    cyanocobalamin, vitamin B-12, 1,000 mcg capsule 1 capsule, oral, 5 times weekly    famotidine (Pepcid) 40 mg tablet oral, 1 tab every evening per dr mooney:9/2021 gi trying to wean him from this to famotidine but so far gets hb back after 2 days of famo and has to take ppi 3rd night    FLUoxetine (PROzac) 40 mg capsule TAKE 1 CAPSULE BY MOUTH ONCE  DAILY WITH 10 MG TO MAKE 50 MG    FLUoxetine (PROZAC) 10 mg, oral, Daily, with 40mg to equal 50mg daily    fluticasone (Flonase) 50 mcg/actuation nasal spray 2 sprays, Each Nostril, 2 times daily    lansoprazole (Prevacid) 15 mg DR capsule Take one cap daily  30 minutes ac supper or breakfast during March 2024, then one cap daily prn breakthrough heartburn.    meloxicam (MOBIC) 15 mg, oral, Daily (0630)    omega-3 acid ethyl esters (LOVAZA) 3 g, oral, Daily, Take one cap in the am. And 2 caps in the pm.    psyllium husk-calcium (Metamucil Plus Calcium) 1-60 gram-mg capsule 5 capsules, oral, Daily    " solifenacin (VESICARE) 5 mg, oral, Daily, Swallow tablet whole; do not crush, chew, or split.    tamsulosin (FLOMAX) 0.4 mg, oral, Daily, In the evening    traZODone (DESYREL) 50 mg, oral, Nightly, Patient takes 1/2    triamcinolone (Kenalog) 0.1 % ointment Topical, 2 times daily       Physical Exam:  General: No acute distress,  A&O x3  Skin: Warm and dry  Neck: JVD is not elevated  ENT: Moist mucous membranes no lesions appreciated  Pulmonary: CTAB  Cards: Regular rate rhythm, no murmurs gallops or rubs appreciated normal S1-S2  Abdomen: Soft nontender nondistended  Extremities: No edema or cyanosis  Psych: Appropriate mood and affect        Last Labs:  CBC -  Lab Results   Component Value Date    WBC 8.1 10/05/2023    HGB 14.3 10/05/2023    HCT 43.5 10/05/2023    MCV 94 10/05/2023     10/05/2023       CMP -  Lab Results   Component Value Date    CALCIUM 9.6 10/05/2023    PHOS 4.1 06/16/2022    PROT 6.9 10/05/2023    ALBUMIN 4.4 10/05/2023    AST 16 10/05/2023    ALT 24 10/05/2023    ALKPHOS 45 10/05/2023    BILITOT 0.6 10/05/2023       LIPID PANEL -   Lab Results   Component Value Date    CHOL 132 03/09/2023    TRIG 174 (H) 03/09/2023    HDL 36.0 (A) 03/09/2023    CHHDL 3.7 03/09/2023    LDLF 61 03/09/2023    VLDL 35 03/09/2023    NHDL 93 03/29/2022       RENAL FUNCTION PANEL -   Lab Results   Component Value Date    GLUCOSE 101 (H) 10/05/2023     10/05/2023    K 4.3 10/05/2023     10/05/2023    CO2 31 10/05/2023    ANIONGAP 12 10/05/2023    BUN 17 10/05/2023    CREATININE 0.76 10/05/2023    GFRMALE >90 03/09/2023    CALCIUM 9.6 10/05/2023    PHOS 4.1 06/16/2022    ALBUMIN 4.4 10/05/2023        Lab Results   Component Value Date    HGBA1C 5.7 07/02/2024       Last Cardiology Tests:  ECG:  No results found for this or any previous visit (from the past 4464 hour(s)).     Echo:  No results found for this or any previous visit from the past 1095 days.       Ejection Fractions:  No results found  "for: \"EF\"    Assessment and Plan    1. Coronary artery disease: Increase in calcium score to 398. Patient remains asymptomatic however concerned due to family history of premature MI. He is maintained on aspirin and statin therapy. Most recent lipids show mildly elevated LDLs. Previous exercise stress tests 4 years ago did not show evidence of ischemia by EKG. repeat nuclear stress test showed no evidence of ischemia by perfusion or ECG. He is on atorvastatin 40 mg.  Cholesterol is at goal.  Continue current medication regimen for now.     2. History of PVCs: Traditionally controlled with low-dose metoprolol.  This has since been discontinued out of concern for drug related rash.  Patient is on atenolol for PVC suppression.     3. Hyperlipidemia: Patient is on atorvastatin for lipid management.  He is fasting lipid panel ordered through his primary care physician who is slated to see in the next few weeks.     4.  Hypotension: Reduce atenolol to 12.5 mg daily.  Discontinue if blood pressures remain less than 100 systolic.    (This note was generated with voice recognition software and may contain errors including spelling, grammar, syntax and missed recognition of what was dictated, of which may not have been fully corrected)       Philip You MD PhD  "

## 2024-10-10 ENCOUNTER — LAB (OUTPATIENT)
Dept: LAB | Facility: LAB | Age: 74
End: 2024-10-10
Payer: MEDICARE

## 2024-10-10 DIAGNOSIS — N40.1 BENIGN PROSTATIC HYPERPLASIA (BPH) WITH URINARY URGENCY: ICD-10-CM

## 2024-10-10 DIAGNOSIS — I10 BENIGN ESSENTIAL HYPERTENSION: ICD-10-CM

## 2024-10-10 DIAGNOSIS — E78.2 ELEVATED TRIGLYCERIDES WITH HIGH CHOLESTEROL: ICD-10-CM

## 2024-10-10 DIAGNOSIS — R39.15 BENIGN PROSTATIC HYPERPLASIA (BPH) WITH URINARY URGENCY: ICD-10-CM

## 2024-10-10 DIAGNOSIS — R73.03 PRE-DIABETES: ICD-10-CM

## 2024-10-10 DIAGNOSIS — E78.00 HYPERCHOLESTEROLEMIA: ICD-10-CM

## 2024-10-10 DIAGNOSIS — R79.89 LOW VITAMIN B12 LEVEL: ICD-10-CM

## 2024-10-10 DIAGNOSIS — Z51.81 ENCOUNTER FOR MONITORING STATIN THERAPY: ICD-10-CM

## 2024-10-10 DIAGNOSIS — Z79.899 ENCOUNTER FOR MONITORING STATIN THERAPY: ICD-10-CM

## 2024-10-10 DIAGNOSIS — K21.9 GASTROESOPHAGEAL REFLUX DISEASE, UNSPECIFIED WHETHER ESOPHAGITIS PRESENT: ICD-10-CM

## 2024-10-10 LAB
ALBUMIN SERPL BCP-MCNC: 4.8 G/DL (ref 3.4–5)
ALP SERPL-CCNC: 53 U/L (ref 33–136)
ALT SERPL W P-5'-P-CCNC: 24 U/L (ref 10–52)
ANION GAP SERPL CALC-SCNC: 12 MMOL/L (ref 10–20)
AST SERPL W P-5'-P-CCNC: 17 U/L (ref 9–39)
BILIRUB SERPL-MCNC: 0.9 MG/DL (ref 0–1.2)
BUN SERPL-MCNC: 12 MG/DL (ref 6–23)
CALCIUM SERPL-MCNC: 9.8 MG/DL (ref 8.6–10.6)
CHLORIDE SERPL-SCNC: 101 MMOL/L (ref 98–107)
CHOLEST SERPL-MCNC: 139 MG/DL (ref 0–199)
CHOLESTEROL/HDL RATIO: 3.5
CK SERPL-CCNC: 98 U/L (ref 0–325)
CO2 SERPL-SCNC: 31 MMOL/L (ref 21–32)
CREAT SERPL-MCNC: 0.8 MG/DL (ref 0.5–1.3)
CREAT UR-MCNC: 170 MG/DL (ref 20–370)
EGFRCR SERPLBLD CKD-EPI 2021: >90 ML/MIN/1.73M*2
ERYTHROCYTE [DISTWIDTH] IN BLOOD BY AUTOMATED COUNT: 12.3 % (ref 11.5–14.5)
EST. AVERAGE GLUCOSE BLD GHB EST-MCNC: 128 MG/DL
GLUCOSE SERPL-MCNC: 113 MG/DL (ref 74–99)
HBA1C MFR BLD: 6.1 %
HCT VFR BLD AUTO: 43.7 % (ref 41–52)
HDLC SERPL-MCNC: 39.6 MG/DL
HGB BLD-MCNC: 14.6 G/DL (ref 13.5–17.5)
LDLC SERPL CALC-MCNC: 64 MG/DL
LDLC SERPL DIRECT ASSAY-MCNC: 78 MG/DL (ref 0–129)
MAGNESIUM SERPL-MCNC: 1.98 MG/DL (ref 1.6–2.4)
MCH RBC QN AUTO: 30.7 PG (ref 26–34)
MCHC RBC AUTO-ENTMCNC: 33.4 G/DL (ref 32–36)
MCV RBC AUTO: 92 FL (ref 80–100)
MICROALBUMIN UR-MCNC: 9.1 MG/L
MICROALBUMIN/CREAT UR: 5.4 UG/MG CREAT
NON HDL CHOLESTEROL: 99 MG/DL (ref 0–149)
NRBC BLD-RTO: 0 /100 WBCS (ref 0–0)
PLATELET # BLD AUTO: 233 X10*3/UL (ref 150–450)
POTASSIUM SERPL-SCNC: 4.2 MMOL/L (ref 3.5–5.3)
PROT SERPL-MCNC: 7.4 G/DL (ref 6.4–8.2)
PSA SERPL-MCNC: 0.25 NG/ML
RBC # BLD AUTO: 4.75 X10*6/UL (ref 4.5–5.9)
SODIUM SERPL-SCNC: 140 MMOL/L (ref 136–145)
TRIGL SERPL-MCNC: 178 MG/DL (ref 0–149)
TSH SERPL-ACNC: 1.16 MIU/L (ref 0.44–3.98)
URATE SERPL-MCNC: 4.9 MG/DL (ref 4–7.5)
VIT B12 SERPL-MCNC: >2000 PG/ML (ref 211–911)
VLDL: 36 MG/DL (ref 0–40)
WBC # BLD AUTO: 9 X10*3/UL (ref 4.4–11.3)

## 2024-10-10 PROCEDURE — 84443 ASSAY THYROID STIM HORMONE: CPT

## 2024-10-10 PROCEDURE — 85027 COMPLETE CBC AUTOMATED: CPT

## 2024-10-10 PROCEDURE — 82043 UR ALBUMIN QUANTITATIVE: CPT

## 2024-10-10 PROCEDURE — 82550 ASSAY OF CK (CPK): CPT

## 2024-10-10 PROCEDURE — 84153 ASSAY OF PSA TOTAL: CPT

## 2024-10-10 PROCEDURE — 80053 COMPREHEN METABOLIC PANEL: CPT

## 2024-10-10 PROCEDURE — 82570 ASSAY OF URINE CREATININE: CPT

## 2024-10-10 PROCEDURE — 36415 COLL VENOUS BLD VENIPUNCTURE: CPT

## 2024-10-10 PROCEDURE — 83036 HEMOGLOBIN GLYCOSYLATED A1C: CPT

## 2024-10-10 PROCEDURE — 83735 ASSAY OF MAGNESIUM: CPT

## 2024-10-10 PROCEDURE — 82607 VITAMIN B-12: CPT

## 2024-10-10 PROCEDURE — 83721 ASSAY OF BLOOD LIPOPROTEIN: CPT

## 2024-10-10 PROCEDURE — 84550 ASSAY OF BLOOD/URIC ACID: CPT

## 2024-10-10 PROCEDURE — 80061 LIPID PANEL: CPT

## 2024-10-14 ENCOUNTER — TELEMEDICINE (OUTPATIENT)
Dept: NEUROLOGY | Facility: HOSPITAL | Age: 74
End: 2024-10-14
Payer: MEDICARE

## 2024-10-14 DIAGNOSIS — G47.34 SLEEP-RELATED HYPOXIA: ICD-10-CM

## 2024-10-14 DIAGNOSIS — F32.0 DEPRESSION, MAJOR, SINGLE EPISODE, MILD (CMS-HCC): ICD-10-CM

## 2024-10-14 DIAGNOSIS — G47.33 OSA ON CPAP: ICD-10-CM

## 2024-10-14 DIAGNOSIS — G47.00 INSOMNIA, UNSPECIFIED TYPE: ICD-10-CM

## 2024-10-14 DIAGNOSIS — G47.33 OBSTRUCTIVE SLEEP APNEA SYNDROME: Primary | ICD-10-CM

## 2024-10-14 PROCEDURE — 1157F ADVNC CARE PLAN IN RCRD: CPT | Performed by: NURSE PRACTITIONER

## 2024-10-14 PROCEDURE — 1036F TOBACCO NON-USER: CPT | Performed by: NURSE PRACTITIONER

## 2024-10-14 PROCEDURE — 1160F RVW MEDS BY RX/DR IN RCRD: CPT | Performed by: NURSE PRACTITIONER

## 2024-10-14 PROCEDURE — 99214 OFFICE O/P EST MOD 30 MIN: CPT | Performed by: NURSE PRACTITIONER

## 2024-10-14 PROCEDURE — 1159F MED LIST DOCD IN RCRD: CPT | Performed by: NURSE PRACTITIONER

## 2024-10-14 PROCEDURE — 99214 OFFICE O/P EST MOD 30 MIN: CPT | Mod: 95 | Performed by: NURSE PRACTITIONER

## 2024-10-14 RX ORDER — TRAZODONE HYDROCHLORIDE 50 MG/1
50 TABLET ORAL NIGHTLY
Qty: 90 TABLET | Refills: 1 | Status: SHIPPED | OUTPATIENT
Start: 2024-10-14

## 2024-10-14 NOTE — PROGRESS NOTES
[facility]    SUBJECTIVE    Elpidio Jamil is a 74 y.o. right-handed male who presents with   Chief Complaint   Patient presents with    Sleep Apnea        Presents for follow up visit  Visit type: virtual visit Virtual or Telephone Consent    An interactive audio and video telecommunication system which permits real time communications between the patient (at the originating site) and provider (at the distant site) was utilized to provide this telehealth service.   Verbal consent was requested and obtained from Elpidio Jamil on this date, 10/14/24 for a telehealth visit.     HISTORY OF PRESENT ILLNESS    RECAP:  Former patient of Dr. Ying, last seen October 2023.   First saw him 2/4/2021 ref by ENT Dr Torres for IVONNE. Had PSG done at [facility]. Was snoring, was keeping wife awake. Went to see Dr Torres, and he ordered PSG. PSG done 10/28/2020 (ESS 10, 197 pounds) showed 4% overall AHI 10, REM AHI 27, min SPO2 81%, time with SPO2 less than 87% 11 minutes, PLM index 6.7, PLM arousal index 1.3. Patient followed up with Dr. Torres, and was referred to sleep medicine for further evaluation and treatment. 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 Some daytime sleepiness. Sometimes takes nap in the afternoon. Denies drowsy driving or car accident. ESS 4/24. No cardiac issues/CAD except PVCs. No stroke. No diabetes. I ordered auto-CPAP 5-15 cm H20 and he was doing well.     Here for follow-up today.  He is doing well.  He is using his CPAP nightly.  He is not having any issues.  He is getting supplies.  He denies any sleepiness or snoring.    CPAP DL reviewed.     10/14/24 -     CPAP download data showed:   [employer] Auto-CPAP  Date range: 9/14/24 - 10/13/24  Auto-CPAP 5-15 cm H20,   Pressure (cm H20): average 5.9, 90% 7.4  % used days >=4 hours: 96.7%  Average daily usage (days used): 6:46 h  Leak: not an issue  Residual AHI: 3.9    Pt is symptomatically benefiting from CPAP  use.    Feels fine on CPAP. Trouble sleeping at night, getting to sleep. On trazodone for a long time, went down to half tablet. Now napping during the day. Woke up at 5 am, got up.     Goes to bed 10 pm. Falling asleep 11 pm. Waking up during the night a couple times, not sure why. Taking 15 min to fall back to sleep. Waking up 6 am typically.     REVIEW OF SYSTEMS:  10 point review of systems performed and is negative except as noted in the HPI.     Past Medical History:   Diagnosis Date    Abdominal distension (gaseous) 03/23/2022    Abdominal bloating    Abnormal findings on diagnostic imaging of heart and coronary circulation     Abnormal computed tomography angiography of heart    Acute upper respiratory infection, unspecified 08/03/2022    Upper respiratory infection with cough and congestion    Anemia     Anxiety     Arrhythmia     Arthritis     Benign neoplasm of colon, unspecified 12/11/2012    Benign neoplasm of large intestine    Benign paroxysmal vertigo, unspecified ear 03/01/2017    BPV (benign positional vertigo)    Benign prostatic hyperplasia without lower urinary tract symptoms 08/05/2021    Benign prostatic hyperplasia without lower urinary tract symptoms    Body mass index (BMI) 27.0-27.9, adult 08/22/2019    BMI 27.0-27.9,adult    Body mass index (BMI) 28.0-28.9, adult 10/28/2021    BMI 28.0-28.9,adult    Cataract     COVID-19 11/10/2022    COVID-19 virus infection    COVID-19 virus infection 03/14/2023    Eczema     Encounter for follow-up examination after completed treatment for conditions other than malignant neoplasm 08/29/2018    Hospital discharge follow-up    Encounter for immunization 08/22/2019    Need for shingles vaccine    Encounter for immunization 06/28/2017    Need for Tdap vaccination    Encounter for screening for malignant neoplasm of prostate 11/22/2014    Special screening examination for neoplasm of prostate    Encounter for screening for malignant neoplasm of prostate  07/08/2018    Screening for prostate cancer    Encounter for screening for other viral diseases 08/05/2021    Need for hepatitis C screening test    GERD (gastroesophageal reflux disease)     Impacted cerumen, bilateral 09/10/2020    Excessive ear wax, bilateral    Low back pain, unspecified 01/11/2022    Acute lumbar back pain    Muscle spasm of back 01/11/2022    Muscle spasm of back    Other conditions influencing health status     Pneumonia    Other enthesopathies, not elsewhere classified 03/09/2015    Tendonitis of shoulder    Other enthesopathy of right foot and ankle 12/07/2021    Tendinitis of right foot    Other fecal abnormalities 03/09/2015    Heme positive stool    Other muscle spasm 09/04/2018    Trapezius muscle spasm    Other prurigo 04/06/2021    Pruritic rash    Other skin changes     Skin irritation    Other skin changes due to chronic exposure to nonionizing radiation 03/29/2016    Sun-damaged skin    Other specified symptoms and signs involving the circulatory and respiratory systems 11/10/2022    Rales    Pain in left knee 09/03/2021    Knee pain, left    Pain in right knee 12/01/2020    Pain and swelling of right knee    Palpitations     Heart palpitations    Palpitations 01/31/2023    Personal history of colonic polyps     History of colonic polyps    Personal history of diseases of the blood and blood-forming organs and certain disorders involving the immune mechanism 11/20/2013    History of anemia    Personal history of diseases of the skin and subcutaneous tissue     History of dermatitis    Personal history of diseases of the skin and subcutaneous tissue 10/28/2021    History of skin pruritus    Personal history of other diseases of the circulatory system     History of mitral valve disorder    Personal history of other diseases of the digestive system     History of gastroesophageal reflux (GERD)    Personal history of other diseases of the digestive system 02/14/2019    History of  esophageal stricture    Personal history of other diseases of the respiratory system 06/07/2019    History of paranasal sinus congestion    Personal history of other diseases of the respiratory system 11/10/2022    History of pleural effusion    Personal history of other drug therapy 05/21/2018    History of pneumococcal vaccination    Personal history of other drug therapy 10/14/2016    History of influenza vaccination    Personal history of other endocrine, nutritional and metabolic disease     History of high cholesterol    Personal history of other endocrine, nutritional and metabolic disease 08/22/2019    History of iron deficiency    Personal history of other medical treatment     History of nuclear stress test    Personal history of other specified conditions 09/04/2018    History of chest pain    Personal history of other specified conditions 06/08/2021    History of headache    Personal history of other specified conditions 05/14/2019    History of chronic cough    Personal history of other specified conditions 02/12/2019    History of dysphagia    Personal history of other specified conditions 10/08/2015    History of hoarseness    Personal history of other specified conditions 11/10/2022    History of wheezing    Personal history of pneumonia (recurrent) 11/10/2022    History of pneumonia    Personal history of pneumonia (recurrent)     History of pneumonia    Pleural effusion 03/14/2023    Pneumonia 03/14/2023    Pruritus ani 08/23/2016    Perianal irritation    Pyuria 08/22/2019    Pyuria    Rales 03/14/2023    Shingles     Sleep apnea     Superficial mycosis, unspecified 10/08/2015    Fungal dermatitis    Unspecified abdominal hernia without obstruction or gangrene     Hernia    Varicella     Ventricular premature depolarization 10/19/2021    PVC (premature ventricular contraction)    Wheezing 03/14/2023    Zoster without complications 08/10/2018    Herpes zoster without complication       Family  medical history includes dementia in mother.    Past Surgical History:   Procedure Laterality Date    CIRCUMCISION, PRIMARY      COLONOSCOPY  04/26/2012    Colonoscopy (Fiberoptic)    ESOPHAGOGASTRODUODENOSCOPY  01/08/2015    Diagnostic Esophagogastroduodenoscopy    EYE SURGERY      HERNIA REPAIR  06/29/2015    Hernia Repair    HERNIA REPAIR  04/26/2012    Inguinal Hernia Repair    OTHER SURGICAL HISTORY  02/12/2019    Myringotomy    OTHER SURGICAL HISTORY  01/08/2015    Repair Of Esophageal Stricture    OTHER SURGICAL HISTORY  12/07/2021    Skin lesion excision    OTHER SURGICAL HISTORY  12/09/2021    Mohs surgery    OTHER SURGICAL HISTORY  04/26/2012    Evaluation Of Swallowing And Oral Function    OTHER SURGICAL HISTORY  04/26/2012    Chest Tube Insertion       Social History     Substance and Sexual Activity   Drug Use Never     Tobacco Use: Low Risk  (10/14/2024)    Patient History     Smoking Tobacco Use: Never     Smokeless Tobacco Use: Never     Passive Exposure: Past     Alcohol Use: Not At Risk (7/2/2024)    AUDIT-C     Frequency of Alcohol Consumption: Never     Average Number of Drinks: Patient does not drink     Frequency of Binge Drinking: Never       Current Outpatient Medications   Medication Instructions    aspirin 81 mg, oral, Daily, with food usually in the am    atenolol (TENORMIN) 12.5 mg, oral, Daily    atorvastatin (LIPITOR) 40 mg, oral, Nightly    CALCIUM CITRATE-VITAMIN D3 ORAL 1 tablet, oral, Daily    cetirizine (ZYRTEC) 10 mg, oral, Nightly    cyanocobalamin, vitamin B-12, 1,000 mcg capsule 1 capsule, oral, 5 times weekly    famotidine (Pepcid) 40 mg tablet oral, 1 tab every evening per dr mooney:9/2021 gi trying to wean him from this to famotidine but so far gets hb back after 2 days of famo and has to take ppi 3rd night    FLUoxetine (PROzac) 40 mg capsule TAKE 1 CAPSULE BY MOUTH ONCE  DAILY WITH 10 MG TO MAKE 50 MG    FLUoxetine (PROZAC) 10 mg, oral, Daily, with 40mg to equal 50mg  daily    fluticasone (Flonase) 50 mcg/actuation nasal spray 2 sprays, Each Nostril, 2 times daily    lansoprazole (Prevacid) 15 mg DR capsule Take one cap daily  30 minutes ac supper or breakfast during March 2024, then one cap daily prn breakthrough heartburn.    meloxicam (MOBIC) 15 mg, oral, Daily (0630)    omega-3 acid ethyl esters (LOVAZA) 3 g, oral, Daily, Take one cap in the am. And 2 caps in the pm.    psyllium husk-calcium (Metamucil Plus Calcium) 1-60 gram-mg capsule 5 capsules, oral, Daily    solifenacin (VESICARE) 5 mg, oral, Daily, Swallow tablet whole; do not crush, chew, or split.    tamsulosin (FLOMAX) 0.4 mg, oral, Daily, In the evening    traZODone (DESYREL) 50 mg, oral, Nightly, Patient takes 1/2    triamcinolone (Kenalog) 0.1 % ointment Topical, 2 times daily         OBJECTIVE    There were no vitals taken for this visit.      Physical Exam  Eyes:      General: Lids are normal.      Extraocular Movements: Extraocular movements intact.   Psychiatric:         Speech: Speech normal.       Neurological Exam  Mental Status  Awake, alert and oriented to person, place and time. Oriented to person, place, time and situation. Recent and remote memory are intact. Speech is normal. Language is fluent with no aphasia. Attention and concentration are normal. Fund of knowledge is appropriate for level of education.    Cranial Nerves  CN III, IV, VI: Extraocular movements intact bilaterally. Normal lids and orbits bilaterally.  CN VII: Full and symmetric facial movement.  CN VIII: Hearing is normal.    Motor   No abnormal involuntary movements.        No results found for this or any previous visit from the past 1825 days.      Lab Results   Component Value Date    WBC 9.0 10/10/2024    RBC 4.75 10/10/2024    HGB 14.6 10/10/2024    HCT 43.7 10/10/2024     10/10/2024     10/10/2024    K 4.2 10/10/2024     10/10/2024    BUN 12 10/10/2024    CREATININE 0.80 10/10/2024    EGFR >90 10/10/2024     CALCIUM 9.8 10/10/2024    ALKPHOS 53 10/10/2024    AST 17 10/10/2024    ALT 24 10/10/2024    MG 1.98 10/10/2024    PEVATQMH22 >2,000 (H) 10/10/2024    VITD25 39 07/26/2021    HGBA1C 6.1 (H) 10/10/2024    LDLDIRECT 78 10/10/2024    LDLCALC 64 10/10/2024    CHOL 139 10/10/2024    HDL 39.6 10/10/2024    TRIG 178 (H) 10/10/2024    TSH 1.16 10/10/2024          ASSESSMENT & PLAN  Problem List Items Addressed This Visit       Insomnia    Current Assessment & Plan     Was doing well on trazodone 50 mg per PCP, was cut to 25 mg and seems to not be working as well.   To discuss with PCP about possibly going back up. Did discuss consideration of these medications with aging and concurrent prozac use but was tolerating well per pt.          Obstructive sleep apnea syndrome - Primary    Overview     DME = [employer] device  Auto CPAP 5-15 cm H2O  PSG done 10/28/2020 - 4% overall AHI 10, REM AHI 27, min SPO2 81%, time with SPO2 less than 87% 11 minutes, PLM index 6.7, PLM arousal index 1.3         Current Assessment & Plan     Continue CPAP use, PAP supplies ordered to [facility]         Relevant Orders    Follow Up In Neurology    Positive Airway Pressure (PAP) Therapy    Sleep-related hypoxia     Other Visit Diagnoses       IVONNE on CPAP                  FU in 1 year         Tabitha Palencai, APRN-CNP

## 2024-10-14 NOTE — PATIENT INSTRUCTIONS
CPAP/BiPAP  Your Positive Airway Pressure (CPAP/BiPAP) settings are adequately controlling your Apnea, please continue to use nightly.   An updated supply order has been sent to the supply company.     As a general guideline, please replace your:   -PAP cushions every 2-4 weeks  -mask every 3-6 months  -hose every 3-6 months  -Filter (disposable) every 2-4 weeks  -machine may need replacement in 5-10 years (once they stop working).     If you have any issues with supplies or need help troubleshooting your machine, please call your DME company.  [employer] - [phone]

## 2024-10-14 NOTE — ASSESSMENT & PLAN NOTE
Was doing well on trazodone 50 mg per PCP, was cut to 25 mg and seems to not be working as well.   To discuss with PCP about possibly going back up. Did discuss consideration of these medications with aging and concurrent prozac use but was tolerating well per pt.

## 2024-10-14 NOTE — TELEPHONE ENCOUNTER
Please send refill  Trazodone 50 mg  [facility]    Pt asking for a regular amount he stated he had only 14 days sent in before.

## 2024-10-16 PROBLEM — K22.89 OTHER SPECIFIED DISEASE OF ESOPHAGUS: Status: ACTIVE | Noted: 2024-10-16

## 2024-10-16 PROBLEM — K22.2 ESOPHAGEAL OBSTRUCTION: Status: ACTIVE | Noted: 2024-10-16

## 2024-10-16 PROBLEM — K31.89 OTHER DISEASES OF STOMACH AND DUODENUM: Status: ACTIVE | Noted: 2024-10-16

## 2024-11-04 NOTE — PROGRESS NOTES
HPI    74 y.o. male being seen with the following problem list:    Problem list:  BPH with frequency     08/01/24 - Patient kindly referred by Dr. aKtiuska Helm. Patient is on tamsulosin. Patient has been under the care of Dr. Elpidio Tobias who has moved to [place]. Patient states his biggest issue is frequency worsened by coffee. He can manage frequency by cutting back on coffee. He did not see a difference on tamsulosin. He has had a history of UTI and hematuria. Patient states he no longer gets erections.         LUTS  Urinary symptoms include: urgency, frequency, hematuria, UTIs, and unhappy with his urinary symptoms. Will start Vesicare 5mg for OAB.      Erections: not working well, naive to meds; both attaining and maintaining erection    11/06/24 - Seen today over telehealth, performed this visit using real-time telehealth tools, including an audio/video connection between Elpidio Jamil at home and Adam Saunders MD at [facility]– [place][note 2]. Consent for telemedicine visit was obtained. Symptomatically doing better on medication. Frequency and urgency improved on Flomax and Vesicare. Has not tried coming off flomax as of yet. Happy where he is at.           Lab Results   Component Value Date    PSA 0.25 10/10/2024    PSA 0.23 09/21/2023    PSA 0.21 08/30/2022    PSA 0.21 09/23/2021    PSA 0.17 08/05/2021              Current Medications:  Current Outpatient Medications   Medication Sig Dispense Refill    aspirin 81 mg EC tablet Take 1 tablet (81 mg) by mouth once daily. with food usually in the am      atenolol (Tenormin) 25 mg tablet Take 0.5 tablets (12.5 mg) by mouth once daily. 45 tablet 3    atorvastatin (Lipitor) 40 mg tablet TAKE 1 TABLET BY MOUTH ONCE  DAILY AT BEDTIME 90 tablet 3    CALCIUM CITRATE-VITAMIN D3 ORAL Take 1 tablet by mouth 1 (one) time each day.      cetirizine (ZYRTEC) 10 mg capsule Take 1 capsule (10 mg) by mouth once daily at bedtime. 90 capsule 3    cyanocobalamin, vitamin B-12, 1,000  mcg capsule Take 1 capsule by mouth 5 (five) times a week.      famotidine (Pepcid) 40 mg tablet Take by mouth. 1 tab every evening per dr mooney:9/2021 gi trying to wean him from this to famotidine but so far gets hb back after 2 days of famo and has to take ppi 3rd night      FLUoxetine (PROzac) 10 mg capsule Take 1 capsule (10 mg) by mouth once daily. with 40mg to equal 50mg daily 90 capsule 3    FLUoxetine (PROzac) 40 mg capsule TAKE 1 CAPSULE BY MOUTH ONCE  DAILY WITH 10 MG TO MAKE 50 MG 90 capsule 3    fluticasone (Flonase) 50 mcg/actuation nasal spray Administer 2 sprays into each nostril 2 times a day. 64 g 3    lansoprazole (Prevacid) 15 mg DR capsule Take one cap daily  30 minutes ac supper or breakfast during March 2024, then one cap daily prn breakthrough heartburn. 30 capsule 2    meloxicam (Mobic) 15 mg tablet Take 1 tablet (15 mg) by mouth early in the morning..      omega-3 acid ethyl esters (Lovaza) 1 gram capsule Take 3 capsules (3 g) by mouth once daily. Take one cap in the am. And 2 caps in the pm.      psyllium husk-calcium (Metamucil Plus Calcium) 1-60 gram-mg capsule Take 5 capsules by mouth 1 (one) time each day.      solifenacin (VESIcare) 5 mg tablet Take 1 tablet (5 mg) by mouth once daily. Swallow tablet whole; do not crush, chew, or split. 90 tablet 3    tamsulosin (Flomax) 0.4 mg 24 hr capsule Take 1 capsule (0.4 mg) by mouth once daily. In the evening      traZODone (Desyrel) 50 mg tablet Take 1 tablet (50 mg) by mouth once daily at bedtime. Patient takes 1/2 90 tablet 1    triamcinolone (Kenalog) 0.1 % ointment Apply topically 2 times a day. (Patient not taking: Reported on 10/8/2024) 454 g 3     No current facility-administered medications for this visit.        Active Problems:  Elpidio Jamil is a 74 y.o. male with the following Problems and Medications.  Patient Active Problem List   Diagnosis    Abnormal chest xray    Acne    Acquired elongated uvula    Agatston coronary artery  calcium score between 200 and 399    Allergic rhinitis    Anxiety disorder    Arthritis    Benign prostatic hyperplasia with urinary obstruction    Calcification of coronary artery    Chronic pansinusitis    Depression, major, single episode, mild (CMS-HCC)    Eczema of hand    Elevated hemoglobin A1c    Environmental and seasonal allergies    Esophageal reflux    Eustachian tube dysfunction, bilateral    Excessive ear wax, bilateral    Cerumen impaction    Hearing loss    IFG (impaired fasting glucose)    Hypercholesterolemia    Insomnia    Low vitamin B12 level    Neuritis of left ulnar nerve    Obstructive sleep apnea syndrome    Other microscopic hematuria    Periodic limb movements of sleep    Pre-diabetes    Psoriasis    Sensorineural hearing loss (SNHL) of both ears    Sleep-related hypoxia    Urinary frequency    History of iron deficiency    PVC (premature ventricular contraction)    Viral URI with cough    Overweight with body mass index (BMI) of 27 to 27.9 in adult    Bursitis of right shoulder    History of esophageal stricture    Right shoulder pain    Chronic cough    Iron deficiency    Snoring     Current Outpatient Medications   Medication Sig Dispense Refill    aspirin 81 mg EC tablet Take 1 tablet (81 mg) by mouth once daily. with food usually in the am      atenolol (Tenormin) 25 mg tablet Take 0.5 tablets (12.5 mg) by mouth once daily. 45 tablet 3    atorvastatin (Lipitor) 40 mg tablet TAKE 1 TABLET BY MOUTH ONCE  DAILY AT BEDTIME 90 tablet 3    CALCIUM CITRATE-VITAMIN D3 ORAL Take 1 tablet by mouth 1 (one) time each day.      cetirizine (ZYRTEC) 10 mg capsule Take 1 capsule (10 mg) by mouth once daily at bedtime. 90 capsule 3    cyanocobalamin, vitamin B-12, 1,000 mcg capsule Take 1 capsule by mouth 5 (five) times a week.      famotidine (Pepcid) 40 mg tablet Take by mouth. 1 tab every evening per dr mooney:9/2021 gi trying to wean him from this to famotidine but so far gets hb back after 2  days of famo and has to take ppi 3rd night      FLUoxetine (PROzac) 10 mg capsule Take 1 capsule (10 mg) by mouth once daily. with 40mg to equal 50mg daily 90 capsule 3    FLUoxetine (PROzac) 40 mg capsule TAKE 1 CAPSULE BY MOUTH ONCE  DAILY WITH 10 MG TO MAKE 50 MG 90 capsule 3    fluticasone (Flonase) 50 mcg/actuation nasal spray Administer 2 sprays into each nostril 2 times a day. 64 g 3    lansoprazole (Prevacid) 15 mg DR capsule Take one cap daily  30 minutes ac supper or breakfast during March 2024, then one cap daily prn breakthrough heartburn. 30 capsule 2    meloxicam (Mobic) 15 mg tablet Take 1 tablet (15 mg) by mouth early in the morning..      omega-3 acid ethyl esters (Lovaza) 1 gram capsule Take 3 capsules (3 g) by mouth once daily. Take one cap in the am. And 2 caps in the pm.      psyllium husk-calcium (Metamucil Plus Calcium) 1-60 gram-mg capsule Take 5 capsules by mouth 1 (one) time each day.      solifenacin (VESIcare) 5 mg tablet Take 1 tablet (5 mg) by mouth once daily. Swallow tablet whole; do not crush, chew, or split. 90 tablet 3    tamsulosin (Flomax) 0.4 mg 24 hr capsule Take 1 capsule (0.4 mg) by mouth once daily. In the evening      traZODone (Desyrel) 50 mg tablet Take 1 tablet (50 mg) by mouth once daily at bedtime. Patient takes 1/2 90 tablet 1    triamcinolone (Kenalog) 0.1 % ointment Apply topically 2 times a day. (Patient not taking: Reported on 10/8/2024) 454 g 3     No current facility-administered medications for this visit.       PMH:  Past Medical History:   Diagnosis Date    Abdominal distension (gaseous) 03/23/2022    Abdominal bloating    Abnormal findings on diagnostic imaging of heart and coronary circulation     Abnormal computed tomography angiography of heart    Acute upper respiratory infection, unspecified 08/03/2022    Upper respiratory infection with cough and congestion    Anemia     Anxiety     Arrhythmia     Arthritis     Benign neoplasm of colon, unspecified  12/11/2012    Benign neoplasm of large intestine    Benign paroxysmal vertigo, unspecified ear 03/01/2017    BPV (benign positional vertigo)    Benign prostatic hyperplasia without lower urinary tract symptoms 08/05/2021    Benign prostatic hyperplasia without lower urinary tract symptoms    Body mass index (BMI) 27.0-27.9, adult 08/22/2019    BMI 27.0-27.9,adult    Body mass index (BMI) 28.0-28.9, adult 10/28/2021    BMI 28.0-28.9,adult    Cataract     COVID-19 11/10/2022    COVID-19 virus infection    COVID-19 virus infection 03/14/2023    Eczema     Encounter for follow-up examination after completed treatment for conditions other than malignant neoplasm 08/29/2018    Hospital discharge follow-up    Encounter for immunization 08/22/2019    Need for shingles vaccine    Encounter for immunization 06/28/2017    Need for Tdap vaccination    Encounter for screening for malignant neoplasm of prostate 11/22/2014    Special screening examination for neoplasm of prostate    Encounter for screening for malignant neoplasm of prostate 07/08/2018    Screening for prostate cancer    Encounter for screening for other viral diseases 08/05/2021    Need for hepatitis C screening test    GERD (gastroesophageal reflux disease)     Impacted cerumen, bilateral 09/10/2020    Excessive ear wax, bilateral    Low back pain, unspecified 01/11/2022    Acute lumbar back pain    Muscle spasm of back 01/11/2022    Muscle spasm of back    Other conditions influencing health status     Pneumonia    Other enthesopathies, not elsewhere classified 03/09/2015    Tendonitis of shoulder    Other enthesopathy of right foot and ankle 12/07/2021    Tendinitis of right foot    Other fecal abnormalities 03/09/2015    Heme positive stool    Other muscle spasm 09/04/2018    Trapezius muscle spasm    Other prurigo 04/06/2021    Pruritic rash    Other skin changes     Skin irritation    Other skin changes due to chronic exposure to nonionizing radiation  03/29/2016    Sun-damaged skin    Other specified symptoms and signs involving the circulatory and respiratory systems 11/10/2022    Rales    Pain in left knee 09/03/2021    Knee pain, left    Pain in right knee 12/01/2020    Pain and swelling of right knee    Palpitations     Heart palpitations    Palpitations 01/31/2023    Personal history of colonic polyps     History of colonic polyps    Personal history of diseases of the blood and blood-forming organs and certain disorders involving the immune mechanism 11/20/2013    History of anemia    Personal history of diseases of the skin and subcutaneous tissue     History of dermatitis    Personal history of diseases of the skin and subcutaneous tissue 10/28/2021    History of skin pruritus    Personal history of other diseases of the circulatory system     History of mitral valve disorder    Personal history of other diseases of the digestive system     History of gastroesophageal reflux (GERD)    Personal history of other diseases of the digestive system 02/14/2019    History of esophageal stricture    Personal history of other diseases of the respiratory system 06/07/2019    History of paranasal sinus congestion    Personal history of other diseases of the respiratory system 11/10/2022    History of pleural effusion    Personal history of other drug therapy 05/21/2018    History of pneumococcal vaccination    Personal history of other drug therapy 10/14/2016    History of influenza vaccination    Personal history of other endocrine, nutritional and metabolic disease     History of high cholesterol    Personal history of other endocrine, nutritional and metabolic disease 08/22/2019    History of iron deficiency    Personal history of other medical treatment     History of nuclear stress test    Personal history of other specified conditions 09/04/2018    History of chest pain    Personal history of other specified conditions 06/08/2021    History of headache     Personal history of other specified conditions 05/14/2019    History of chronic cough    Personal history of other specified conditions 02/12/2019    History of dysphagia    Personal history of other specified conditions 10/08/2015    History of hoarseness    Personal history of other specified conditions 11/10/2022    History of wheezing    Personal history of pneumonia (recurrent) 11/10/2022    History of pneumonia    Personal history of pneumonia (recurrent)     History of pneumonia    Pleural effusion 03/14/2023    Pneumonia 03/14/2023    Pruritus ani 08/23/2016    Perianal irritation    Pyuria 08/22/2019    Pyuria    Rales 03/14/2023    Shingles     Sleep apnea     Superficial mycosis, unspecified 10/08/2015    Fungal dermatitis    Unspecified abdominal hernia without obstruction or gangrene     Hernia    Varicella     Ventricular premature depolarization 10/19/2021    PVC (premature ventricular contraction)    Wheezing 03/14/2023    Zoster without complications 08/10/2018    Herpes zoster without complication       PSH:  Past Surgical History:   Procedure Laterality Date    CIRCUMCISION, PRIMARY      COLONOSCOPY  04/26/2012    Colonoscopy (Fiberoptic)    ESOPHAGOGASTRODUODENOSCOPY  01/08/2015    Diagnostic Esophagogastroduodenoscopy    EYE SURGERY      HERNIA REPAIR  06/29/2015    Hernia Repair    HERNIA REPAIR  04/26/2012    Inguinal Hernia Repair    OTHER SURGICAL HISTORY  02/12/2019    Myringotomy    OTHER SURGICAL HISTORY  01/08/2015    Repair Of Esophageal Stricture    OTHER SURGICAL HISTORY  12/07/2021    Skin lesion excision    OTHER SURGICAL HISTORY  12/09/2021    Mohs surgery    OTHER SURGICAL HISTORY  04/26/2012    Evaluation Of Swallowing And Oral Function    OTHER SURGICAL HISTORY  04/26/2012    Chest Tube Insertion       FMH:  Family History   Problem Relation Name Age of Onset    COPD Mother Jennifer     Dementia Mother Jennifer     Hyperlipidemia Mother Jennifer     Coronary artery disease  Father Eben         Premature    Other (cardiac disorder) Father Eben     Early natural death Father Eben     Heart attack Father Eben     Breast cancer Daughter Yany     Colon cancer Maternal Grandmother ELIDA LOVETT     Cancer Maternal Grandmother ELIDA LOVETT     Cancer Maternal Grandfather Pop Lovett        SHx:  Social History     Tobacco Use    Smoking status: Never     Passive exposure: Past    Smokeless tobacco: Never    Tobacco comments:     Grandparents and father smoked   Vaping Use    Vaping status: Never Used   Substance Use Topics    Alcohol use: Never    Drug use: Never       Allergies:  Allergies   Allergen Reactions    Nirmatrelvir-Ritonavir Nausea/vomiting     AKA.. Paxlovid         Assessment/Plan  Symptomatically doing well, urgency and frequency improved on Vesicare and Flomax. He can try to stop flomax, if symptoms worsen, he can resume it.     Follow up in 1 year.     Scribe Attestation  By signing my name below, Grecia CARROLL Scribe, attest that this documentation  has been prepared under the direction and in the presence of Adam Saunders MD.

## 2024-11-06 ENCOUNTER — TELEMEDICINE (OUTPATIENT)
Dept: UROLOGY | Facility: HOSPITAL | Age: 74
End: 2024-11-06
Payer: MEDICARE

## 2024-11-06 DIAGNOSIS — R35.0 URINARY FREQUENCY: ICD-10-CM

## 2024-11-06 DIAGNOSIS — N40.1 BENIGN PROSTATIC HYPERPLASIA WITH URINARY OBSTRUCTION: Primary | ICD-10-CM

## 2024-11-06 DIAGNOSIS — N13.8 BENIGN PROSTATIC HYPERPLASIA WITH URINARY OBSTRUCTION: Primary | ICD-10-CM

## 2024-11-06 PROCEDURE — 1157F ADVNC CARE PLAN IN RCRD: CPT | Performed by: UROLOGY

## 2024-11-06 PROCEDURE — 99214 OFFICE O/P EST MOD 30 MIN: CPT | Performed by: UROLOGY

## 2024-11-06 PROCEDURE — G2211 COMPLEX E/M VISIT ADD ON: HCPCS | Performed by: UROLOGY

## 2024-11-14 NOTE — PROGRESS NOTES
Patient ID: Elpidio Jamil is a 74 y.o. male who presents for Follow-up (Elpidio is here for a follow up visit, medication review, discuss his cough, and soreness he's been having with his ankles.)  LAST VISIT PLAN FROM: 07/02/2024:  Patient Instructions:  Schedule CT scan of the chest at your convenience.  Can be a little more liberal with salt use for lower bp.  Keep atenolol at 25 mg daily to control palpitations/histor of pvc's.   Discussion:If salt causes and issue or if bp stays low can consider cutting back to 1/2 tab of atenolol.  Flu shot, RSV shots covid update this fall.  Fasting blood test before next visit, approx October.  Instructions for obtaining lab tests:   You do not need a paper requisition when obtaining tests at a  facility. No appointment is needed for lab tests.  For fasting blood tests:  Please do not consume calories for 10-12 hours prior to this blood test. It is ok to drink water, you should be hydrated.  Please do not take vitamins, supplements or thyroid medication before your blood is drawn this day.   Most lab results should be available for your review on the  My Chart portal within 48 hours. Please contact the office if you have not received results within 2 weeks of obtaining the test.  Take 2 years of records from urology to Dr Chip tan.   Nighttime:  Zyrtec, tamsulosin, trazodone, atorvastatin, famotidine  Am : atenolol  Fluoxetine  Lovaza one am and 2 pm but not right at bedtime as it could cause heartburn if taking it right before bedtime. Or you can taek all 3 at once. We reduced it from 4 per day due to heartburn recurrence.  Metamucil is fine in the am but wait an hour before taking other medications.  Follow up in October   END LAST VISIT PLAN  -------------------------------------------  HPI  Patient is a 74-year-old male who presets today for follow up.    Since last visit, patient underwent CT Chest (see Objective).  We discussed his results.    Cough:  Esophageal  "Reflux:  Patient was on 40 mg famotidine from last December prescribed by Dr. Dove.  In October 2024, he saw Kylah Zhu NP at [facility] and was put on prevacid 30 mg once per day and famotidine was stopped.  He reports a big improvement in his cough.    Patient had an EGD on 10/16/2024 and had a stricture dilated.  He states his cough further improved after this.  Of note, biopsies were taken due to white exudate and to rule out Mcclain's esophagus.  The biopsy was positive for yeast.  He was treated with fluconazole 1 tablet (100 mg) daily for 14 days.  He still reports slight cough 3-4 times per day.  He states an 80% improvement. He does not feel it is an issue any longer.    BPH:  Last PSA was 0.25 on 10/10/2024. He saw Urology and was put on VESIcare along with Flomax.  After his November 6 visit, Urology stopped the Flomax and he has not noticed any difference yet.  Recommended monitoring as it has not been very long since stopping the Flomax.  He was also told that he no longer needs to check his PSA.  We discussed that I do not agree with that as he is still young enough that if we detect prostate cancer early it can be treated.  We will revisit when next PSA is due.    Fall:  Patient reports slipping and falling installing a grab bar in the shower.  Provided patient with the Fall Prevention Tip Sheet for home.    History of T-cell:  Resolved by dermatologist specialist and is no longer a problem.    Anxiety:  Depression:  Stable.  Patient continues on Prozac 50 mg daily.    Hyperlipidemia:  No myalgias, nausea, abdominal pain.  Meds: Taking regularly, no adverse effect.  Patient continues on atorvastatin 40 mg nightly.  Labs: Last LDL direct was 78 mg/dL with triglycerides at 178 mg/dL on 10/10/2024.  LDL goal: <99 mg/dL.  He had a coronary artery calcium score of 389 which falls within the\"Definite, moderate plaque\" category in the left main, LAD, and left circumflex artery.  " Interval increase from 53.85.  Fatty infiltration of the liver.    Left Achilles Pain:  Patient reports pain and swelling of his left achilles tendon, onset 6 months after jogging.  Denies pain while driving.  He states it will improve with rest.  He has not taken anything for the pain.  Recommended wearing an ankle brace (extended above area of swelling) when walking on it and ice for 15 minutes after activity.  Order placed for referral to Orthopedic Surgery, Dr. Miguel Ángel Mercado.    Iron Deficiency:  Last hemoglobin was 14.6 on 10/10/2024.    Elevated A1c:  Last glucose was 113 with A1c at 6.1% on 10/10/2024.    Elevated B12:  Last B12 was 2000 on 10/10/2024.  Patient states he bought the super dose supplement of 2000 mcg by a mistake.  Recommended just taking it twice per week.    Insomnia:  Patient states that he has been taking a full pill of trazodone 50 mg at night instead of half, 25 mg, as prescribed.  I updated his medication list to reflect this change.    Right Shoulder Pain:  He received a cortisone injection last year and was doing PT last fall.  He reports that his right shoulder still bothers him.  He was wondering about meloxicam.  We discussed that it has been too soon after his EGD to use meloxicam or any NSAID.  Recommended waiting at least 3 months and only for a short period use due to his history of GI issues.  Recommended Voltaren gel 3-4 times per day and Tylenol for pain at this time.    Orders placed for refills as required.    Follow up 03/19/2025.    Review of Systems  See ROS in HPI    Current Outpatient Medications   Medication Instructions    aspirin 81 mg, Daily    atenolol (TENORMIN) 12.5 mg, oral, Daily    atorvastatin (LIPITOR) 40 mg, oral, Nightly    CALCIUM CITRATE-VITAMIN D3 ORAL 1 tablet, Daily    cetirizine (ZYRTEC) 10 mg, oral, Nightly    cyanocobalamin, vitamin B-12, 1,000 mcg capsule 1 capsule, 5 times weekly    diclofenac sodium (VOLTAREN) 2 g, Topical, 3 times daily     FLUoxetine (PROzac) 40 mg capsule TAKE 1 CAPSULE BY MOUTH ONCE  DAILY WITH 10 MG TO MAKE 50 MG    FLUoxetine (PROZAC) 10 mg, oral, Daily, with 40mg to equal 50mg daily    fluticasone (Flonase) 50 mcg/actuation nasal spray 2 sprays, Each Nostril, 2 times daily    lansoprazole (Prevacid) 30 mg DR capsule Take one cap daily  30 minutes ac    levocetirizine (XYZAL) 5 mg, oral, Every evening    omega-3 acid ethyl esters (LOVAZA) 3 g, oral, Daily, Take one cap in the am. And 2 caps in the pm.    psyllium husk-calcium (Metamucil Plus Calcium) 1-60 gram-mg capsule 5 capsules, Daily    solifenacin (VESICARE) 5 mg, oral, Daily, Swallow tablet whole; do not crush, chew, or split.    traZODone (DESYREL) 50 mg, oral, Nightly     Allergies   Allergen Reactions    Nirmatrelvir-Ritonavir Nausea/vomiting     AKA.. Paxlovid     Objective    Results reviewed:   CT Chest 08/19/2024:  FINDINGS:  CHEST WALL/BASE OF THE NECK:  The chest wall was grossly intact.  No thyromegaly or thyroid mass.  LUNGS/ PLEURA/ AND TRACHEA:  Stable calcified granuloma in the posterolateral right upper lobe on  lung window image 90. Stable calcified granuloma in the perihilar  right middle lobe. Stable peripheral anterior inferior right middle  lobe calcified granuloma. Mild stable scarring at the posterior right  lung base. No mass or pneumonia in either lung. No pleural effusion.  No pneumothorax.  The trachea was grossly intact.  MEDIASTINUM/SMITH:  No suspicious mediastinal, hilar, or axillary mass or adenopathy in  this unenhanced exam. Stable calcified lymph nodes in the right hilum  and right subcarinal mediastinum. No cardiomegaly. Moderate coronary  artery calcifications involving left main, lad, and left circumflex  vessels. No pericardial effusion.  No thoracic aortic aneurysm.  BONES:  No destructive lytic or blastic bone lesion. Mild scattered mid and  distal thoracic spine disc space narrowing with endplate  osteophytosis.  UPPER  ABDOMEN:  Stable calcified splenic granulomas. Otherwise,  the imaged upper  abdomen was grossly intact.  IMPRESSION:  Stable findings of prior granulomatous disease as described.  Mild stable scarring at the posterior right lung base.  Moderate three-vessel left-sided coronary artery calcifications.  MACRO:  None  Signed by: Philip Pedroza 8/21/2024 10:20 AM  Dictation workstation:   [id number]    Latest Complete Lab Results:  CBC:   Lab Results   Component Value Date    WBC 9.0 10/10/2024    RBC 4.75 10/10/2024    HGB 14.6 10/10/2024    HCT 43.7 10/10/2024    MCV 92 10/10/2024    MCH 30.7 10/10/2024    MCHC 33.4 10/10/2024     10/10/2024    MPV 9.1 10/05/2023     CMP:  Lab Results   Component Value Date    GLUCOSE 113 (H) 10/10/2024     10/10/2024    K 4.2 10/10/2024     10/10/2024    CO2 31 10/10/2024    ANIONGAP 12 10/10/2024    BUN 12 10/10/2024    CREATININE 0.80 10/10/2024    CALCIUM 9.8 10/10/2024    ALBUMIN 4.8 10/10/2024    ALKPHOS 53 10/10/2024    PROT 7.4 10/10/2024    AST 17 10/10/2024    BILITOT 0.9 10/10/2024    ALT 24 10/10/2024      Lipid:   Lab Results   Component Value Date    CHOL 139 10/10/2024    HDL 39.6 10/10/2024    CHHDL 3.5 10/10/2024    LDLF 61 03/09/2023    VLDL 36 10/10/2024    TRIG 178 (H) 10/10/2024     LDL Direct:  Lab Results   Component Value Date    LDLDIRECT 78 10/10/2024      TSH:   Lab Results   Component Value Date    TSH 1.16 10/10/2024      B12:  Lab Results   Component Value Date    ZXBIQNFQ98 >2,000 (H) 10/10/2024     Vitamin D:  Lab Results   Component Value Date    VITD25 39 07/26/2021      HgA1c:   Lab Results   Component Value Date    HGBA1C 6.1 (H) 10/10/2024    FQKZTFYV0N 128 10/10/2024     PSA:   Lab Results   Component Value Date    PSA 0.25 10/10/2024        Physical Exam  Vitals:      2/26/2024     1:49 PM 6/17/2024     1:28 PM 7/2/2024     2:05 PM 7/2/2024     3:25 PM 10/8/2024    11:54 AM 11/19/2024     3:56 PM 12/5/2024    12:59 PM   Vitals  "  Systolic 102  92 106 94 110 130   Diastolic 62  54 70 63 63 80   BP Location Right arm  Left arm  Right arm     Heart Rate   60  62 55 58   Temp       36.2 °C (97.2 °F)   Resp 18  18   16    Height 1.765 m (5' 9.5\") 1.778 m (5' 10\") 1.778 m (5' 10\")  1.778 m (5' 10\") 1.778 m (5' 10\") 1.778 m (5' 10\")   Weight (lb) 198 195 195  197 196.8 196   BMI 28.82 kg/m2 27.98 kg/m2 27.98 kg/m2  28.27 kg/m2 28.24 kg/m2 28.12 kg/m2   BSA (m2) 2.1 m2 2.09 m2 2.09 m2  2.1 m2 2.1 m2 2.1 m2   Visit Report Report Report Report Report Report Report Report     Patient looks well and is in no obvious distress.  HEENT:   Normocephalic, no facial asymmetry  Eyes: Sclera and conjunctiva are clear.  Neck: No adenopathy cervical (Ant/post/lat), no Supraclavicular nodes.   Thyroid normal.  Carotid pulses normal, no carotid bruits.  Lungs : RR normal. Clear to auscultation anterior, posterior and lateral. No rales, wheezes rhonchi or rubs. Good air exchange.  Heart: RRR. No Murmur, gallop, click or rub.  Abdomen: Bowel sounds normal, No bruits. No pulsatile mass. No hepatosplenomegaly, masses or tenderness. Soft, no guarding.  Vascular:  Posterior tibialis and dorsalis pedis pulses within normal limits bilaterally.   Extremities: No upper extremity edema. No lower extremity edema.   Musculoskeletal: No synovitis of joints seen. No new deformity.  Mild swelling along the sides of the achilles tendon on the left.  It is not present on the right.  Neuro: CN 2-12 intact. Alert, appropriate.  Ambulates independently.  No gross motor deficit.   No tremors.  Psych: normal mood and affect.  Skin: No rash, bruising petechiae or jaundice.     him to be a fall risk. Htn stable  Cough much improved with gerd tx.  Has gi follow up  Mood disorder stable.  Referral for achilles tendon issue.  I am the Internal Medicine physician providing ongoing chronic medical care for this patient, which is managed during and in between office visits.      See " patient instructions in wrap up plan, orders and comments for treatment plan.  Patient Instructions:  Medication changes by specialists noted, agree.  B12-high due to purchasing a higher than usual dose-can take that twice per week to use it up or get the 06970 mcg again and take daily.    Right shoulder, can try voltaren gel 2-4 grams three times per day and oral tylenol. Would not use oral meloxicam or other nsaid due to recent esophagus issues, for at least 3 more months. If not better, suggest a visit back with Dr Austin Polanco.    Left ankle: voltaren gel 2 grams three times per day . Ice after activity, agree with ankle support until you see ortho.Referral to Dr Miguel Ángel Mercado for left achilles, if no availability in near future we could refer to Dr Sami Rayo [facility], has a [facility] office.      Follow up 4 months, sooner if needed.      Ways to Help Prevent Falls at Home--avoid putting up grab bars :).    Quick Tips   · Ask for help if you need it. Most people want to help!   · Get up slowly after sitting or laying down   · Wear a medical alert device or keep cell phone in your pocket   · Use night lights, especially areas near a bathroom   · Keep the items you use often within reach on a small stool or end table   · Use an assistive device such as walker or cane, as directed by provider/physical therapy   · Use a non-slip mat and grab bars in your bathroom. Look for home health sections for best options     Other Areas to Focus On   · Exercise and nutrition: Regular exercise or taking a falls prevention class are great ways improve strength and balance. Don't forget to stay hydrated and bring a snack!   · Medicine side effects: Some medicines can make you sleepy or dizzy, which could cause a fall. Ask your healthcare provider about the side effects your medicines could cause. Be sure to let them know if you take any vitamins or supplements as well.   · Tripping hazards: Remove items you could trip  on, such as loose mats, rugs, cords, and clutter. Wear closed toe shoes with rubber soles.   · Health and wellness: Get regular checkups with your healthcare provider, plus routine vision and hearing screenings. Talk with your healthcare provider about:   o Your medicines and the possible side effects - bring them in a bag if that is easier!   o Problems with balance or feeling dizzy   o Ways to promote bone health, such as Vitamin D and calcium supplements   o Questions or concerns about falling     *Ask your healthcare team if you have questions     [facility], 2022   Scribe Attestation  By signing my name below, Tess CARROLL, [place]   attest that this documentation has been prepared under the direction and in the presence of Katiuska Helm MD.

## 2024-11-19 ENCOUNTER — APPOINTMENT (OUTPATIENT)
Dept: PRIMARY CARE | Facility: CLINIC | Age: 74
End: 2024-11-19
Payer: MEDICARE

## 2024-11-19 VITALS
BODY MASS INDEX: 28.18 KG/M2 | OXYGEN SATURATION: 95 % | HEIGHT: 70 IN | DIASTOLIC BLOOD PRESSURE: 63 MMHG | WEIGHT: 196.8 LBS | HEART RATE: 55 BPM | RESPIRATION RATE: 16 BRPM | SYSTOLIC BLOOD PRESSURE: 110 MMHG

## 2024-11-19 DIAGNOSIS — F41.9 ANXIETY DISORDER, UNSPECIFIED TYPE: ICD-10-CM

## 2024-11-19 DIAGNOSIS — I25.10 CALCIFICATION OF CORONARY ARTERY: ICD-10-CM

## 2024-11-19 DIAGNOSIS — B37.81 CANDIDIASIS OF ESOPHAGUS (MULTI): ICD-10-CM

## 2024-11-19 DIAGNOSIS — E78.00 HYPERCHOLESTEROLEMIA: ICD-10-CM

## 2024-11-19 DIAGNOSIS — H34.8112 CENTRAL RETINAL VEIN OCCLUSION, RIGHT EYE, STABLE (CMS-HCC): ICD-10-CM

## 2024-11-19 DIAGNOSIS — R93.1 AGATSTON CORONARY ARTERY CALCIUM SCORE BETWEEN 200 AND 399: ICD-10-CM

## 2024-11-19 DIAGNOSIS — M76.62 ACHILLES TENDINITIS OF LEFT LOWER EXTREMITY: Primary | ICD-10-CM

## 2024-11-19 DIAGNOSIS — R05.3 CHRONIC COUGH: ICD-10-CM

## 2024-11-19 DIAGNOSIS — Z87.19 HISTORY OF ESOPHAGEAL STRICTURE: ICD-10-CM

## 2024-11-19 DIAGNOSIS — R35.0 URINARY FREQUENCY: ICD-10-CM

## 2024-11-19 DIAGNOSIS — F32.0 DEPRESSION, MAJOR, SINGLE EPISODE, MILD (CMS-HCC): ICD-10-CM

## 2024-11-19 DIAGNOSIS — K21.9 GASTROESOPHAGEAL REFLUX DISEASE, UNSPECIFIED WHETHER ESOPHAGITIS PRESENT: ICD-10-CM

## 2024-11-19 PROBLEM — E61.1 IRON DEFICIENCY: Status: RESOLVED | Noted: 2024-09-16 | Resolved: 2024-11-19

## 2024-11-19 PROBLEM — M75.51 BURSITIS OF RIGHT SHOULDER: Status: RESOLVED | Noted: 2023-03-14 | Resolved: 2024-11-19

## 2024-11-19 PROBLEM — G56.22 NEURITIS OF LEFT ULNAR NERVE: Status: RESOLVED | Noted: 2023-01-31 | Resolved: 2024-11-19

## 2024-11-19 PROBLEM — M25.511 RIGHT SHOULDER PAIN: Status: RESOLVED | Noted: 2023-10-02 | Resolved: 2024-11-19

## 2024-11-19 PROCEDURE — 99215 OFFICE O/P EST HI 40 MIN: CPT | Performed by: INTERNAL MEDICINE

## 2024-11-19 PROCEDURE — 1157F ADVNC CARE PLAN IN RCRD: CPT | Performed by: INTERNAL MEDICINE

## 2024-11-19 PROCEDURE — G2211 COMPLEX E/M VISIT ADD ON: HCPCS | Performed by: INTERNAL MEDICINE

## 2024-11-19 PROCEDURE — 1126F AMNT PAIN NOTED NONE PRSNT: CPT | Performed by: INTERNAL MEDICINE

## 2024-11-19 PROCEDURE — 3008F BODY MASS INDEX DOCD: CPT | Performed by: INTERNAL MEDICINE

## 2024-11-19 PROCEDURE — 1160F RVW MEDS BY RX/DR IN RCRD: CPT | Performed by: INTERNAL MEDICINE

## 2024-11-19 PROCEDURE — 1159F MED LIST DOCD IN RCRD: CPT | Performed by: INTERNAL MEDICINE

## 2024-11-19 RX ORDER — DICLOFENAC SODIUM 10 MG/G
2 GEL TOPICAL 3 TIMES DAILY
Start: 2024-11-19

## 2024-11-19 RX ORDER — TRAZODONE HYDROCHLORIDE 50 MG/1
50 TABLET ORAL NIGHTLY
Status: SHIPPED
Start: 2024-11-19 | End: 2024-11-19 | Stop reason: SDUPTHER

## 2024-11-19 RX ORDER — TRAZODONE HYDROCHLORIDE 50 MG/1
50 TABLET ORAL NIGHTLY
Qty: 90 TABLET | Refills: 1 | Status: SHIPPED | OUTPATIENT
Start: 2024-11-19

## 2024-11-19 RX ORDER — LANSOPRAZOLE 30 MG/1
CAPSULE, DELAYED RELEASE ORAL
Qty: 90 CAPSULE | Refills: 3 | Status: SHIPPED | OUTPATIENT
Start: 2024-11-19

## 2024-11-19 ASSESSMENT — PAIN SCALES - GENERAL: PAINLEVEL_OUTOF10: 0-NO PAIN

## 2024-11-19 ASSESSMENT — PATIENT HEALTH QUESTIONNAIRE - PHQ9
SUM OF ALL RESPONSES TO PHQ9 QUESTIONS 1 AND 2: 0
1. LITTLE INTEREST OR PLEASURE IN DOING THINGS: NOT AT ALL
2. FEELING DOWN, DEPRESSED OR HOPELESS: NOT AT ALL

## 2024-11-19 NOTE — PATIENT INSTRUCTIONS
Medication changes by specialists noted, agree.  B12-high due to purchasing a higher than usual dose-can take that twice per week to use it up or get the 98829 mcg again and take daily.    Right shoulder, can try voltaren gel 2-4 grams three times per day and oral tylenol. Would not use oral meloxicam or other nsaid due to recent esophagus issues, for at least 3 more months. If not better, suggest a visit back with Dr Austin Polanco.    Left ankle: voltaren gel 2 grams three times per day . Ice after activity, agree with ankle support until you see ortho.Referral to Dr Miguel Ángel Mercado for left achilles, if no availability in near future we could refer to Dr Sami Rayo [facility], has a [facility] office.      Follow up 4 months, sooner if needed.      Ways to Help Prevent Falls at Home--avoid putting up grab bars :).    Quick Tips   · Ask for help if you need it. Most people want to help!   · Get up slowly after sitting or laying down   · Wear a medical alert device or keep cell phone in your pocket   · Use night lights, especially areas near a bathroom   · Keep the items you use often within reach on a small stool or end table   · Use an assistive device such as walker or cane, as directed by provider/physical therapy   · Use a non-slip mat and grab bars in your bathroom. Look for home health sections for best options     Other Areas to Focus On   · Exercise and nutrition: Regular exercise or taking a falls prevention class are great ways improve strength and balance. Don't forget to stay hydrated and bring a snack!   · Medicine side effects: Some medicines can make you sleepy or dizzy, which could cause a fall. Ask your healthcare provider about the side effects your medicines could cause. Be sure to let them know if you take any vitamins or supplements as well.   · Tripping hazards: Remove items you could trip on, such as loose mats, rugs, cords, and clutter. Wear closed toe shoes with rubber soles.   · Health  and wellness: Get regular checkups with your healthcare provider, plus routine vision and hearing screenings. Talk with your healthcare provider about:   o Your medicines and the possible side effects - bring them in a bag if that is easier!   o Problems with balance or feeling dizzy   o Ways to promote bone health, such as Vitamin D and calcium supplements   o Questions or concerns about falling     *Ask your healthcare team if you have questions     [facility], 2022

## 2024-11-25 DIAGNOSIS — R05.3 CHRONIC COUGH: Primary | ICD-10-CM

## 2024-11-27 NOTE — PROGRESS NOTES
Patient: Elpidio Jamil    [id number]  : 1950 -- AGE 74 y.o.    Provider: ORLIN Enamorado     Location [facility]   Service Date: 2024              [facility]  New Visit Note      HISTORY OF PRESENT ILLNESS     The patient's referring provider is: Katiuska Helm MD    HISTORY OF PRESENT ILLNESS   Elpidio Jamil is a 74 y.o. male who presents to a [facility] for an evaluation with concerns of Cough. I have independently interviewed and examined the patient in the office and reviewed available records.    Current History    On today's visit, the patient reports intermittent cough that is worst in the morning, sometimes at night but not awakening him, not really having sputum to expectorate, this started a couple years. He exercises on the [employer] - he can walk/jog a couple miles without dyspnea Denies dyspnea on exertion at rest.  Currently sits for most of the day, works inside the house, but does not carry loads and do strenuous exercise. They are active in her everyday life and carries loads and does strenuous exercise. He is only troubled by breathlessness except on strenuous exercise (mMRC 0).  He was a  - he feels it interferes with talking. Denies throat clearing. C/o nasal congestion. Has sinus headaches affected with the weather changes. Denies orthopnea, pnd, or feliz.   Weight has been mostly stable. Denies  , wheezing, and denies green, blood streaks, but no sputum. No night cough. No hemoptysis. No fever or shivering chills. . Also complains of heartburn- on PPI and had EGD and is better. Denies chest pain   He is taking flonase twice a day and takes zyrtec     Previous pulmonary history:  no history of recurrent infections, or lung disease as a child.  No previous lung hx, never on oxygen or inhaler therapy.     Inhalers/nebulized medications: none    Hospitalization History: Not been  hospitalized over the last year for breathing related problem.    Sleep history:  Has IVONNE - on CPAP - managed by Dr. Tang     ALLERGIES AND MEDICATIONS     ALLERGIES  Allergies   Allergen Reactions    Nirmatrelvir-Ritonavir Nausea/vomiting     AKA.. Paxlovid       MEDICATIONS  Current Outpatient Medications   Medication Sig Dispense Refill    aspirin 81 mg EC tablet Take 1 tablet (81 mg) by mouth once daily. with food usually in the am      atenolol (Tenormin) 25 mg tablet Take 0.5 tablets (12.5 mg) by mouth once daily. 45 tablet 3    atorvastatin (Lipitor) 40 mg tablet TAKE 1 TABLET BY MOUTH ONCE  DAILY AT BEDTIME 90 tablet 3    CALCIUM CITRATE-VITAMIN D3 ORAL Take 1 tablet by mouth 1 (one) time each day.      cetirizine (ZYRTEC) 10 mg capsule Take 1 capsule (10 mg) by mouth once daily at bedtime. 90 capsule 3    cyanocobalamin, vitamin B-12, 1,000 mcg capsule Take 1 capsule by mouth 5 (five) times a week.      diclofenac sodium (Voltaren) 1 % gel Apply 2.25 inches (2 g) topically 3 times a day.      FLUoxetine (PROzac) 10 mg capsule Take 1 capsule (10 mg) by mouth once daily. with 40mg to equal 50mg daily 90 capsule 3    FLUoxetine (PROzac) 40 mg capsule TAKE 1 CAPSULE BY MOUTH ONCE  DAILY WITH 10 MG TO MAKE 50 MG 90 capsule 3    fluticasone (Flonase) 50 mcg/actuation nasal spray Administer 2 sprays into each nostril 2 times a day. 64 g 3    lansoprazole (Prevacid) 30 mg DR capsule Take one cap daily  30 minutes ac 90 capsule 3    omega-3 acid ethyl esters (Lovaza) 1 gram capsule Take 3 capsules (3 g) by mouth once daily. Take one cap in the am. And 2 caps in the pm.      psyllium husk-calcium (Metamucil Plus Calcium) 1-60 gram-mg capsule Take 5 capsules by mouth 1 (one) time each day.      solifenacin (VESIcare) 5 mg tablet Take 1 tablet (5 mg) by mouth once daily. Swallow tablet whole; do not crush, chew, or split. 90 tablet 3    traZODone (Desyrel) 50 mg tablet Take 1 tablet (50 mg) by mouth once daily at  bedtime. 90 tablet 1     No current facility-administered medications for this visit.         PAST HISTORY     PAST MEDICAL HISTORY  PVC's  Anemia  IVONNE  Depression/anxiety      PAST SURGICAL HISTORY  Past Surgical History:   Procedure Laterality Date    CIRCUMCISION, PRIMARY      COLONOSCOPY  04/26/2012    Colonoscopy (Fiberoptic)    ESOPHAGOGASTRODUODENOSCOPY  01/08/2015    Diagnostic Esophagogastroduodenoscopy    EYE SURGERY      HERNIA REPAIR  06/29/2015    Hernia Repair    HERNIA REPAIR  04/26/2012    Inguinal Hernia Repair    OTHER SURGICAL HISTORY  02/12/2019    Myringotomy    OTHER SURGICAL HISTORY  01/08/2015    Repair Of Esophageal Stricture    OTHER SURGICAL HISTORY  12/07/2021    Skin lesion excision    OTHER SURGICAL HISTORY  12/09/2021    Mohs surgery    OTHER SURGICAL HISTORY  04/26/2012    Evaluation Of Swallowing And Oral Function    OTHER SURGICAL HISTORY  04/26/2012    Chest Tube Insertion       IMMUNIZATION HISTORY  Immunization History   Administered Date(s) Administered    Flu vaccine (IIV4), preservative free *Check age/dose* 10/14/2016    Flu vaccine, quadrivalent, high-dose, preservative free, age 65y+ (FLUZONE) 10/18/2023    Flu vaccine, trivalent, preservative free, HIGH-DOSE, age 65y+ (Fluzone) 10/09/2024    Hep A / Hep B 05/17/2005, 06/17/2005, 06/06/2006    Hep A, Unspecified 05/17/2005, 06/17/2005, 06/06/2006    Hep B, Unspecified 05/17/2005, 06/17/2005, 06/06/2006    Influenza, Seasonal, Quadrivalent, Adjuvanted 10/10/2021, 10/07/2022    Influenza, Unspecified 10/05/2017, 09/26/2018, 11/21/2019, 10/06/2020    Influenza, seasonal, injectable 12/01/2009, 12/16/2010, 09/11/2012, 10/01/2013, 09/23/2014, 10/08/2015    Moderna COVID-19 vaccine, 12 years and older (50mcg/0.5mL)(Spikevax) 10/09/2024    Moderna SARS-CoV-2 Booster 10/07/2022    Moderna SARS-CoV-2 Vaccination 03/06/2021, 04/03/2021, 12/08/2021    Novel influenza-H1N1-09, preservative-free 10/29/2009    Pfizer COVID-19 vaccine, 12  years and older, (30mcg/0.3mL) (Comirnaty) 11/16/2023    Pneumococcal conjugate vaccine, 13-valent (PREVNAR 13) 03/02/2018    Pneumococcal polysaccharide vaccine, 23-valent, age 2 years and older (PNEUMOVAX 23) 04/23/2019    Td (adult), unspecified 01/01/2005    Tdap vaccine, age 7 year and older (BOOSTRIX, ADACEL) 06/06/2006, 06/28/2017    Tetanus toxoid, adsorbed 06/06/2006    Typhoid, Parenteral 05/17/2005    Typhoid, Unspecified 05/17/2005    Zoster vaccine, recombinant, adult (SHINGRIX) 05/14/2019, 08/22/2019    Zoster, Unspecified 09/01/2017       SOCIAL HISTORY  He  reports that he has never smoked. He has been exposed to tobacco smoke. He has never used smokeless tobacco. He reports that he does not drink alcohol and does not use drugs. He Patient     OCCUPATIONAL/ENVIRONMENTAL HISTORY  Previously worked as:   DOES/DOES NOT EC: does not have known exposure to asbestos, silica, beryllium or inhaled metals.  DOES/DOES NOT EC: does not have exposure to birds or exotic animals. Has a dog     FAMILY HISTORY  Family History   Problem Relation Name Age of Onset    COPD Mother Jennifer     Dementia Mother Jennifer     Hyperlipidemia Mother Jennifer     Coronary artery disease Father Eben         Premature    Other (cardiac disorder) Father Eben     Early natural death Father Eben     Heart attack Father Eben     Breast cancer Daughter Yany     Colon cancer Maternal Grandmother ELIDA LOVETT     Cancer Maternal Grandmother ELIDA LOVETT     Cancer Maternal Grandfather Pop Lovett      DOES/DOES NOT EC: does have a family history of pulmonary disease. Father smoked   DOES/DOES NOT EC: does have a family history of cancer.  DOES/DOES NOT EC: does not have a family history of autoimmune disorders.    RESULTS/DATA     Pulmonary Function Test Results     PFT scheduled 12/2/2024    Chest Radiograph     XR chest 2 view 08/30/2022      Impression  Small right pleural effusion with associated atelectasis  and/or  pneumonitis.      Chest CT Scan     CT CHEST WO IV CONTRAST;  8/19/2024           TECHNIQUE:  Noncontrast Thin-section images were obtained  from the thoracic  inlet down through the diaphragm. Sagittal and coronal reconstruction  images were generated. Mediastinal, lung, bone, and liver windows  were reviewed.      COMPARISON:  CT scan chest from 09/02/2022, CT coronary artery calcium score exam  from 08/30/2021, and CT scan chest from 02/22/2016.      FINDINGS:  CHEST WALL/BASE OF THE NECK:  The chest wall was grossly intact.  No thyromegaly or thyroid mass.      LUNGS/ PLEURA/ AND TRACHEA:  Stable calcified granuloma in the posterolateral right upper lobe on  lung window image 90. Stable calcified granuloma in the perihilar  right middle lobe. Stable peripheral anterior inferior right middle  lobe calcified granuloma. Mild stable scarring at the posterior right  lung base. No mass or pneumonia in either lung. No pleural effusion.  No pneumothorax.  The trachea was grossly intact.      MEDIASTINUM/SMITH:  No suspicious mediastinal, hilar, or axillary mass or adenopathy in  this unenhanced exam. Stable calcified lymph nodes in the right hilum  and right subcarinal mediastinum. No cardiomegaly. Moderate coronary  artery calcifications involving left main, lad, and left circumflex  vessels. No pericardial effusion.  No thoracic aortic aneurysm.      BONES:  No destructive lytic or blastic bone lesion. Mild scattered mid and  distal thoracic spine disc space narrowing with endplate  osteophytosis.      UPPER ABDOMEN:  Stable calcified splenic granulomas. Otherwise,  the imaged upper  abdomen was grossly intact.      IMPRESSION:  Stable findings of prior granulomatous disease as described.      Mild stable scarring at the posterior right lung base.      Moderate three-vessel left-sided coronary artery calcifications.      Echocardiogram     No testing done          REVIEW OF SYSTEMS     REVIEW OF SYSTEMS  Review  "of Systems   HENT:  Positive for postnasal drip, sinus pressure and sinus pain.    Respiratory:  Positive for cough.    All other systems reviewed and are negative.        PHYSICAL EXAM     VITAL SIGNS: /80   Pulse 58   Temp 36.2 °C (97.2 °F) (Temporal)   Ht 1.778 m (5' 10\")   Wt 88.9 kg (196 lb)   SpO2 95%   BMI 28.12 kg/m²      CURRENT WEIGHT: [unfilled]  BMI: [unfilled]  PREVIOUS WEIGHTS:  Wt Readings from Last 3 Encounters:   12/05/24 88.9 kg (196 lb)   11/19/24 89.3 kg (196 lb 12.8 oz)   10/08/24 89.4 kg (197 lb)       Physical Exam  Constitutional:       Appearance: Normal appearance.   HENT:      Head: Normocephalic and atraumatic.      Right Ear: External ear normal.      Left Ear: External ear normal.      Nose: Nose normal.      Mouth/Throat:      Mouth: Mucous membranes are moist.      Pharynx: Oropharynx is clear.      Comments: Raspy voice   Eyes:      Extraocular Movements: Extraocular movements intact.      Conjunctiva/sclera: Conjunctivae normal.      Pupils: Pupils are equal, round, and reactive to light.   Cardiovascular:      Rate and Rhythm: Normal rate and regular rhythm.      Pulses: Normal pulses.      Heart sounds: Normal heart sounds.   Pulmonary:      Effort: Pulmonary effort is normal.      Breath sounds: Normal breath sounds.   Abdominal:      General: Bowel sounds are normal.      Palpations: Abdomen is soft.   Musculoskeletal:         General: Normal range of motion.      Cervical back: Normal range of motion and neck supple.   Skin:     General: Skin is warm and dry.   Neurological:      General: No focal deficit present.      Mental Status: He is alert and oriented to person, place, and time. Mental status is at baseline.   Psychiatric:         Mood and Affect: Mood normal.         Behavior: Behavior normal.         Thought Content: Thought content normal.         Judgment: Judgment normal.         ASSESSMENT/PLAN     Mr. Jamil is a 74 y.o. male and  has a past medical " history of Abdominal distension (gaseous) (03/23/2022), Abnormal findings on diagnostic imaging of heart and coronary circulation, Acute upper respiratory infection, unspecified (08/03/2022), Anemia, Anxiety, Arrhythmia, Arthritis, Benign neoplasm of colon, unspecified (12/11/2012), Benign paroxysmal vertigo, unspecified ear (03/01/2017), Benign prostatic hyperplasia without lower urinary tract symptoms (08/05/2021), Body mass index (BMI) 27.0-27.9, adult (08/22/2019), Body mass index (BMI) 28.0-28.9, adult (10/28/2021), Cataract, COVID-19 (11/10/2022), COVID-19 virus infection (03/14/2023), Eczema, Encounter for follow-up examination after completed treatment for conditions other than malignant neoplasm (08/29/2018), Encounter for immunization (08/22/2019), Encounter for immunization (06/28/2017), Encounter for screening for malignant neoplasm of prostate (11/22/2014), Encounter for screening for malignant neoplasm of prostate (07/08/2018), Encounter for screening for other viral diseases (08/05/2021), GERD (gastroesophageal reflux disease), Impacted cerumen, bilateral (09/10/2020), Low back pain, unspecified (01/11/2022), Muscle spasm of back (01/11/2022), Other conditions influencing health status, Other enthesopathies, not elsewhere classified (03/09/2015), Other enthesopathy of right foot and ankle (12/07/2021), Other fecal abnormalities (03/09/2015), Other muscle spasm (09/04/2018), Other prurigo (04/06/2021), Other skin changes, Other skin changes due to chronic exposure to nonionizing radiation (03/29/2016), Other specified symptoms and signs involving the circulatory and respiratory systems (11/10/2022), Pain in left knee (09/03/2021), Pain in right knee (12/01/2020), Palpitations, Palpitations (01/31/2023), Personal history of colonic polyps, Personal history of diseases of the blood and blood-forming organs and certain disorders involving the immune mechanism (11/20/2013), Personal history of diseases of  the skin and subcutaneous tissue, Personal history of diseases of the skin and subcutaneous tissue (10/28/2021), Personal history of other diseases of the circulatory system, Personal history of other diseases of the digestive system, Personal history of other diseases of the digestive system (02/14/2019), Personal history of other diseases of the respiratory system (06/07/2019), Personal history of other diseases of the respiratory system (11/10/2022), Personal history of other drug therapy (05/21/2018), Personal history of other drug therapy (10/14/2016), Personal history of other endocrine, nutritional and metabolic disease, Personal history of other endocrine, nutritional and metabolic disease (08/22/2019), Personal history of other medical treatment, Personal history of other specified conditions (09/04/2018), Personal history of other specified conditions (06/08/2021), Personal history of other specified conditions (05/14/2019), Personal history of other specified conditions (02/12/2019), Personal history of other specified conditions (10/08/2015), Personal history of other specified conditions (11/10/2022), Personal history of pneumonia (recurrent) (11/10/2022), Personal history of pneumonia (recurrent), Pleural effusion (03/14/2023), Pneumonia (03/14/2023), Pruritus ani (08/23/2016), Pyuria (08/22/2019), Rales (03/14/2023), Shingles, Sleep apnea, Superficial mycosis, unspecified (10/08/2015), Unspecified abdominal hernia without obstruction or gangrene, Varicella, Ventricular premature depolarization (10/19/2021), Wheezing (03/14/2023), and Zoster without complications (08/10/2018). He was referred to the [facility] for evaluation of cough and allergic rhinitis     Problem List and Orders      Assessment and Plan / Recommendations:  Problem List Items Addressed This Visit       Chronic cough      Cough  Normal spirometry, No bronchodilator response. Lung volumes indicate a  mild restrictive defect. Diffusion capacity reduced    Eosinophils 550 - May 2024   - will proceed to methacholine due to elevated eosinophils   - refer to ENT for sinus and throat evaluation     GERD - controlled, had recent EGD    Pulm granulomas seen on CT Chest 8/2024   _ JOSIE negative     Had perforated esophagus in 1994 and has known scar tisues Stable findings of prior granulomatous disease -Mild stable scarring at the posterior right lung base.    -Moderate three-vessel left-sided coronary artery calcifications.    allergic rhinitis  - purchase nasal saline over the counter - use 2-3 x per day to rinse out nasal passages and keep them moist   - fluticasone (flonase) 1 spray each nostril 2 x per day - remember to aim towards your ear   - cont cetrizine daily at am and xyzal in the at night   - check RAST and I Ge       Thank you for visiting the Pulmonary clinic today!       Return to clinic after 4-6  weeks and  or sooner if needed   Lisa Berry CNP  My office number is [phone] -     Call to schedule  for radiology - CT scans/PFTs etc at  [phone]  General scheduling  [phone]     Best way to get a hold of me is to call my office --> Please do not send me follow my health messages  Any test results will be discussed at next visit -- please make sure to make a follow up appt after testing.

## 2024-12-02 ENCOUNTER — APPOINTMENT (OUTPATIENT)
Dept: RESPIRATORY THERAPY | Facility: HOSPITAL | Age: 74
End: 2024-12-02
Payer: MEDICARE

## 2024-12-02 ENCOUNTER — HOSPITAL ENCOUNTER (OUTPATIENT)
Dept: RESPIRATORY THERAPY | Facility: HOSPITAL | Age: 74
Discharge: HOME | End: 2024-12-02
Payer: MEDICARE

## 2024-12-02 DIAGNOSIS — R05.3 CHRONIC COUGH: ICD-10-CM

## 2024-12-02 PROCEDURE — 94729 DIFFUSING CAPACITY: CPT | Performed by: STUDENT IN AN ORGANIZED HEALTH CARE EDUCATION/TRAINING PROGRAM

## 2024-12-02 PROCEDURE — 94726 PLETHYSMOGRAPHY LUNG VOLUMES: CPT

## 2024-12-02 PROCEDURE — 94726 PLETHYSMOGRAPHY LUNG VOLUMES: CPT | Performed by: STUDENT IN AN ORGANIZED HEALTH CARE EDUCATION/TRAINING PROGRAM

## 2024-12-02 PROCEDURE — 94060 EVALUATION OF WHEEZING: CPT | Performed by: STUDENT IN AN ORGANIZED HEALTH CARE EDUCATION/TRAINING PROGRAM

## 2024-12-03 LAB
MGC ASCENT PFT - FEV1 - POST: 2.91
MGC ASCENT PFT - FEV1 - PRE: 2.73
MGC ASCENT PFT - FEV1 - PREDICTED: 2.98
MGC ASCENT PFT - FVC - POST: 3.59
MGC ASCENT PFT - FVC - PRE: 3.47
MGC ASCENT PFT - FVC - PREDICTED: 3.98

## 2024-12-05 ENCOUNTER — APPOINTMENT (OUTPATIENT)
Dept: PULMONOLOGY | Facility: CLINIC | Age: 74
End: 2024-12-05
Payer: MEDICARE

## 2024-12-05 VITALS
SYSTOLIC BLOOD PRESSURE: 130 MMHG | WEIGHT: 196 LBS | TEMPERATURE: 97.2 F | BODY MASS INDEX: 28.06 KG/M2 | DIASTOLIC BLOOD PRESSURE: 80 MMHG | OXYGEN SATURATION: 95 % | HEIGHT: 70 IN | HEART RATE: 58 BPM

## 2024-12-05 DIAGNOSIS — J30.89 ALLERGIC RHINITIS DUE TO OTHER ALLERGIC TRIGGER, UNSPECIFIED SEASONALITY: Primary | ICD-10-CM

## 2024-12-05 DIAGNOSIS — R05.3 CHRONIC COUGH: ICD-10-CM

## 2024-12-05 PROCEDURE — 99204 OFFICE O/P NEW MOD 45 MIN: CPT | Performed by: NURSE PRACTITIONER

## 2024-12-05 PROCEDURE — 1157F ADVNC CARE PLAN IN RCRD: CPT | Performed by: NURSE PRACTITIONER

## 2024-12-05 PROCEDURE — 1036F TOBACCO NON-USER: CPT | Performed by: NURSE PRACTITIONER

## 2024-12-05 PROCEDURE — 3008F BODY MASS INDEX DOCD: CPT | Performed by: NURSE PRACTITIONER

## 2024-12-05 PROCEDURE — 1126F AMNT PAIN NOTED NONE PRSNT: CPT | Performed by: NURSE PRACTITIONER

## 2024-12-05 PROCEDURE — 1159F MED LIST DOCD IN RCRD: CPT | Performed by: NURSE PRACTITIONER

## 2024-12-05 RX ORDER — METHACHOLINE CHLORIDE 3 MG/3 ML
3 VIAL, NEBULIZER (ML) INHALATION ONCE AS NEEDED
OUTPATIENT
Start: 2024-12-05

## 2024-12-05 RX ORDER — ALBUTEROL SULFATE 90 UG/1
4 INHALANT RESPIRATORY (INHALATION) EVERY 10 MIN PRN
OUTPATIENT
Start: 2024-12-05

## 2024-12-05 RX ORDER — LEVOCETIRIZINE DIHYDROCHLORIDE 5 MG/1
5 TABLET, FILM COATED ORAL EVERY EVENING
Qty: 30 TABLET | Refills: 11 | Status: SHIPPED | OUTPATIENT
Start: 2024-12-05 | End: 2025-12-05

## 2024-12-05 RX ORDER — METHACHOLINE CHLORIDE 0.75/3ML
0.75 VIAL, NEBULIZER (ML) INHALATION ONCE AS NEEDED
OUTPATIENT
Start: 2024-12-05

## 2024-12-05 RX ORDER — METHACHOLINE CHLORIDE 48 MG/3 ML
48 VIAL, NEBULIZER (ML) INHALATION ONCE AS NEEDED
OUTPATIENT
Start: 2024-12-05

## 2024-12-05 RX ORDER — ALBUTEROL SULFATE 0.83 MG/ML
3 SOLUTION RESPIRATORY (INHALATION) EVERY 10 MIN PRN
OUTPATIENT
Start: 2024-12-05

## 2024-12-05 RX ORDER — METHACHOLINE CHLORIDE 12 MG/3 ML
12 VIAL, NEBULIZER (ML) INHALATION ONCE AS NEEDED
OUTPATIENT
Start: 2024-12-05

## 2024-12-05 RX ORDER — METHACHOLINE CHLORIDE 0.1875/3ML
0.19 VIAL, NEBULIZER (ML) INHALATION ONCE AS NEEDED
OUTPATIENT
Start: 2024-12-05

## 2024-12-05 RX ORDER — METHACHOLINE CHLORIDE 0 MG/3 ML
3 VIAL, NEBULIZER (ML) INHALATION ONCE AS NEEDED
OUTPATIENT
Start: 2024-12-05

## 2024-12-05 ASSESSMENT — ENCOUNTER SYMPTOMS
LOSS OF SENSATION IN FEET: 0
OCCASIONAL FEELINGS OF UNSTEADINESS: 0
SINUS PRESSURE: 1
COUGH: 1
SINUS PAIN: 1
DEPRESSION: 0

## 2024-12-05 ASSESSMENT — PATIENT HEALTH QUESTIONNAIRE - PHQ9
2. FEELING DOWN, DEPRESSED OR HOPELESS: NOT AT ALL
SUM OF ALL RESPONSES TO PHQ9 QUESTIONS 1 AND 2: 0
1. LITTLE INTEREST OR PLEASURE IN DOING THINGS: NOT AT ALL

## 2024-12-05 ASSESSMENT — PAIN SCALES - GENERAL: PAINLEVEL_OUTOF10: 0-NO PAIN

## 2024-12-05 NOTE — PATIENT INSTRUCTIONS
Pulm granumlas seen on CT Chest 8/2024   _ JOSIE negative     Had perforated esophagus in 1994 and has known scar tisues Stable findings of prior granulomatous disease as described.  -Mild stable scarring at the posterior right lung base.    -Moderate three-vessel left-sided coronary artery calcifications.    allergic rhinitis  - purchase nasal saline over the counter - use 2-3 x per day to rinse out nasal passages and keep them moist   - fluticasone (flonase) 1 spray each nostril 2 x per day - remember to aim towards your ear   - cont cetrizine daily at am and xyzal in the at night   - check RAST and I Ge       Thank you for visiting the Pulmonary clinic today!       Return to clinic after 4-6  weeks and  or sooner if needed   Lisa Berry CNP  My office number is [phone] -     Call to schedule  for radiology - CT scans/PFTs etc at  [phone]  General scheduling  [phone]     Best way to get a hold of me is to call my office --> Please do not send me follow my health messages  Any test results will be discussed at next visit -- please make sure to make a follow up appt after testing.

## 2024-12-07 NOTE — RESULT ENCOUNTER NOTE
Please call the patient regarding his result. Pulmonary function tests showed normal spirometry that did not change with the inhaled medication they gave him, which goes against asthma as a cause of his cough. There was a mild restrictive component to the testing, which is non specific. He needs to follow up with pulmonary as we had ordered-so same plan.

## 2024-12-09 ENCOUNTER — HOSPITAL ENCOUNTER (OUTPATIENT)
Dept: RADIOLOGY | Facility: HOSPITAL | Age: 74
Discharge: HOME | End: 2024-12-09
Payer: MEDICARE

## 2024-12-09 ENCOUNTER — OFFICE VISIT (OUTPATIENT)
Dept: ORTHOPEDIC SURGERY | Facility: HOSPITAL | Age: 74
End: 2024-12-09
Payer: MEDICARE

## 2024-12-09 DIAGNOSIS — M76.62 ACHILLES TENDINITIS OF LEFT LOWER EXTREMITY: ICD-10-CM

## 2024-12-09 DIAGNOSIS — M25.572 LEFT ANKLE PAIN, UNSPECIFIED CHRONICITY: ICD-10-CM

## 2024-12-09 DIAGNOSIS — M67.874 ACHILLES TENDINOSIS OF LEFT ANKLE: Primary | ICD-10-CM

## 2024-12-09 PROCEDURE — 73610 X-RAY EXAM OF ANKLE: CPT | Mod: LT

## 2024-12-09 PROCEDURE — 1159F MED LIST DOCD IN RCRD: CPT | Performed by: ORTHOPAEDIC SURGERY

## 2024-12-09 PROCEDURE — 1157F ADVNC CARE PLAN IN RCRD: CPT | Performed by: ORTHOPAEDIC SURGERY

## 2024-12-09 PROCEDURE — 73610 X-RAY EXAM OF ANKLE: CPT | Mod: LEFT SIDE | Performed by: RADIOLOGY

## 2024-12-09 PROCEDURE — 99204 OFFICE O/P NEW MOD 45 MIN: CPT | Performed by: ORTHOPAEDIC SURGERY

## 2024-12-09 PROCEDURE — 99214 OFFICE O/P EST MOD 30 MIN: CPT | Performed by: ORTHOPAEDIC SURGERY

## 2024-12-09 NOTE — PROGRESS NOTES
HISTORY OF PRESENT ILLNESS  This is a pleasant 74 y.o. year old male  who presents on 12/09/2024 at the request of Katiuska Helm MD for evaluation of left ankle pain that has been present over/since 6 months ago.    How the condition occurred or started: walking everyday, got up to 2 miles a day of exercise walking and did two laps of jogging at end of his walking program and really flared up heel pain  Location of pain (patient points to): posterior heel  Quality of pain: Moderate but intermittent, overall mild since he has been resting over last few weeks  Modifying Factors: worse with starting up in am and doing exercise walking 1 mile  Associated Signs and symptoms: chronic swelling  Previous Treatment: none  PMH psoriasis, prediabetic    PHYSICAL EXAMINATION  Constitutional Exam: patient's height and weight reviewed, well-kempt  Psychiatric Exam: alert and oriented x 3, appropriate mood and behavior  Eye Exam: PATRICK, EOMI  Pulmonary Exam: breathing non-labored, no apparent distress  Lymphatic exam: no appreciable lymphadenopathy in the lower extremities  Cardiovascular exam: DP pulses 2+ bilaterally, PT 2+ bilaterally, toes are pink with good capillary refill, no pitting edema  Skin exam: no open lesions, rashes, abrasions or ulcerations  Neurological exam: sensation to light touch intact in both lower extremities in peripheral and dermatomal distributions (except for any abnormalities noted in musculoskeletal exam)    Musculoskeletal exam: left ankle: not tender over posterior heel but chronic enlargement of posterior heel due to posterior exostosis, negative ag test, stable ligamentous exam, good rom ankle and ST joint, achilles tightness, pes planus with mild hyperpronation    DATA/RESULTS REVIEW: I personally reviewed the patient's x-ray images and reports of the left ankle showing posterior exostosis calcaneus due to insertional achilles tendinopathy, no significant arthrosis, mild midfoot sag, no  fractures and no acute findings.     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  He does not have significant tenderness over the Achilles tendon insertion today and so he and I decided to use heel lift for the next 3 weeks to offload the tendon.  We discussed with him the importance of doing the stretching program 3 times a day while he is using the heel lifts otherwise he can make his gastroc tighter which will make the Achilles tendinitis flare worse typically.  Discussed with him good shoe wear with appropriate arch support since he does tend to pronate slightly.  Recommend physical therapy protocol including eccentric strengthening of Achilles and wrote out prescription for the patient and his physical therapist.  He will follow-up with us otherwise in 4 to 6 months or as needed.  We discussed with him some guidelines with respect to slowly increasing walking program.  Jogging is extremely high stress to the Achilles tendon and so he would have to go very slowly with respect to running progression to avoid flaring up the tendon again.  No surgical intervention is necessary and most patients do very well with conservative treatment as the calcification or exostosis has likely been present for a number of years as these calcifications take more than 6 months typically to develop.  We instructed him on a stretching program for the gastroc and hamstrings that he should do 3 times a day for 6 weeks and then daily afterwards for maintenance.  Use of voltaren gel applied to area of pain up to three times a day as needed for discomfort.  The patient's questions were answered in detail.      Note  "dictated with clipkit software, completed without full type editing to avoid delay.      Dear Referring Physician,  It was my pleasure to see your patient, in the office today.  Please refer to the detailed notes above for my findings and recommendations.  Thank you once again for your consultation request.  If you have any further questions or concerns, please feel free to contact me via email or at the phone number and address below.  Sincerely,    Linh Goldsmith MD   of Orthopedic Surgery, [employer]  Dual Fellowship-trained in Orthopedic Sports Medicine (Knee and Shoulder), and Foot and Ankle Surgery  [facility] [place]   [employer] Subspecialty Certificate in Orthopedic Sports Medicine  Head Team Physician Case \"[place]\" and [facility] \"[facility]\"  Team [facility] Phys[facility] [id number] Paralympic Games  Team [place] Figure Skating Medical Practitioner, including International Event Coverage  Director, Foot and Ankle Division, Department of Orthopedics    [address], [place] [id number]  [email or web address]  Office [phone]    "

## 2024-12-10 ENCOUNTER — OFFICE VISIT (OUTPATIENT)
Dept: URGENT CARE | Age: 74
End: 2024-12-10
Payer: MEDICARE

## 2024-12-10 VITALS
TEMPERATURE: 98.1 F | BODY MASS INDEX: 28.63 KG/M2 | OXYGEN SATURATION: 97 % | HEIGHT: 70 IN | HEART RATE: 67 BPM | DIASTOLIC BLOOD PRESSURE: 79 MMHG | SYSTOLIC BLOOD PRESSURE: 132 MMHG | RESPIRATION RATE: 18 BRPM | WEIGHT: 200 LBS

## 2024-12-10 DIAGNOSIS — R05.1 ACUTE COUGH: Primary | ICD-10-CM

## 2024-12-10 RX ORDER — BENZONATATE 200 MG/1
200 CAPSULE ORAL 3 TIMES DAILY PRN
Qty: 20 CAPSULE | Refills: 0 | Status: SHIPPED | OUTPATIENT
Start: 2024-12-10 | End: 2024-12-17

## 2024-12-10 RX ORDER — BENZONATATE 200 MG/1
200 CAPSULE ORAL 3 TIMES DAILY PRN
Qty: 20 CAPSULE | Refills: 0 | Status: SHIPPED | OUTPATIENT
Start: 2024-12-10 | End: 2024-12-10

## 2024-12-10 ASSESSMENT — ENCOUNTER SYMPTOMS
GASTROINTESTINAL NEGATIVE: 1
COUGH: 1
STRIDOR: 0
MYALGIAS: 0
ARTHRALGIAS: 0
EYES NEGATIVE: 1
CARDIOVASCULAR NEGATIVE: 1
CONSTITUTIONAL NEGATIVE: 1
SHORTNESS OF BREATH: 0
NEUROLOGICAL NEGATIVE: 1
PSYCHIATRIC NEGATIVE: 1
CHEST TIGHTNESS: 0
WHEEZING: 0

## 2024-12-10 ASSESSMENT — PAIN SCALES - GENERAL: PAINLEVEL_OUTOF10: 0-NO PAIN

## 2024-12-10 NOTE — PROGRESS NOTES
Subjective   Patient ID: Elpidio Jamil is a 74 y.o. male. They present today with a chief complaint of Cough.    History of Present Illness  States productive cough for 2 days, with thin, sputum, worse at nigh time. Has tried Dayquil with mild relief. Takes cetirizine and fluticasone daily. No other complaints at this time.      History provided by:  Patient  Cough  This is a new problem. The current episode started in the past 7 days. Pertinent negatives include no myalgias, shortness of breath or wheezing. He has tried OTC cough suppressant (Dayquil) for the symptoms.       Past Medical History  Allergies as of 12/10/2024 - Reviewed 12/10/2024   Allergen Reaction Noted    Nirmatrelvir-ritonavir Nausea/vomiting 01/31/2023       (Not in a hospital admission)       Past Medical History:   Diagnosis Date    Abdominal distension (gaseous) 03/23/2022    Abdominal bloating    Abnormal findings on diagnostic imaging of heart and coronary circulation     Abnormal computed tomography angiography of heart    Acute upper respiratory infection, unspecified 08/03/2022    Upper respiratory infection with cough and congestion    Anemia     Anxiety     Arrhythmia     Arthritis     Benign neoplasm of colon, unspecified 12/11/2012    Benign neoplasm of large intestine    Benign paroxysmal vertigo, unspecified ear 03/01/2017    BPV (benign positional vertigo)    Benign prostatic hyperplasia without lower urinary tract symptoms 08/05/2021    Benign prostatic hyperplasia without lower urinary tract symptoms    Body mass index (BMI) 27.0-27.9, adult 08/22/2019    BMI 27.0-27.9,adult    Body mass index (BMI) 28.0-28.9, adult 10/28/2021    BMI 28.0-28.9,adult    Cataract     COVID-19 11/10/2022    COVID-19 virus infection    COVID-19 virus infection 03/14/2023    Eczema     Encounter for follow-up examination after completed treatment for conditions other than malignant neoplasm 08/29/2018    Hospital discharge follow-up    Encounter  for immunization 08/22/2019    Need for shingles vaccine    Encounter for immunization 06/28/2017    Need for Tdap vaccination    Encounter for screening for malignant neoplasm of prostate 11/22/2014    Special screening examination for neoplasm of prostate    Encounter for screening for malignant neoplasm of prostate 07/08/2018    Screening for prostate cancer    Encounter for screening for other viral diseases 08/05/2021    Need for hepatitis C screening test    GERD (gastroesophageal reflux disease)     Impacted cerumen, bilateral 09/10/2020    Excessive ear wax, bilateral    Low back pain, unspecified 01/11/2022    Acute lumbar back pain    Muscle spasm of back 01/11/2022    Muscle spasm of back    Other conditions influencing health status     Pneumonia    Other enthesopathies, not elsewhere classified 03/09/2015    Tendonitis of shoulder    Other enthesopathy of right foot and ankle 12/07/2021    Tendinitis of right foot    Other fecal abnormalities 03/09/2015    Heme positive stool    Other muscle spasm 09/04/2018    Trapezius muscle spasm    Other prurigo 04/06/2021    Pruritic rash    Other skin changes     Skin irritation    Other skin changes due to chronic exposure to nonionizing radiation 03/29/2016    Sun-damaged skin    Other specified symptoms and signs involving the circulatory and respiratory systems 11/10/2022    Rales    Pain in left knee 09/03/2021    Knee pain, left    Pain in right knee 12/01/2020    Pain and swelling of right knee    Palpitations     Heart palpitations    Palpitations 01/31/2023    Personal history of colonic polyps     History of colonic polyps    Personal history of diseases of the blood and blood-forming organs and certain disorders involving the immune mechanism 11/20/2013    History of anemia    Personal history of diseases of the skin and subcutaneous tissue     History of dermatitis    Personal history of diseases of the skin and subcutaneous tissue 10/28/2021     History of skin pruritus    Personal history of other diseases of the circulatory system     History of mitral valve disorder    Personal history of other diseases of the digestive system     History of gastroesophageal reflux (GERD)    Personal history of other diseases of the digestive system 02/14/2019    History of esophageal stricture    Personal history of other diseases of the respiratory system 06/07/2019    History of paranasal sinus congestion    Personal history of other diseases of the respiratory system 11/10/2022    History of pleural effusion    Personal history of other drug therapy 05/21/2018    History of pneumococcal vaccination    Personal history of other drug therapy 10/14/2016    History of influenza vaccination    Personal history of other endocrine, nutritional and metabolic disease     History of high cholesterol    Personal history of other endocrine, nutritional and metabolic disease 08/22/2019    History of iron deficiency    Personal history of other medical treatment     History of nuclear stress test    Personal history of other specified conditions 09/04/2018    History of chest pain    Personal history of other specified conditions 06/08/2021    History of headache    Personal history of other specified conditions 05/14/2019    History of chronic cough    Personal history of other specified conditions 02/12/2019    History of dysphagia    Personal history of other specified conditions 10/08/2015    History of hoarseness    Personal history of other specified conditions 11/10/2022    History of wheezing    Personal history of pneumonia (recurrent) 11/10/2022    History of pneumonia    Personal history of pneumonia (recurrent)     History of pneumonia    Pleural effusion 03/14/2023    Pneumonia 03/14/2023    Pruritus ani 08/23/2016    Perianal irritation    Pyuria 08/22/2019    Pyuria    Rales 03/14/2023    Shingles     Sleep apnea     Superficial mycosis, unspecified 10/08/2015     "Fungal dermatitis    Unspecified abdominal hernia without obstruction or gangrene     Hernia    Varicella     Ventricular premature depolarization 10/19/2021    PVC (premature ventricular contraction)    Wheezing 03/14/2023    Zoster without complications 08/10/2018    Herpes zoster without complication       Past Surgical History:   Procedure Laterality Date    CIRCUMCISION, PRIMARY      COLONOSCOPY  04/26/2012    Colonoscopy (Fiberoptic)    ESOPHAGOGASTRODUODENOSCOPY  01/08/2015    Diagnostic Esophagogastroduodenoscopy    EYE SURGERY      HERNIA REPAIR  06/29/2015    Hernia Repair    HERNIA REPAIR  04/26/2012    Inguinal Hernia Repair    OTHER SURGICAL HISTORY  02/12/2019    Myringotomy    OTHER SURGICAL HISTORY  01/08/2015    Repair Of Esophageal Stricture    OTHER SURGICAL HISTORY  12/07/2021    Skin lesion excision    OTHER SURGICAL HISTORY  12/09/2021    Mohs surgery    OTHER SURGICAL HISTORY  04/26/2012    Evaluation Of Swallowing And Oral Function    OTHER SURGICAL HISTORY  04/26/2012    Chest Tube Insertion        reports that he has never smoked. He has been exposed to tobacco smoke. He has never used smokeless tobacco. He reports that he does not currently use alcohol. He reports that he does not use drugs.    Review of Systems  Review of Systems   Constitutional: Negative.    HENT: Negative.     Eyes: Negative.    Respiratory:  Positive for cough. Negative for chest tightness, shortness of breath, wheezing and stridor.    Cardiovascular: Negative.    Gastrointestinal: Negative.    Musculoskeletal:  Negative for arthralgias and myalgias.   Neurological: Negative.    Psychiatric/Behavioral: Negative.                                    Objective    Vitals:    12/10/24 0911   BP: 132/79   Pulse: 67   Resp: 18   Temp: 36.7 °C (98.1 °F)   TempSrc: Oral   SpO2: 97%   Weight: 90.7 kg (200 lb)   Height: 1.778 m (5' 10\")     No LMP for male patient.    Physical Exam  Constitutional:       Appearance: Normal " appearance.   HENT:      Head: Normocephalic and atraumatic.      Right Ear: Tympanic membrane and ear canal normal.      Left Ear: Tympanic membrane and ear canal normal.      Nose: Nose normal.      Mouth/Throat:      Mouth: Mucous membranes are moist.      Pharynx: Oropharynx is clear.   Cardiovascular:      Rate and Rhythm: Normal rate and regular rhythm.      Heart sounds: Normal heart sounds.   Pulmonary:      Effort: Pulmonary effort is normal. No respiratory distress.      Breath sounds: Normal breath sounds. No wheezing.   Skin:     General: Skin is warm and dry.   Neurological:      Mental Status: He is alert and oriented to person, place, and time.   Psychiatric:         Mood and Affect: Mood normal.         Behavior: Behavior normal.         Procedures    Point of Care Test & Imaging Results from this visit  none    Diagnostic study results (if any) were reviewed by ORLIN Good.    Assessment/Plan   Allergies, medications, history, and pertinent labs/EKGs/Imaging reviewed by ORLIN Good.     Medical Decision Making  States productive cough for 2 days, with thin sputum, worse at night time. Has tried Dayquil with mild relief. Takes cetirizine and fluticasone daily. No other complaints at this time.    Benzonatate for cough. Discussed viral etiology. OTC symptoms management at this time. Patient verbalized understanding and agreed with the plan of care.    At time of discharge, patient was clinically well-appearing and appropriate for outpatient management. The patient/parent/guardian was educated regarding diagnosis, supportive care, OTC and Rx medications. The patient/parent/guardian was given the opportunity to ask questions prior to discharge. They verbalized understanding of discussion of treatment plan, expected course of illness and/or injury, indications on when to return to , when to seek further evaluation in ED/call 911, and the need to follow up with PCP and/or  specialist as referred. Patient/parent/guardian was provided with work/school documentation if requested. Patient stable upon discharge.      Orders and Diagnoses  Diagnoses and all orders for this visit:  Acute cough  -     benzonatate (Tessalon) 200 mg capsule; Take 1 capsule (200 mg) by mouth 3 times a day as needed for cough for up to 7 days. Do not crush or chew.      Medical Admin Record      Patient disposition: Home    Electronically signed by ORLIN Good  10:08 AM

## 2024-12-14 ENCOUNTER — OFFICE VISIT (OUTPATIENT)
Dept: URGENT CARE | Age: 74
End: 2024-12-14
Payer: MEDICARE

## 2024-12-14 ENCOUNTER — ANCILLARY PROCEDURE (OUTPATIENT)
Dept: URGENT CARE | Age: 74
End: 2024-12-14
Payer: MEDICARE

## 2024-12-14 VITALS
DIASTOLIC BLOOD PRESSURE: 75 MMHG | WEIGHT: 197 LBS | SYSTOLIC BLOOD PRESSURE: 126 MMHG | TEMPERATURE: 96.1 F | OXYGEN SATURATION: 93 % | HEART RATE: 62 BPM | BODY MASS INDEX: 28.27 KG/M2 | RESPIRATION RATE: 16 BRPM

## 2024-12-14 DIAGNOSIS — J06.9 VIRAL URI: Primary | ICD-10-CM

## 2024-12-14 DIAGNOSIS — R05.1 ACUTE COUGH: ICD-10-CM

## 2024-12-14 PROCEDURE — 71046 X-RAY EXAM CHEST 2 VIEWS: CPT | Performed by: FAMILY MEDICINE

## 2024-12-14 RX ORDER — ALBUTEROL SULFATE 90 UG/1
2 INHALANT RESPIRATORY (INHALATION) EVERY 6 HOURS PRN
Qty: 18 G | Refills: 0 | Status: SHIPPED | OUTPATIENT
Start: 2024-12-14 | End: 2025-12-14

## 2024-12-14 NOTE — PROGRESS NOTES
Subjective   Patient ID: Elpidio Jamil is a 74 y.o. male. They present today with a chief complaint of Cough.    History of Present Illness  HPI  Days of cough, congestion, postnasal drip runny nose. No fevers have been noted. Patient denies shortness of breath, chest pain, or wheezing. No red eyes, eye pain, or discharge. They deny nausea vomiting or diarrhea. No known exposures to strep mono influenza, COVID-19 or pneumonia. No skin rashes are noted. Over-the-counter medications are offering minimal relief from symptoms.  Non-smoker.      Past Medical History  Allergies as of 12/14/2024 - Reviewed 12/14/2024   Allergen Reaction Noted    Nirmatrelvir-ritonavir Nausea/vomiting 01/31/2023       (Not in a hospital admission)       Past Medical History:   Diagnosis Date    Abdominal distension (gaseous) 03/23/2022    Abdominal bloating    Abnormal findings on diagnostic imaging of heart and coronary circulation     Abnormal computed tomography angiography of heart    Acute upper respiratory infection, unspecified 08/03/2022    Upper respiratory infection with cough and congestion    Anemia     Anxiety     Arrhythmia     Arthritis     Benign neoplasm of colon, unspecified 12/11/2012    Benign neoplasm of large intestine    Benign paroxysmal vertigo, unspecified ear 03/01/2017    BPV (benign positional vertigo)    Benign prostatic hyperplasia without lower urinary tract symptoms 08/05/2021    Benign prostatic hyperplasia without lower urinary tract symptoms    Body mass index (BMI) 27.0-27.9, adult 08/22/2019    BMI 27.0-27.9,adult    Body mass index (BMI) 28.0-28.9, adult 10/28/2021    BMI 28.0-28.9,adult    Cataract     COVID-19 11/10/2022    COVID-19 virus infection    COVID-19 virus infection 03/14/2023    Eczema     Encounter for follow-up examination after completed treatment for conditions other than malignant neoplasm 08/29/2018    Hospital discharge follow-up    Encounter for immunization 08/22/2019    Need  for shingles vaccine    Encounter for immunization 06/28/2017    Need for Tdap vaccination    Encounter for screening for malignant neoplasm of prostate 11/22/2014    Special screening examination for neoplasm of prostate    Encounter for screening for malignant neoplasm of prostate 07/08/2018    Screening for prostate cancer    Encounter for screening for other viral diseases 08/05/2021    Need for hepatitis C screening test    GERD (gastroesophageal reflux disease)     Impacted cerumen, bilateral 09/10/2020    Excessive ear wax, bilateral    Low back pain, unspecified 01/11/2022    Acute lumbar back pain    Muscle spasm of back 01/11/2022    Muscle spasm of back    Other conditions influencing health status     Pneumonia    Other enthesopathies, not elsewhere classified 03/09/2015    Tendonitis of shoulder    Other enthesopathy of right foot and ankle 12/07/2021    Tendinitis of right foot    Other fecal abnormalities 03/09/2015    Heme positive stool    Other muscle spasm 09/04/2018    Trapezius muscle spasm    Other prurigo 04/06/2021    Pruritic rash    Other skin changes     Skin irritation    Other skin changes due to chronic exposure to nonionizing radiation 03/29/2016    Sun-damaged skin    Other specified symptoms and signs involving the circulatory and respiratory systems 11/10/2022    Rales    Pain in left knee 09/03/2021    Knee pain, left    Pain in right knee 12/01/2020    Pain and swelling of right knee    Palpitations     Heart palpitations    Palpitations 01/31/2023    Personal history of colonic polyps     History of colonic polyps    Personal history of diseases of the blood and blood-forming organs and certain disorders involving the immune mechanism 11/20/2013    History of anemia    Personal history of diseases of the skin and subcutaneous tissue     History of dermatitis    Personal history of diseases of the skin and subcutaneous tissue 10/28/2021    History of skin pruritus    Personal  history of other diseases of the circulatory system     History of mitral valve disorder    Personal history of other diseases of the digestive system     History of gastroesophageal reflux (GERD)    Personal history of other diseases of the digestive system 02/14/2019    History of esophageal stricture    Personal history of other diseases of the respiratory system 06/07/2019    History of paranasal sinus congestion    Personal history of other diseases of the respiratory system 11/10/2022    History of pleural effusion    Personal history of other drug therapy 05/21/2018    History of pneumococcal vaccination    Personal history of other drug therapy 10/14/2016    History of influenza vaccination    Personal history of other endocrine, nutritional and metabolic disease     History of high cholesterol    Personal history of other endocrine, nutritional and metabolic disease 08/22/2019    History of iron deficiency    Personal history of other medical treatment     History of nuclear stress test    Personal history of other specified conditions 09/04/2018    History of chest pain    Personal history of other specified conditions 06/08/2021    History of headache    Personal history of other specified conditions 05/14/2019    History of chronic cough    Personal history of other specified conditions 02/12/2019    History of dysphagia    Personal history of other specified conditions 10/08/2015    History of hoarseness    Personal history of other specified conditions 11/10/2022    History of wheezing    Personal history of pneumonia (recurrent) 11/10/2022    History of pneumonia    Personal history of pneumonia (recurrent)     History of pneumonia    Pleural effusion 03/14/2023    Pneumonia 03/14/2023    Pruritus ani 08/23/2016    Perianal irritation    Pyuria 08/22/2019    Pyuria    Rales 03/14/2023    Shingles     Sleep apnea     Superficial mycosis, unspecified 10/08/2015    Fungal dermatitis    Unspecified  abdominal hernia without obstruction or gangrene     Hernia    Varicella     Ventricular premature depolarization 10/19/2021    PVC (premature ventricular contraction)    Wheezing 03/14/2023    Zoster without complications 08/10/2018    Herpes zoster without complication       Past Surgical History:   Procedure Laterality Date    CIRCUMCISION, PRIMARY      COLONOSCOPY  04/26/2012    Colonoscopy (Fiberoptic)    ESOPHAGOGASTRODUODENOSCOPY  01/08/2015    Diagnostic Esophagogastroduodenoscopy    EYE SURGERY      HERNIA REPAIR  06/29/2015    Hernia Repair    HERNIA REPAIR  04/26/2012    Inguinal Hernia Repair    OTHER SURGICAL HISTORY  02/12/2019    Myringotomy    OTHER SURGICAL HISTORY  01/08/2015    Repair Of Esophageal Stricture    OTHER SURGICAL HISTORY  12/07/2021    Skin lesion excision    OTHER SURGICAL HISTORY  12/09/2021    Mohs surgery    OTHER SURGICAL HISTORY  04/26/2012    Evaluation Of Swallowing And Oral Function    OTHER SURGICAL HISTORY  04/26/2012    Chest Tube Insertion        reports that he has never smoked. He has been exposed to tobacco smoke. He has never used smokeless tobacco. He reports that he does not currently use alcohol. He reports that he does not use drugs.    Review of Systems  Review of Systems     As in history of present illness                          Objective    Vitals:    12/14/24 1049   BP: 126/75   Pulse: 62   Resp: 16   Temp: 35.6 °C (96.1 °F)   SpO2: 93%   Weight: 89.4 kg (197 lb)     No LMP for male patient.    Physical Exam  Gen.-alert cooperative and in no apparent distress  Head and face- no swelling redness, tenderness or rash  Eyes-EOMI, no redness or discharge is noted  ENT- bilateral nasal congestion with clear rhinorrhea and postnasal drip in oral pharynx  Neck- normal range of motion with no lymphadenopathy or mass.   Pulmonary- no respiratory distress noted. Lungs clear to auscultation without wheezes rhonchi or rales  Skin- no rashes discoloration or skin lesions  noted  Lymphatic-- no lymph node swelling or tenderness appreciated  Procedures    Point of Care Test & Imaging Results from this visit  No results found for this visit on 12/14/24.   XR chest 2 views    Result Date: 12/14/2024  Interpreted By:  Christnia Batres, STUDY: XR CHEST 2 VIEWS; ;  12/14/2024 11:09 am   INDICATION: Signs/Symptoms:Persistent cough.   ,R05.1 Acute cough   COMPARISON: 08/30/2022   ACCESSION NUMBER(S): [id number]   ORDERING CLINICIAN: ANASTACIO ALARCON   FINDINGS: Cardiac silhouette is stable in size and morphology. Lungs are clear without consolidative airspace opacities. No pleural effusions or pneumothorax. No acute osseous findings.       No acute cardiopulmonary process.     MACRO: None   Signed by: Christina Batres 12/14/2024 11:13 AM Dictation workstation:   [id number]     Diagnostic study results (if any) were reviewed by Anastacio Alarcon MD.    Assessment/Plan   Allergies, medications, history, and pertinent labs/EKGs/Imaging reviewed by Anastacio Alarcon MD.     Medical Decision Making  At time of discharge patient was clinically well-appearing and HDS for outpatient management. The patient and/or family was educated regarding diagnosis, supportive care, OTC and Rx medications. The patient and/or family was given the opportunity to ask questions prior to discharge.  They verbalized understanding of my discussion of the plans for treatment, expected course, indications to return to  or seek further evaluation in ED, and the need for timely follow up as directed.   They were provided with a work/school excuse if requested.    Orders and Diagnoses  Diagnoses and all orders for this visit:  Viral URI  Acute cough  -     XR chest 2 views; Future  -     albuterol 90 mcg/actuation inhaler; Inhale 2 puffs every 6 hours if needed for wheezing.      Medical Admin Record      Patient disposition: Home    Electronically signed by Anastacio Alarcon MD  11:15 AM

## 2024-12-17 ENCOUNTER — APPOINTMENT (OUTPATIENT)
Dept: AUDIOLOGY | Facility: CLINIC | Age: 74
End: 2024-12-17
Payer: MEDICARE

## 2024-12-17 ENCOUNTER — APPOINTMENT (OUTPATIENT)
Dept: OTOLARYNGOLOGY | Facility: CLINIC | Age: 74
End: 2024-12-17
Payer: MEDICARE

## 2024-12-17 ENCOUNTER — LAB (OUTPATIENT)
Dept: LAB | Facility: LAB | Age: 74
End: 2024-12-17
Payer: MEDICARE

## 2024-12-17 DIAGNOSIS — J30.89 ALLERGIC RHINITIS DUE TO OTHER ALLERGIC TRIGGER, UNSPECIFIED SEASONALITY: ICD-10-CM

## 2024-12-17 PROCEDURE — 85025 COMPLETE CBC W/AUTO DIFF WBC: CPT

## 2024-12-17 PROCEDURE — 86003 ALLG SPEC IGE CRUDE XTRC EA: CPT

## 2024-12-17 PROCEDURE — 36415 COLL VENOUS BLD VENIPUNCTURE: CPT

## 2024-12-17 PROCEDURE — 82785 ASSAY OF IGE: CPT

## 2024-12-18 LAB
A ALTERNATA IGE QN: 0.75 KU/L
A FUMIGATUS IGE QN: <0.1 KU/L
BASOPHILS # BLD AUTO: 0.08 X10*3/UL (ref 0–0.1)
BASOPHILS NFR BLD AUTO: 1.3 %
BERMUDA GRASS IGE QN: <0.1 KU/L
BOXELDER IGE QN: <0.1 KU/L
C HERBARUM IGE QN: 0.11 KU/L
CALIF WALNUT POLN IGE QN: <0.1 KU/L
CAT DANDER IGE QN: <0.1 KU/L
CMN PIGWEED IGE QN: <0.1 KU/L
COMMON RAGWEED IGE QN: <0.1 KU/L
COTTONWOOD IGE QN: <0.1 KU/L
D FARINAE IGE QN: <0.1 KU/L
D PTERONYSS IGE QN: <0.1 KU/L
DOG DANDER IGE QN: <0.1 KU/L
ENGL PLANTAIN IGE QN: <0.1 KU/L
EOSINOPHIL # BLD AUTO: 0.34 X10*3/UL (ref 0–0.4)
EOSINOPHIL NFR BLD AUTO: 5.6 %
ERYTHROCYTE [DISTWIDTH] IN BLOOD BY AUTOMATED COUNT: 12.4 % (ref 11.5–14.5)
GOOSEFOOT IGE QN: <0.1 KU/L
HCT VFR BLD AUTO: 42.8 % (ref 41–52)
HGB BLD-MCNC: 14 G/DL (ref 13.5–17.5)
IMM GRANULOCYTES # BLD AUTO: 0.02 X10*3/UL (ref 0–0.5)
IMM GRANULOCYTES NFR BLD AUTO: 0.3 % (ref 0–0.9)
JOHNSON GRASS IGE QN: <0.1 KU/L
KENT BLUE GRASS IGE QN: 0.4 KU/L
LONDON PLANE IGE QN: <0.1 KU/L
LYMPHOCYTES # BLD AUTO: 1.87 X10*3/UL (ref 0.8–3)
LYMPHOCYTES NFR BLD AUTO: 31 %
MCH RBC QN AUTO: 30.8 PG (ref 26–34)
MCHC RBC AUTO-ENTMCNC: 32.7 G/DL (ref 32–36)
MCV RBC AUTO: 94 FL (ref 80–100)
MONOCYTES # BLD AUTO: 0.59 X10*3/UL (ref 0.05–0.8)
MONOCYTES NFR BLD AUTO: 9.8 %
MT JUNIPER IGE QN: <0.1 KU/L
NEUTROPHILS # BLD AUTO: 3.13 X10*3/UL (ref 1.6–5.5)
NEUTROPHILS NFR BLD AUTO: 52 %
NRBC BLD-RTO: 0 /100 WBCS (ref 0–0)
P NOTATUM IGE QN: <0.1 KU/L
PECAN/HICK TREE IGE QN: <0.1 KU/L
PLATELET # BLD AUTO: 224 X10*3/UL (ref 150–450)
RBC # BLD AUTO: 4.54 X10*6/UL (ref 4.5–5.9)
ROACH IGE QN: <0.1 KU/L
SALTWORT IGE QN: <0.1 KU/L
SHEEP SORREL IGE QN: <0.1 KU/L
SILVER BIRCH IGE QN: <0.1 KU/L
TIMOTHY IGE QN: 0.29 KU/L
TOTAL IGE SMQN RAST: 89.2 KU/L
WBC # BLD AUTO: 6 X10*3/UL (ref 4.4–11.3)
WHITE ASH IGE QN: <0.1 KU/L
WHITE ELM IGE QN: <0.1 KU/L
WHITE MULBERRY IGE QN: <0.1 KU/L
WHITE OAK IGE QN: <0.1 KU/L

## 2025-01-08 ASSESSMENT — ENCOUNTER SYMPTOMS
SINUS PAIN: 1
SINUS PRESSURE: 1
COUGH: 1

## 2025-01-08 NOTE — PROGRESS NOTES
Patient: Elpidio Jamil    [id number]  : 1950 -- AGE 74 y.o.    Provider: ORLIN Enamorado     Location [facility]   Service Date: 2025              [facility]  New Visit Note      HISTORY OF PRESENT ILLNESS     The patient's referring provider is: No ref. provider found    HISTORY OF PRESENT ILLNESS   Elpidio Jamil is a 74 y.o. male who presents to a [facility] for an evaluation with concerns of Follow-up. I have independently interviewed and examined the patient in the office and reviewed available records.    Current History 2024    On today's visit, the patient reports intermittent cough that is worst in the morning, sometimes at night but not awakening him, not really having sputum to expectorate, this started a couple years. He exercises on the [employer] - he can walk/jog a couple miles without dyspnea Denies dyspnea on exertion at rest.  Currently sits for most of the day, works inside the house, but does not carry loads and do strenuous exercise. They are active in her everyday life and carries loads and does strenuous exercise. He is only troubled by breathlessness except on strenuous exercise (mMRC 0).  He was a  - he feels it interferes with talking. Denies throat clearing. C/o nasal congestion. Has sinus headaches affected with the weather changes. Denies orthopnea, pnd, or feliz.   Weight has been mostly stable. Denies  , wheezing, and denies green, blood streaks, but no sputum. No night cough. No hemoptysis. No fever or shivering chills. . Also complains of heartburn- on PPI and had EGD and is better. Denies chest pain   He is taking flonase twice a day and takes zyrtec     Previous pulmonary history:  no history of recurrent infections, or lung disease as a child.  No previous lung hx, never on oxygen or inhaler therapy.     Inhalers/nebulized medications: none    Hospitalization  History: Not been hospitalized over the last year for breathing related problem.    Sleep history:  Has IVONNE - on CPAP - managed by Dr. Tang     Current History 1/16/2024      Seen in urgent care x 2 times since last visit - for cough, at first visit prescribed tessalone perrles, at second visit albuterol and CXR No acute cardiopulmonary process.     On today's visit, the patient reports continues to cough. Productive greenish yellow, dueto coughingso much he couldn't catch his breath.  He took albuterol for 2 days. He wakes up coughing in the morning. He has nasal congestion. He is on zyrtec .  He using flonase. Denies wheezing. Denies shortness of breath atrest  Dyspnea with strenuous exertion.    He is on prevacid for quite awhile - and was seen GI.   Denies nighttime cough  Cough is mostly in the am   He is not performing nasal rinses       ALLERGIES AND MEDICATIONS     ALLERGIES  Allergies   Allergen Reactions    Nirmatrelvir-Ritonavir Nausea/vomiting     AKA.. Paxlovid       MEDICATIONS  Current Outpatient Medications   Medication Sig Dispense Refill    albuterol 90 mcg/actuation inhaler Inhale 2 puffs every 6 hours if needed for wheezing. 18 g 0    aspirin 81 mg EC tablet Take 1 tablet (81 mg) by mouth once daily. with food usually in the am      atenolol (Tenormin) 25 mg tablet Take 0.5 tablets (12.5 mg) by mouth once daily. 45 tablet 3    atorvastatin (Lipitor) 40 mg tablet TAKE 1 TABLET BY MOUTH ONCE  DAILY AT BEDTIME 90 tablet 3    CALCIUM CITRATE-VITAMIN D3 ORAL Take 1 tablet by mouth 1 (one) time each day.      cetirizine (ZYRTEC) 10 mg capsule Take 1 capsule (10 mg) by mouth once daily at bedtime. 90 capsule 3    cyanocobalamin, vitamin B-12, 1,000 mcg capsule Take 1 capsule by mouth 5 (five) times a week.      diclofenac sodium (Voltaren) 1 % gel Apply 2.25 inches (2 g) topically 3 times a day.      FLUoxetine (PROzac) 10 mg capsule Take 1 capsule (10 mg) by mouth once daily. with 40mg to equal 50mg  daily 90 capsule 3    FLUoxetine (PROzac) 40 mg capsule TAKE 1 CAPSULE BY MOUTH ONCE  DAILY WITH 10 MG TO MAKE 50 MG 90 capsule 3    fluticasone (Flonase) 50 mcg/actuation nasal spray Administer 2 sprays into each nostril 2 times a day. 64 g 3    lansoprazole (Prevacid) 30 mg DR capsule Take one cap daily  30 minutes ac 90 capsule 3    levocetirizine (Xyzal) 5 mg tablet Take 1 tablet (5 mg) by mouth once daily in the evening. 30 tablet 11    omega-3 acid ethyl esters (Lovaza) 1 gram capsule Take 3 capsules (3 g) by mouth once daily. Take one cap in the am. And 2 caps in the pm.      psyllium husk-calcium (Metamucil Plus Calcium) 1-60 gram-mg capsule Take 5 capsules by mouth 1 (one) time each day.      solifenacin (VESIcare) 5 mg tablet Take 1 tablet (5 mg) by mouth once daily. Swallow tablet whole; do not crush, chew, or split. 90 tablet 3    traZODone (Desyrel) 50 mg tablet Take 1 tablet (50 mg) by mouth once daily at bedtime. 90 tablet 1     No current facility-administered medications for this visit.         PAST HISTORY     PAST MEDICAL HISTORY  PVC's  Anemia  IVONNE  Depression/anxiety      PAST SURGICAL HISTORY  Past Surgical History:   Procedure Laterality Date    CIRCUMCISION, PRIMARY      COLONOSCOPY  04/26/2012    Colonoscopy (Fiberoptic)    ESOPHAGOGASTRODUODENOSCOPY  01/08/2015    Diagnostic Esophagogastroduodenoscopy    EYE SURGERY      HERNIA REPAIR  06/29/2015    Hernia Repair    HERNIA REPAIR  04/26/2012    Inguinal Hernia Repair    OTHER SURGICAL HISTORY  02/12/2019    Myringotomy    OTHER SURGICAL HISTORY  01/08/2015    Repair Of Esophageal Stricture    OTHER SURGICAL HISTORY  12/07/2021    Skin lesion excision    OTHER SURGICAL HISTORY  12/09/2021    Mohs surgery    OTHER SURGICAL HISTORY  04/26/2012    Evaluation Of Swallowing And Oral Function    OTHER SURGICAL HISTORY  04/26/2012    Chest Tube Insertion       IMMUNIZATION HISTORY  Immunization History   Administered Date(s) Administered    Flu  vaccine (IIV4), preservative free *Check age/dose* 10/14/2016    Flu vaccine, quadrivalent, high-dose, preservative free, age 65y+ (FLUZONE) 10/18/2023    Flu vaccine, trivalent, preservative free, HIGH-DOSE, age 65y+ (Fluzone) 10/09/2024    Hep A / Hep B 05/17/2005, 06/17/2005, 06/06/2006    Hep A, Unspecified 05/17/2005, 06/17/2005, 06/06/2006    Hep B, Unspecified 05/17/2005, 06/17/2005, 06/06/2006    Influenza, Seasonal, Quadrivalent, Adjuvanted 10/10/2021, 10/07/2022    Influenza, Unspecified 10/05/2017, 09/26/2018, 11/21/2019, 10/06/2020    Influenza, seasonal, injectable 12/01/2009, 12/16/2010, 09/11/2012, 10/01/2013, 09/23/2014, 10/08/2015    Moderna COVID-19 vaccine, 12 years and older (50mcg/0.5mL)(Spikevax) 10/09/2024    Moderna SARS-CoV-2 Booster 10/07/2022    Moderna SARS-CoV-2 Vaccination 03/06/2021, 04/03/2021, 12/08/2021    Novel influenza-H1N1-09, preservative-free 10/29/2009    Pfizer COVID-19 vaccine, 12 years and older, (30mcg/0.3mL) (Comirnaty) 11/16/2023    Pneumococcal conjugate vaccine, 13-valent (PREVNAR 13) 03/02/2018    Pneumococcal polysaccharide vaccine, 23-valent, age 2 years and older (PNEUMOVAX 23) 04/23/2019    Td (adult), unspecified 01/01/2005    Tdap vaccine, age 7 year and older (BOOSTRIX, ADACEL) 06/06/2006, 06/28/2017    Tetanus toxoid, adsorbed 06/06/2006    Typhoid, Parenteral 05/17/2005    Typhoid, Unspecified 05/17/2005    Zoster vaccine, recombinant, adult (SHINGRIX) 05/14/2019, 08/22/2019    Zoster, Unspecified 09/01/2017       SOCIAL HISTORY  He  reports that he has never smoked. He has been exposed to tobacco smoke. He has never used smokeless tobacco. He reports that he does not currently use alcohol. He reports that he does not use drugs. He Patient     OCCUPATIONAL/ENVIRONMENTAL HISTORY  Previously worked as:   DOES/DOES NOT EC: does not have known exposure to asbestos, silica, beryllium or inhaled metals.  DOES/DOES NOT EC: does not have exposure to birds or  exotic animals. Has a dog     FAMILY HISTORY  Family History   Problem Relation Name Age of Onset    COPD Mother Jennifer     Dementia Mother Jennifer     Hyperlipidemia Mother Jennifer     Coronary artery disease Father Eben         Premature    Other (cardiac disorder) Father Eben     Early natural death Father Eben     Heart attack Father Eben     Breast cancer Daughter Yany     Colon cancer Maternal Grandmother ELIDA LOVETT     Cancer Maternal Grandmother ELIDA LOVETT     Cancer Maternal Grandfather Pop Lovett      DOES/DOES NOT EC: does have a family history of pulmonary disease. Father smoked   DOES/DOES NOT EC: does have a family history of cancer.  DOES/DOES NOT EC: does not have a family history of autoimmune disorders.    RESULTS/DATA     Pulmonary Function Test Results     Methacholine challenge scheduled 1/13/2025     Complete Pulmonary Function Test Pre/Post Bronchodilator (Spirometry Pre/Post/DLCO/Lung Volumes) 12/2/2024  Status: Final result     Study Result    Narrative & Impression   Normal spirometry. There is no significant bronchodilator response. Lung volumes indicate a mild restrictive defect. The DLCO is normal, however, it is not corrected for hemoglobin.     Scans on Order [id number]      Pulmonary Function Test - Scan on 12/2/2024 10:59 AM                        Pulmonary Function Test - Scan on 12/3/2024  2:29 PM                             Methacholine Challenge (Bronchial Provocation) 1/13/2025  Status: Final result     Related Results     Exhaled Nitric Oxide (FeNO) Final result 1/13/2025 1:18 PM              Scans on Order [id number]    Pulmonary - Diagnostics Testing - Scan on 1/13/2025  4:47 PM: Pulmonary Testing              Chest Radiograph     XR CHEST 2 VIEWS; ;  12/14/2024         FINDINGS:  Cardiac silhouette is stable in size and morphology. Lungs are clear  without consolidative airspace opacities. No pleural effusions or  pneumothorax. No acute osseous findings.       IMPRESSION:  No acute cardiopulmonary process.      Chest CT Scan     CT CHEST WO IV CONTRAST;  8/19/2024      COMPARISON:  CT scan chest from 09/02/2022, CT coronary artery calcium score exam  from 08/30/2021, and CT scan chest from 02/22/2016.      FINDINGS:  CHEST WALL/BASE OF THE NECK:  The chest wall was grossly intact.  No thyromegaly or thyroid mass.      LUNGS/ PLEURA/ AND TRACHEA:  Stable calcified granuloma in the posterolateral right upper lobe on  lung window image 90. Stable calcified granuloma in the perihilar  right middle lobe. Stable peripheral anterior inferior right middle  lobe calcified granuloma. Mild stable scarring at the posterior right  lung base. No mass or pneumonia in either lung. No pleural effusion.  No pneumothorax.  The trachea was grossly intact.      MEDIASTINUM/SMITH:  No suspicious mediastinal, hilar, or axillary mass or adenopathy in  this unenhanced exam. Stable calcified lymph nodes in the right hilum  and right subcarinal mediastinum. No cardiomegaly. Moderate coronary  artery calcifications involving left main, lad, and left circumflex  vessels. No pericardial effusion.  No thoracic aortic aneurysm.      BONES:  No destructive lytic or blastic bone lesion. Mild scattered mid and  distal thoracic spine disc space narrowing with endplate  osteophytosis.      UPPER ABDOMEN:  Stable calcified splenic granulomas. Otherwise,  the imaged upper  abdomen was grossly intact.      IMPRESSION:  Stable findings of prior granulomatous disease as described.      Mild stable scarring at the posterior right lung base.      Moderate three-vessel left-sided coronary artery calcifications.      Echocardiogram     No testing done          REVIEW OF SYSTEMS     REVIEW OF SYSTEMS  Review of Systems   HENT:  Positive for postnasal drip, sinus pressure and sinus pain.    Respiratory:  Positive for cough.    All other systems reviewed and are negative.        PHYSICAL EXAM     VITAL SIGNS: /67  "  Pulse 62   Temp 36.2 °C (97.1 °F) (Temporal)   Resp 16   Ht 1.778 m (5' 10\")   Wt 90.9 kg (200 lb 6.4 oz)   SpO2 94%   BMI 28.75 kg/m²      CURRENT WEIGHT: [unfilled]  BMI: [unfilled]  PREVIOUS WEIGHTS:  Wt Readings from Last 3 Encounters:   01/16/25 90.9 kg (200 lb 6.4 oz)   12/14/24 89.4 kg (197 lb)   12/10/24 90.7 kg (200 lb)       Physical Exam  Constitutional:       Appearance: Normal appearance.   HENT:      Head: Normocephalic and atraumatic.      Right Ear: External ear normal.      Left Ear: External ear normal.      Nose: Nose normal.      Mouth/Throat:      Mouth: Mucous membranes are moist.      Pharynx: Oropharynx is clear.      Comments: Raspy voice   Eyes:      Extraocular Movements: Extraocular movements intact.      Conjunctiva/sclera: Conjunctivae normal.      Pupils: Pupils are equal, round, and reactive to light.   Cardiovascular:      Rate and Rhythm: Normal rate and regular rhythm.      Pulses: Normal pulses.      Heart sounds: Normal heart sounds.   Pulmonary:      Effort: Pulmonary effort is normal.      Breath sounds: Normal breath sounds.   Abdominal:      General: Bowel sounds are normal.      Palpations: Abdomen is soft.   Musculoskeletal:         General: Normal range of motion.      Cervical back: Normal range of motion and neck supple.   Skin:     General: Skin is warm and dry.   Neurological:      General: No focal deficit present.      Mental Status: He is alert and oriented to person, place, and time. Mental status is at baseline.   Psychiatric:         Mood and Affect: Mood normal.         Behavior: Behavior normal.         Thought Content: Thought content normal.         Judgment: Judgment normal.         ASSESSMENT/PLAN     Mr. Jamil is a 74 y.o. male and  has a past medical history of Abdominal distension (gaseous) (03/23/2022), Abnormal findings on diagnostic imaging of heart and coronary circulation, Acute upper respiratory infection, unspecified (08/03/2022), Anemia, " Anxiety, Arrhythmia, Arthritis, Benign neoplasm of colon, unspecified (12/11/2012), Benign paroxysmal vertigo, unspecified ear (03/01/2017), Benign prostatic hyperplasia without lower urinary tract symptoms (08/05/2021), Body mass index (BMI) 27.0-27.9, adult (08/22/2019), Body mass index (BMI) 28.0-28.9, adult (10/28/2021), Cataract, COVID-19 (11/10/2022), COVID-19 virus infection (03/14/2023), Eczema, Encounter for follow-up examination after completed treatment for conditions other than malignant neoplasm (08/29/2018), Encounter for immunization (08/22/2019), Encounter for immunization (06/28/2017), Encounter for screening for malignant neoplasm of prostate (11/22/2014), Encounter for screening for malignant neoplasm of prostate (07/08/2018), Encounter for screening for other viral diseases (08/05/2021), GERD (gastroesophageal reflux disease), Impacted cerumen, bilateral (09/10/2020), Low back pain, unspecified (01/11/2022), Muscle spasm of back (01/11/2022), Other conditions influencing health status, Other enthesopathies, not elsewhere classified (03/09/2015), Other enthesopathy of right foot and ankle (12/07/2021), Other fecal abnormalities (03/09/2015), Other muscle spasm (09/04/2018), Other prurigo (04/06/2021), Other skin changes, Other skin changes due to chronic exposure to nonionizing radiation (03/29/2016), Other specified symptoms and signs involving the circulatory and respiratory systems (11/10/2022), Pain in left knee (09/03/2021), Pain in right knee (12/01/2020), Palpitations, Palpitations (01/31/2023), Personal history of colonic polyps, Personal history of diseases of the blood and blood-forming organs and certain disorders involving the immune mechanism (11/20/2013), Personal history of diseases of the skin and subcutaneous tissue, Personal history of diseases of the skin and subcutaneous tissue (10/28/2021), Personal history of other diseases of the circulatory system, Personal history of other  diseases of the digestive system, Personal history of other diseases of the digestive system (02/14/2019), Personal history of other diseases of the respiratory system (06/07/2019), Personal history of other diseases of the respiratory system (11/10/2022), Personal history of other drug therapy (05/21/2018), Personal history of other drug therapy (10/14/2016), Personal history of other endocrine, nutritional and metabolic disease, Personal history of other endocrine, nutritional and metabolic disease (08/22/2019), Personal history of other medical treatment, Personal history of other specified conditions (09/04/2018), Personal history of other specified conditions (06/08/2021), Personal history of other specified conditions (05/14/2019), Personal history of other specified conditions (02/12/2019), Personal history of other specified conditions (10/08/2015), Personal history of other specified conditions (11/10/2022), Personal history of pneumonia (recurrent) (11/10/2022), Personal history of pneumonia (recurrent), Pleural effusion (03/14/2023), Pneumonia (03/14/2023), Pruritus ani (08/23/2016), Pyuria (08/22/2019), Rales (03/14/2023), Shingles, Sleep apnea, Superficial mycosis, unspecified (10/08/2015), Unspecified abdominal hernia without obstruction or gangrene, Varicella, Ventricular premature depolarization (10/19/2021), Wheezing (03/14/2023), and Zoster without complications (08/10/2018). He was referred to the [facility] for evaluation of cough and allergic rhinitis     Problem List and Orders      Assessment and Plan / Recommendations:  Problem List Items Addressed This Visit       Allergic rhinitis    Relevant Orders    Referral to ENT    Chronic cough - Primary    Relevant Orders    Referral to ENT        Cough variant asthma - mild restrictive disease   Normal spirometry - No bronchodilator response. Lung volumes indicate a mild restrictive defect. Diffusion capacity  reduced    Eosinophils 550 - May 2024 , recent range 300-340  -  methacholine due to elevated eosinophils - no evidence of bronchial reactivity to max dose of methacholine  - refer to ENT for sinus and throat evaluation     GERD - controlled, had recent EGD    Pulm granulomas seen on CT Chest 8/2024   _ JOSIE negative     Had perforated esophagus in 1994 and has known scar tisues Stable findings of prior granulomatous disease -Mild stable scarring at the posterior right lung base.    -Moderate three-vessel left-sided coronary artery calcifications.    allergic rhinitis  - purchase nasal saline over the counter - use 2-3 x per day to rinse out nasal passages and keep them moist   - fluticasone (flonase) 1 spray each nostril 2 x per day - remember to aim towards your ear   - cont cetrizine daily at am and xyzal in the at night   - check RAST low allergy grass and alternata alternata and I Ge normal   - will give sample neilmed     Thank you for visiting the Pulmonary clinic today!       Return to clinic after 12 weeks and  or sooner if needed   Lisa Berry CNP  My office number is [phone] -     Call to schedule  for radiology - CT scans/PFTs etc at  [phone]  General scheduling  [phone]     Best way to get a hold of me is to call my office --> Please do not send me follow my health messages  Any test results will be discussed at next visit -- please make sure to make a follow up appt after testing.

## 2025-01-13 ENCOUNTER — HOSPITAL ENCOUNTER (OUTPATIENT)
Dept: RESPIRATORY THERAPY | Facility: HOSPITAL | Age: 75
Discharge: HOME | End: 2025-01-13
Payer: MEDICARE

## 2025-01-13 DIAGNOSIS — R05.3 CHRONIC COUGH: ICD-10-CM

## 2025-01-13 DIAGNOSIS — J30.89 ALLERGIC RHINITIS DUE TO OTHER ALLERGIC TRIGGER, UNSPECIFIED SEASONALITY: ICD-10-CM

## 2025-01-13 PROCEDURE — 94070 EVALUATION OF WHEEZING: CPT | Performed by: NURSE PRACTITIONER

## 2025-01-13 PROCEDURE — 2500000004 HC RX 250 GENERAL PHARMACY W/ HCPCS (ALT 636 FOR OP/ED)

## 2025-01-13 PROCEDURE — 95070 INHLJ BRNCL CHALLENGE TSTG: CPT

## 2025-01-16 ENCOUNTER — APPOINTMENT (OUTPATIENT)
Facility: CLINIC | Age: 75
End: 2025-01-16
Payer: MEDICARE

## 2025-01-16 VITALS
HEIGHT: 70 IN | WEIGHT: 200.4 LBS | SYSTOLIC BLOOD PRESSURE: 108 MMHG | BODY MASS INDEX: 28.69 KG/M2 | TEMPERATURE: 97.1 F | HEART RATE: 62 BPM | DIASTOLIC BLOOD PRESSURE: 67 MMHG | RESPIRATION RATE: 16 BRPM | OXYGEN SATURATION: 94 %

## 2025-01-16 DIAGNOSIS — J30.89 ALLERGIC RHINITIS DUE TO OTHER ALLERGIC TRIGGER, UNSPECIFIED SEASONALITY: ICD-10-CM

## 2025-01-16 DIAGNOSIS — R05.3 CHRONIC COUGH: Primary | ICD-10-CM

## 2025-01-16 DIAGNOSIS — J98.4 RESTRICTIVE LUNG DISEASE: ICD-10-CM

## 2025-01-16 PROCEDURE — 3008F BODY MASS INDEX DOCD: CPT | Performed by: NURSE PRACTITIONER

## 2025-01-16 PROCEDURE — 99214 OFFICE O/P EST MOD 30 MIN: CPT | Performed by: NURSE PRACTITIONER

## 2025-01-16 PROCEDURE — 1159F MED LIST DOCD IN RCRD: CPT | Performed by: NURSE PRACTITIONER

## 2025-01-16 PROCEDURE — 1157F ADVNC CARE PLAN IN RCRD: CPT | Performed by: NURSE PRACTITIONER

## 2025-01-16 NOTE — PATIENT INSTRUCTIONS
Cough variant asthma   Normal spirometry - No bronchodilator response. Lung volumes indicate a mild restrictive defect. Diffusion capacity reduced    Eosinophils 550 - May 2024 , recent range 300-340  -  methacholine due to elevated eosinophils - no evidence of bronchial reactivity to max dose of methacholine  - refer to ENT for sinus and throat evaluation     GERD - controlled, had recent EGD    Pulm granulomas seen on CT Chest 8/2024   _ JOSIE negative     Had perforated esophagus in 1994 and has known scar tisues Stable findings of prior granulomatous disease -Mild stable scarring at the posterior right lung base.    -Moderate three-vessel left-sided coronary artery calcifications.    allergic rhinitis  - purchase nasal saline over the counter - use 2-3 x per day to rinse out nasal passages and keep them moist   - fluticasone (flonase) 1 spray each nostril 2 x per day - remember to aim towards your ear   - cont cetrizine daily at am and xyzal in the at night   - check RAST low allergy grass and alternata alternata and I Ge normal   - will give sample neilmed     Thank you for visiting the Pulmonary clinic today!       Return to clinic after 12 weeks and  or sooner if needed   Lisa Berry CNP  My office number is [phone] -     Call to schedule  for radiology - CT scans/PFTs etc at  [phone]  General scheduling  [phone]     Best way to get a hold of me is to call my office --> Please do not send me follow my health messages  Any test results will be discussed at next visit -- please make sure to make a follow up appt after testing.

## 2025-01-17 ENCOUNTER — EVALUATION (OUTPATIENT)
Dept: PHYSICAL THERAPY | Facility: CLINIC | Age: 75
End: 2025-01-17
Payer: MEDICARE

## 2025-01-17 DIAGNOSIS — R26.9 GAIT DISTURBANCE: Primary | ICD-10-CM

## 2025-01-17 DIAGNOSIS — M67.874 ACHILLES TENDINOSIS OF LEFT ANKLE: ICD-10-CM

## 2025-01-17 DIAGNOSIS — M76.62 ACHILLES TENDINITIS OF LEFT LOWER EXTREMITY: ICD-10-CM

## 2025-01-17 PROCEDURE — 97161 PT EVAL LOW COMPLEX 20 MIN: CPT | Mod: GP | Performed by: PHYSICAL THERAPIST

## 2025-01-17 PROCEDURE — 97110 THERAPEUTIC EXERCISES: CPT | Mod: GP | Performed by: PHYSICAL THERAPIST

## 2025-01-17 ASSESSMENT — ENCOUNTER SYMPTOMS
OCCASIONAL FEELINGS OF UNSTEADINESS: 0
LOSS OF SENSATION IN FEET: 0
DEPRESSION: 0

## 2025-01-17 NOTE — PROGRESS NOTES
Physical Therapy    Physical Therapy Evaluation    Patient Name: Elpidio Jamil  MRN: [id number]  Today's Date: 1/17/2025  Visit:1  Referred by:Linh Goldsmith  Referred for:  Current Problem  Problem List Items Addressed This Visit             ICD-10-CM    Achilles tendinitis of left lower extremity M76.62    Relevant Orders    Follow Up In Physical Therapy    Achilles tendinosis of left ankle M67.874    Relevant Orders    Follow Up In Physical Therapy    Gait disturbance - Primary R26.9    Relevant Orders    Follow Up In Physical Therapy     General     Payor: [employer] / Plan: [employer] / Product Type: *No Product type* /   Time Calculation  Start Time: 0940  Stop Time: 1015  Time Calculation (min): 35 min  PT Evaluation Time Entry  PT Evaluation (Low) Time Entry: 20  PT Therapeutic Procedures Time Entry  Therapeutic Exercise Time Entry: 15          SUBJECTIVE  Precautions  Precautions  STEADI Fall Risk Score (The score of 4 or more indicates an increased risk of falling): 0     Pain       Chief complaint: Pt reports symptoms began a few months ago.  He was walking/jogging about 2 mi around the track and started getting pain in his heels.  Has seen ortho    Pain       Pain ranges:  0-5/10  Increases with: walking, jogging or sitting for a while and first steps  Decreases with: rest,   Prior level of function: Ind with ADLs  Current functional level:  Pain limits the patient's ability to run or job  Home set up: no concerns  Work status:retired clergy  Pt's stated goal: reduce pain and improve function  Significant PMH:    OBJECTIVE  Lower Extremity Strength:  Date EVAL     RIGHT LEFT   Hip Flexion 4 4+   Hip Extension     Hip Abduction 4 4   Hip Adduction 4 4   Knee Extension 4 4+   Knee Flexion 4 4+   Ankle DF 4 4   Ankle PF 4 4   Ankle INV 4 4-   Ankle EV 4 4     Lower Extremity ROM:  Date EVAL     RIGHT LEFT   Hip Flexion     Hip Extension     Hip IR     Hip ER     Knee  Extension     Knee Flexion     Ankle DF 5 5   Ankle PF 40 38   Ankle INV     Ankle EV       Joint mobility WNL  Posture hip ER with ambulation  Gait mechanics: hip ER with ambulation  Palpation no TTP today  Neurological symptoms none  Special tests:      Outcome Measures:  Other Measures  Lower Extremity Funtional Score (LEFS): 26     ASSESSMENT:  The pt presents with signs and symptoms consistent with the medical diagnosis of  gait dysfunction and achilles tendon.   The pt has impairments including decreased strength, decreased ROM, impaired posture, difficulty with gait, and pain.  The pt demonstrates functional limitations which include difficulty with stairs and community ambulation and recreational activities.  The pt will benefit from skilled physical therapy to reduce impairments in order to return to prior level of function.     The physical therapy prognosis is excellent for the patient to achieve their goals.   The pt tolerated therapy treatment today well with no adverse effects.  Personal factors that impact therapy include:  chronicity  Clinical presentation: Stable and Predictable   Level of clinical decision making is Low    PLAN  The pt will be seen 1 days a week for 4-8 weeks.    Medicare certification period: 1/17/2025 - 3/17/25     The pt has been educated about the risks and benefits of physical therapy including manual therapy treatments and gives consent for treatment.     The patient will benefit from physical therapy treatment to include: therapeutic exercises, therapeutic activities, neurological re-education, manual therapy, modalities, and a home exercise program.     The patient's potential to reach their therapy goals is excellent.     The evaluating therapist is prn and therefore the pt's POC may be re-assessed or discharged by another staff therapist at this location based on scheduling and availability.  Pt is aware and agrees.     TREATMENT:    Therapeutic ex:  Access Code:  [email or web address]  URL: [email or web address]  Date: 01/17/2025  Prepared by: Elio Blanco    Exercises  - Long Sitting Calf Stretch with Strap  - 1 x daily - 3 x weekly - 3 sets - 1 reps - 30 sec hold  - Seated Heel Slide  - 1 x daily - 3 x weekly - 3 sets - 10 reps  - Ankle Inversion Eversion Towel Slide  - 1 x daily - 3 x weekly - 3 sets - 10 reps  - Towel Scrunches  - 1 x daily - 3 x weekly - 3 sets - 10 reps  - Seated Heel Raise  - 1 x daily - 3 x weekly - 3 sets - 10 reps    Manual:  Pt educated about manual therapy risks and benefits.  Pt given opportunity to stop if needed.  Pt aware and agrees. NO adverse effects were noted. For assessment purposes during this evaluation.  Goals:  Active       PT Problem       Pt will have pain no higher than 2/10 for at least 2 weeks        Start:  01/17/25    Expected End:  04/17/25            Pt will have AROM of B ankle to at least 10 deg of DF to allow the pt to amb without pain         Start:  01/17/25    Expected End:  04/17/25            Pt will have strength of at least 4+/5 of B ankle to allow the pt to walk, run without difficulty        Start:  01/17/25    Expected End:  04/17/25            LEFS  to 36       Start:  01/17/25    Expected End:  04/17/25            Pt will be able to walk 2 mi without increased heel pain       Start:  01/17/25    Expected End:  04/17/25

## 2025-01-24 ENCOUNTER — TREATMENT (OUTPATIENT)
Dept: PHYSICAL THERAPY | Facility: CLINIC | Age: 75
End: 2025-01-24
Payer: MEDICARE

## 2025-01-24 DIAGNOSIS — M76.62 ACHILLES TENDINITIS OF LEFT LOWER EXTREMITY: ICD-10-CM

## 2025-01-24 DIAGNOSIS — R26.9 GAIT DISTURBANCE: ICD-10-CM

## 2025-01-24 DIAGNOSIS — M67.874 ACHILLES TENDINOSIS OF LEFT ANKLE: ICD-10-CM

## 2025-01-24 PROCEDURE — 97110 THERAPEUTIC EXERCISES: CPT | Mod: GP | Performed by: PHYSICAL THERAPIST

## 2025-01-24 NOTE — PROGRESS NOTES
"Physical Therapy    Physical Therapy Treatment    Patient Name: Elpidio Jamil  MRN: [id number]  Today's Date: 1/24/2025  Visit #2  Time Calculation  Start Time: 1010  Stop Time: 1055  Time Calculation (min): 45 min     PT Therapeutic Procedures Time Entry  Therapeutic Exercise Time Entry: 45        Payor: [employer]ICARE / Plan: [place] [employer] / Product Type: *No Product type* /   Referred by:Linh Goldsmith  Current Problem  Problem List Items Addressed This Visit             ICD-10-CM    Achilles tendinitis of left lower extremity M76.62    Achilles tendinosis of left ankle M67.874    Gait disturbance R26.9        Subjective   Pt reports he feels like the stretches have helped him.   Pain: Better  Pt has been compliant with HEP Yes      Objective  No objective measures assessed this visit    Assessment:  Pt is just beginning therapy.  No goals met. All exercises were new or carried forward from eval.   The pt required min to mod cues and demonstration to complete exercises with proper form.    Pt responded to all activities without adverse effects.    Pt continues to lack strength and flexibility necessary for the completion of baseline ADLs without pain.  Pt will benefit from further therapy to continue to progress towards meeting functional and impairment goals.     Plan:  Continue to progress treatment to improve strength, range of motion, joint mobility, pain, and flexibility impairments which are impacting patient's function.    Treatments:  Visit # 2    Eval date:1/17/25  Re-check due on:  Auth:  Precautions: None  Therapeutic ex:  Stepper 5 min  IB, HS stretch 1 min ea  HR and TR on black half roll x 20 ea  Step ups 6\" x 20  Step downs 4\" x 20  Shuttle DL 4 bands x 20, SL 3 bands x 20  Hip abd and ext GTB x 20  Ankle 4 ways GTB x 20  Neuro Re-ed:  NV Wobble board and airex  Manual:  Pt educated regarding manual therapy risks and benefits.  Pt given opportunity to stop if needed.  Pt " aware and agrees.   Access Code: [email or web address]  URL: [email or web address]  Date: 01/24/2025  Prepared by: Elio Blanco    Exercises  - Long Sitting Calf Stretch with Strap  - 1 x daily - 3 x weekly - 3 sets - 1 reps - 30 sec hold  - Seated Heel Slide  - 1 x daily - 3 x weekly - 3 sets - 10 reps  - Ankle Inversion Eversion Towel Slide  - 1 x daily - 3 x weekly - 3 sets - 10 reps  - Towel Scrunches  - 1 x daily - 3 x weekly - 3 sets - 10 reps  - Eccentric Heel Lowering on Step  - 1 x daily - 3 x weekly - 3 sets - 10 reps  - Step Up  - 1 x daily - 3 x weekly - 3 sets - 10 reps  - Forward Step Down Touch with Heel  - 1 x daily - 3 x weekly - 3 sets - 10 reps  - Hip Abduction with Resistance Loop  - 1 x daily - 3 x weekly - 3 sets - 10 reps  - Hip Extension with Resistance Loop  - 1 x daily - 3 x weekly - 3 sets - 10 reps  - Long Sitting Ankle Plantar Flexion with Resistance  - 1 x daily - 3 x weekly - 3 sets - 10 reps  - Long Sitting Ankle Eversion with Resistance  - 1 x daily - 3 x weekly - 3 sets - 10 reps  - Long Sitting Ankle Inversion with Resistance  - 1 x daily - 3 x weekly - 3 sets - 10 reps  - Long Sitting Ankle Dorsiflexion with Anchored Resistance  - 1 x daily - 3 x weekly - 3 sets - 10 reps  Goals:  Active       PT Problem       Pt will have pain no higher than 2/10 for at least 2 weeks        Start:  01/17/25    Expected End:  04/17/25            Pt will have AROM of B ankle to at least 10 deg of DF to allow the pt to amb without pain         Start:  01/17/25    Expected End:  04/17/25            Pt will have strength of at least 4+/5 of B ankle to allow the pt to walk, run without difficulty        Start:  01/17/25    Expected End:  04/17/25            LEFS  to 36       Start:  01/17/25    Expected End:  04/17/25            Pt will be able to walk 2 mi without increased heel pain       Start:  01/17/25    Expected End:  04/17/25

## 2025-01-27 NOTE — PROGRESS NOTES
HEARING AID CHECK    RIGHT: Phonak Audeo M70-R SN: [id number]  : 2 x M  Wax Guard/Dome: Cerushield, Large open  LEFT: Phonak Audeo M70-R SN: [id number]  : 2 x M  Wax Guard/Dome: Cerushield, Large open     Repair Warranty: out of warranty 8/15/2023  L&D Warranty: out of warranty 8/15/2023  HA Office Visits: out of warranty 8/15/2023    Elpidio Jamil was seen for a hearing aid check prior to Ent. Patient noted some concerns for decreased hearing from his hearing aids.  Otoscopy revealed minimal non occluding cerumen in the right and clear left canal. He additionally wanted his hearing aids reconnected to his phone. Listening check revealed good working function of devices.  Hearing aids were cleaned and domes and wax guards changed.  Hearing aids were reprogrammed to most recent audiometric evaluation though left at 110%. Hearing aids were repaired to patient's phone. Patient satisfied.    $35 HAC with programming (, H90.3)    RECOMMENDATIONS:  -Recommend patient return in ~6 months or sooner should concerns arise.    Lissy Oliveros, CCC-A    Appt time: 10:00 - 10:30 AM

## 2025-01-29 ENCOUNTER — TREATMENT (OUTPATIENT)
Dept: PHYSICAL THERAPY | Facility: CLINIC | Age: 75
End: 2025-01-29
Payer: MEDICARE

## 2025-01-29 DIAGNOSIS — M67.874 ACHILLES TENDINOSIS OF LEFT ANKLE: ICD-10-CM

## 2025-01-29 DIAGNOSIS — M76.62 ACHILLES TENDINITIS OF LEFT LOWER EXTREMITY: ICD-10-CM

## 2025-01-29 DIAGNOSIS — R26.9 GAIT DISTURBANCE: ICD-10-CM

## 2025-01-29 PROCEDURE — 97112 NEUROMUSCULAR REEDUCATION: CPT | Mod: GP | Performed by: PHYSICAL THERAPIST

## 2025-01-29 PROCEDURE — 97140 MANUAL THERAPY 1/> REGIONS: CPT | Mod: GP | Performed by: PHYSICAL THERAPIST

## 2025-01-29 PROCEDURE — 97110 THERAPEUTIC EXERCISES: CPT | Mod: GP | Performed by: PHYSICAL THERAPIST

## 2025-01-29 NOTE — PROGRESS NOTES
"Physical Therapy    Physical Therapy Treatment    Patient Name: Elpidio Jamil  MRN: [id number]  Today's Date: 1/29/2025  Visit #3  Time Calculation  Start Time: 1240  Stop Time: 1330  Time Calculation (min): 50 min     PT Therapeutic Procedures Time Entry  Manual Therapy Time Entry: 20  Neuromuscular Re-Education Time Entry: 8  Therapeutic Exercise Time Entry: 22        Payor: [employer] MEDICARE / Plan: [employer] MEDICARE / Product Type: *No Product type* /   Referred by:Linh Goldsmith  Current Problem  Problem List Items Addressed This Visit             ICD-10-CM    Achilles tendinitis of left lower extremity M76.62    Achilles tendinosis of left ankle M67.874    Gait disturbance R26.9        Subjective   Pt reports he really is not in any pain.  Doing well today  Pain: Better  Pt has been compliant with HEP Yes      Objective  No objective measures assessed this visit    Assessment:  Pt is progressing as expected towards goals. Trial of IASTM to improve tissue flexibility.  This session exercises were progressed (p) or added (NEW) as documented in the treatment section.  The pt required min to mod cues and demonstration to complete exercises with proper form.    Pt responded to all activities without adverse effects.    Pt continues to lack flexibility necessary for the completion of baseline ADLs without pain.  Pt will benefit from further therapy to continue to progress towards meeting functional and impairment goals.    Plan:  Continue to progress treatment to improve strength, range of motion, joint mobility, pain, and flexibility impairments which are impacting patient's function.    Treatments:  Visit # 3  Eval date:1/17/25  Re-check due on:  Auth:  Precautions: None  Therapeutic ex:  Stepper 5 min  IB, HS stretch 1 min ea  HR and TR on black half roll x 20 ea  Step ups 6\" x 20  Step downs 4\" x 20  Shuttle DL 4 bands x 20, SL 3 bands x 20  Hip abd and ext GTB x 20  Ecc step ups/downs 4\" x " 20  Ankle 4 ways GTB x 20  Neuro Re-ed:  Wobble board 2 min ea  Airex slow march 2 min  Tandem 1 min ea  Manual:  Pt educated regarding manual therapy risks and benefits.  Pt given opportunity to stop if needed.  Pt aware and agrees.   Access Code: [email or web address]  URL: [email or web address]  Date: 01/24/2025  Prepared by: Elio Blanco  Goals:  Active       PT Problem       Pt will have pain no higher than 2/10 for at least 2 weeks        Start:  01/17/25    Expected End:  04/17/25            Pt will have AROM of B ankle to at least 10 deg of DF to allow the pt to amb without pain         Start:  01/17/25    Expected End:  04/17/25            Pt will have strength of at least 4+/5 of B ankle to allow the pt to walk, run without difficulty        Start:  01/17/25    Expected End:  04/17/25            LEFS  to 36       Start:  01/17/25    Expected End:  04/17/25            Pt will be able to walk 2 mi without increased heel pain       Start:  01/17/25    Expected End:  04/17/25

## 2025-02-05 ENCOUNTER — APPOINTMENT (OUTPATIENT)
Dept: OTOLARYNGOLOGY | Facility: CLINIC | Age: 75
End: 2025-02-05
Payer: MEDICARE

## 2025-02-05 ENCOUNTER — APPOINTMENT (OUTPATIENT)
Dept: AUDIOLOGY | Facility: CLINIC | Age: 75
End: 2025-02-05
Payer: MEDICARE

## 2025-02-05 VITALS
DIASTOLIC BLOOD PRESSURE: 76 MMHG | HEART RATE: 58 BPM | WEIGHT: 200 LBS | SYSTOLIC BLOOD PRESSURE: 116 MMHG | BODY MASS INDEX: 28.63 KG/M2 | HEIGHT: 70 IN

## 2025-02-05 DIAGNOSIS — H61.23 BILATERAL IMPACTED CERUMEN: ICD-10-CM

## 2025-02-05 DIAGNOSIS — J34.3 HYPERTROPHY OF BOTH INFERIOR NASAL TURBINATES: ICD-10-CM

## 2025-02-05 DIAGNOSIS — H90.3 SENSORINEURAL HEARING LOSS (SNHL) OF BOTH EARS: Primary | ICD-10-CM

## 2025-02-05 DIAGNOSIS — J31.0 CHRONIC RHINITIS: ICD-10-CM

## 2025-02-05 DIAGNOSIS — R09.81 NASAL CONGESTION: ICD-10-CM

## 2025-02-05 DIAGNOSIS — R05.3 CHRONIC COUGH: ICD-10-CM

## 2025-02-05 DIAGNOSIS — J04.0 ACUTE LARYNGITIS: Primary | ICD-10-CM

## 2025-02-05 DIAGNOSIS — K21.9 LARYNGOPHARYNGEAL REFLUX (LPR): ICD-10-CM

## 2025-02-05 DIAGNOSIS — H90.0 CONDUCTIVE HEARING LOSS, BILATERAL: ICD-10-CM

## 2025-02-05 DIAGNOSIS — B49 FUNGAL INFECTION: ICD-10-CM

## 2025-02-05 PROCEDURE — 99215 OFFICE O/P EST HI 40 MIN: CPT | Performed by: STUDENT IN AN ORGANIZED HEALTH CARE EDUCATION/TRAINING PROGRAM

## 2025-02-05 PROCEDURE — 1036F TOBACCO NON-USER: CPT | Performed by: STUDENT IN AN ORGANIZED HEALTH CARE EDUCATION/TRAINING PROGRAM

## 2025-02-05 PROCEDURE — 1157F ADVNC CARE PLAN IN RCRD: CPT | Performed by: STUDENT IN AN ORGANIZED HEALTH CARE EDUCATION/TRAINING PROGRAM

## 2025-02-05 PROCEDURE — 1159F MED LIST DOCD IN RCRD: CPT | Performed by: STUDENT IN AN ORGANIZED HEALTH CARE EDUCATION/TRAINING PROGRAM

## 2025-02-05 PROCEDURE — 3008F BODY MASS INDEX DOCD: CPT | Performed by: STUDENT IN AN ORGANIZED HEALTH CARE EDUCATION/TRAINING PROGRAM

## 2025-02-05 PROCEDURE — V5267 HEARING AID SUP/ACCESS/DEV: HCPCS | Performed by: AUDIOLOGIST

## 2025-02-05 PROCEDURE — 69210 REMOVE IMPACTED EAR WAX UNI: CPT | Performed by: STUDENT IN AN ORGANIZED HEALTH CARE EDUCATION/TRAINING PROGRAM

## 2025-02-05 PROCEDURE — 31231 NASAL ENDOSCOPY DX: CPT | Performed by: STUDENT IN AN ORGANIZED HEALTH CARE EDUCATION/TRAINING PROGRAM

## 2025-02-05 RX ORDER — NYSTATIN 100000 [USP'U]/ML
5 SUSPENSION ORAL 4 TIMES DAILY
Qty: 200 ML | Refills: 0 | Status: SHIPPED | OUTPATIENT
Start: 2025-02-05 | End: 2025-02-15

## 2025-02-05 RX ORDER — FLUCONAZOLE 100 MG/1
100 TABLET ORAL DAILY
Qty: 14 TABLET | Refills: 0 | Status: SHIPPED | OUTPATIENT
Start: 2025-02-05 | End: 2025-02-19

## 2025-02-05 NOTE — PATIENT INSTRUCTIONS
"REFLUX (GERD / LPR) INFORMATION SHEET    TYPES OF REFLUX:  *  Gastroesophageal Reflux (GERD):  Backflow of stomach contents (acid) into the esophagus     ·  Symptoms:  Heartburn, regurgitation, swallowing problems  *  Laryngopharyngeal (LPR):  Backflow of stomach contents into the voice box and throat     ·  Symptoms:  Hoarseness, nonproductive throat clearing, cough, swallowing problems, sensation of \"lump\" in the throat, sudden shortness of breath or choking sensation         (especially at night)      WHY DON'T I HAVE HEARTBURN?  Many patients with LPRD do not experience significant heartburn (65%).  Heartburn occurs when the tissue in the esophagus becomes irritated.  The lining in the larynx (voice box) and the upper throat does not have as strong a protective lining as the esophagus, therefore, it is more sensitive to stomach acid.     WHAT CAN I DO TO REDUCE REFLUX?  *   Reduce Stress:  Even a moderate amount of stress can dramatically increase the amount of reflux.    *   Diet:  Try to maintain a healthy body weight.  Eat sensible, moderate amounts.  Eat meals at least 3 hours before bedtime. Avoid bedtime snacks.  Certain foods have been         shown to cause reflux including:     ·  Spicy, acidic and greasy foods     ·  Tomato based foods (spaghetti sauce, Mexican foods, etc.)     ·  Acidic fruit and juices (orange, tomato, cranberry juice, grapefruit, etc.)     ·  Caffeine (tea, coffee, soda, chocolate)     ·  Alcohol     ·  Dairy products     ·  Peppermint    *   Bedtime:  Elevate the head of your bed with 4-6 inch blocks.  Do not elevate your head with extra pillows as this can worsen reflux.  If the entire head of the bed is tilted        upwards, gravity reduces the backflow of acid.   *   Clothing:  Avoid tight belts and other restrictive clothing  *   Smoking:  Avoid smoking and second-hand smoke as this dramatically increases reflux   *   Medications:  NSAIDS (ex. Advil/Motrin type meds), aspirin, " "steroids can aggravate reflux  *   Medical Therapy:     ·  H2 receptor antagonists - cimetidine, ranitidine, famotidine, etc.            o Absorption is reduced 10 to 20 percent by associated antacid administration (ex. Tums)            o Achieve less acid suppression than proton pump inhibitors     ·  Proton pump inhibitor - omeprazole, lansoprazole, pantoprazole, etc.            o PPI's most effective when taken 30 to 60 minutes before meals on an empty stomach            o PPI's can effect absorption of medications (Plavix, Coumadin, etc) so check with your pharmacist prior to initiating therapy for any interactions      ·  Alginates - Gaviscon and Gaviscon Advance              o Block the stomach lining by forming a physical barrier    Once on medical therapy for reflux it can take weeks or months for symptom improvement or resolution as underlying damaged tissue heals (think about a burn on your arm…will take a long time to completely heal).  Persistent symptoms despite appropriate, consistent medical therapy should prompt an evaluation by gastroenterology.        \"[facility] is pleased to meet your health care needs.  We strive to deliver the highest quality and most value based care possible.  Thank you for giving us the opportunity to serve you.\"     "

## 2025-02-05 NOTE — PROGRESS NOTES
Assessment  Coughing  Fungal laryngitis  Laryngopharyngeal reflux  Chronic rhinitis  Bilateral sensorineural hearing loss  Cerumen impaction     Plan  -Coughing, fungal laryngitis, LPR  FNP notable for fungal elements along the larynx and base of tongue in addition to LPR changes.  The condition was reviewed and explained, fluconazole in addition to nystatin course prescribed.  Dietary modifications for reflux control in addition to OTC Gaviscon use discussed.  Takes lansoprazole in the mornings which he will continue    - CR  The patient and I discussed their current nasal / sinus symptoms as well as several therapeutic options.   No evidence of mucopurulence noted on nasal endoscopy.  He will continue his current  nasal medical regimen consisting of daily Flonase use.  He was advised not to inhale the topical steroid.   The patient was instructed on the correct technique to administer the topical nasal spray (s) aiming at the lateral nasal wall for maximal efficacy and to avoid irritation to the septum which could lead to epistaxis. I stressed consistency of usage for maximal benefit. We discussed the rationale for the timing of this therapy and the benefits and potential adverse effects.        - Bilateral sensorineural hearing loss  MRI 6/24/22: No CPA/ IAC lesions/ masses   Bilateral cerumen impaction removed, preventive measure discussed  Audiogram 6/17/24 notable for bilateral sensorineural hearing loss with slight progression in his left ear at 3000 Hz, otherwise minimal changes.  Using hearing aids with benefit  We will monitor his hearing with yearly audiograms    RTC 6w           History of Present Illness  2/5/25  Develop bilateral cerumen buildup, otherwise no otologic complaints.  In addition the patient endorses increased episodes of coughing/throat clearing for the past 4 weeks.  Has been using inhalers for lower airway reactivity/asthma.  Was evaluated in the ED earlier this month, CXR showed no  acute cardiopulmonary process.   Has a history of pulmonary granulomatosis.  Was evaluated by pulmonology, normal spirometry.    In addition the patient has a history of chronic rhinitis for which he uses Flonase.  Denies current facial pressure, congestion or nasal drainage.  Has a long history of reflux for which she uses lansoprazole in the morning.  Endorses throat clearing and occasional heartburn.  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  Denies dysphagia, odynophagia, fevers, chills, night sweats, or bleeding/hemoptysis.  6/17/24  The patient present for follow-up, endorses right cerumen buildup, otherwise no otologic complaints.  Using hearing aids with benefit.  8/16/22 telehealth visit  MRI IAC reviewed with the patient, no evidence of CN VII/VIII pathology noted. Currently using hearing aids with benefit. No otologic complaints.  Recall   72-year-old male presenting for evaluation of hearing loss and right cerumen impaction. The patient has a history of bilateral sensorineural hearing loss, uses hearing aids with benefits. He is due for his yearly audiogram. The patient reports cerumen buildup along his right EAC for several months which can interfere with his hearing aid use. No other otologic complaint.  Denies ear pain, vertigo, itching, discharge from ear, aural fullness or autophony.   Denies past otological history including history of prior ear surgery, noise exposure, exposure to ototoxic drugs or agents, and/or family history of hearing loss.    Past Medical History:   Diagnosis Date    Abdominal distension (gaseous) 03/23/2022    Abdominal bloating    Abnormal findings on diagnostic imaging of heart and coronary circulation     Abnormal computed tomography angiography of heart    Acute upper respiratory infection, unspecified 08/03/2022    Upper respiratory infection with cough and congestion    Anemia     Anxiety     Arrhythmia     Arthritis     Benign neoplasm of  colon, unspecified 12/11/2012    Benign neoplasm of large intestine    Benign paroxysmal vertigo, unspecified ear 03/01/2017    BPV (benign positional vertigo)    Benign prostatic hyperplasia without lower urinary tract symptoms 08/05/2021    Benign prostatic hyperplasia without lower urinary tract symptoms    Body mass index (BMI) 27.0-27.9, adult 08/22/2019    BMI 27.0-27.9,adult    Body mass index (BMI) 28.0-28.9, adult 10/28/2021    BMI 28.0-28.9,adult    Cataract     COVID-19 11/10/2022    COVID-19 virus infection    COVID-19 virus infection 03/14/2023    Eczema     Encounter for follow-up examination after completed treatment for conditions other than malignant neoplasm 08/29/2018    Hospital discharge follow-up    Encounter for immunization 08/22/2019    Need for shingles vaccine    Encounter for immunization 06/28/2017    Need for Tdap vaccination    Encounter for screening for malignant neoplasm of prostate 11/22/2014    Special screening examination for neoplasm of prostate    Encounter for screening for malignant neoplasm of prostate 07/08/2018    Screening for prostate cancer    Encounter for screening for other viral diseases 08/05/2021    Need for hepatitis C screening test    GERD (gastroesophageal reflux disease)     Impacted cerumen, bilateral 09/10/2020    Excessive ear wax, bilateral    Low back pain, unspecified 01/11/2022    Acute lumbar back pain    Muscle spasm of back 01/11/2022    Muscle spasm of back    Other conditions influencing health status     Pneumonia    Other enthesopathies, not elsewhere classified 03/09/2015    Tendonitis of shoulder    Other enthesopathy of right foot and ankle 12/07/2021    Tendinitis of right foot    Other fecal abnormalities 03/09/2015    Heme positive stool    Other muscle spasm 09/04/2018    Trapezius muscle spasm    Other prurigo 04/06/2021    Pruritic rash    Other skin changes     Skin irritation    Other skin changes due to chronic exposure to  nonionizing radiation 03/29/2016    Sun-damaged skin    Other specified symptoms and signs involving the circulatory and respiratory systems 11/10/2022    Rales    Pain in left knee 09/03/2021    Knee pain, left    Pain in right knee 12/01/2020    Pain and swelling of right knee    Palpitations     Heart palpitations    Palpitations 01/31/2023    Personal history of colonic polyps     History of colonic polyps    Personal history of diseases of the blood and blood-forming organs and certain disorders involving the immune mechanism 11/20/2013    History of anemia    Personal history of diseases of the skin and subcutaneous tissue     History of dermatitis    Personal history of diseases of the skin and subcutaneous tissue 10/28/2021    History of skin pruritus    Personal history of other diseases of the circulatory system     History of mitral valve disorder    Personal history of other diseases of the digestive system     History of gastroesophageal reflux (GERD)    Personal history of other diseases of the digestive system 02/14/2019    History of esophageal stricture    Personal history of other diseases of the respiratory system 06/07/2019    History of paranasal sinus congestion    Personal history of other diseases of the respiratory system 11/10/2022    History of pleural effusion    Personal history of other drug therapy 05/21/2018    History of pneumococcal vaccination    Personal history of other drug therapy 10/14/2016    History of influenza vaccination    Personal history of other endocrine, nutritional and metabolic disease     History of high cholesterol    Personal history of other endocrine, nutritional and metabolic disease 08/22/2019    History of iron deficiency    Personal history of other medical treatment     History of nuclear stress test    Personal history of other specified conditions 09/04/2018    History of chest pain    Personal history of other specified conditions 06/08/2021     History of headache    Personal history of other specified conditions 05/14/2019    History of chronic cough    Personal history of other specified conditions 02/12/2019    History of dysphagia    Personal history of other specified conditions 10/08/2015    History of hoarseness    Personal history of other specified conditions 11/10/2022    History of wheezing    Personal history of pneumonia (recurrent) 11/10/2022    History of pneumonia    Personal history of pneumonia (recurrent)     History of pneumonia    Pleural effusion 03/14/2023    Pneumonia 03/14/2023    Pruritus ani 08/23/2016    Perianal irritation    Pyuria 08/22/2019    Pyuria    Rales 03/14/2023    Shingles     Sleep apnea     Superficial mycosis, unspecified 10/08/2015    Fungal dermatitis    Unspecified abdominal hernia without obstruction or gangrene     Hernia    Varicella     Ventricular premature depolarization 10/19/2021    PVC (premature ventricular contraction)    Wheezing 03/14/2023    Zoster without complications 08/10/2018    Herpes zoster without complication       Past Surgical History:   Procedure Laterality Date    CIRCUMCISION, PRIMARY      COLONOSCOPY  04/26/2012    Colonoscopy (Fiberoptic)    ESOPHAGOGASTRODUODENOSCOPY  01/08/2015    Diagnostic Esophagogastroduodenoscopy    EYE SURGERY      HERNIA REPAIR  06/29/2015    Hernia Repair    HERNIA REPAIR  04/26/2012    Inguinal Hernia Repair    OTHER SURGICAL HISTORY  02/12/2019    Myringotomy    OTHER SURGICAL HISTORY  01/08/2015    Repair Of Esophageal Stricture    OTHER SURGICAL HISTORY  12/07/2021    Skin lesion excision    OTHER SURGICAL HISTORY  12/09/2021    Mohs surgery    OTHER SURGICAL HISTORY  04/26/2012    Evaluation Of Swallowing And Oral Function    OTHER SURGICAL HISTORY  04/26/2012    Chest Tube Insertion         Current Outpatient Medications on File Prior to Visit   Medication Sig Dispense Refill    aspirin 81 mg EC tablet Take 1 tablet (81 mg) by mouth once daily.  with food usually in the am      atenolol (Tenormin) 25 mg tablet Take 0.5 tablets (12.5 mg) by mouth once daily. 45 tablet 3    atorvastatin (Lipitor) 40 mg tablet TAKE 1 TABLET BY MOUTH ONCE  DAILY AT BEDTIME 90 tablet 3    CALCIUM CITRATE-VITAMIN D3 ORAL Take 1 tablet by mouth 1 (one) time each day.      cetirizine (ZYRTEC) 10 mg capsule Take 1 capsule (10 mg) by mouth once daily at bedtime. 90 capsule 3    cyanocobalamin, vitamin B-12, 1,000 mcg capsule Take 1 capsule by mouth 5 (five) times a week.      diclofenac sodium (Voltaren) 1 % gel Apply 2.25 inches (2 g) topically 3 times a day.      FLUoxetine (PROzac) 10 mg capsule Take 1 capsule (10 mg) by mouth once daily. with 40mg to equal 50mg daily 90 capsule 3    FLUoxetine (PROzac) 40 mg capsule TAKE 1 CAPSULE BY MOUTH ONCE  DAILY WITH 10 MG TO MAKE 50 MG 90 capsule 3    fluticasone (Flonase) 50 mcg/actuation nasal spray Administer 2 sprays into each nostril 2 times a day. 64 g 3    lansoprazole (Prevacid) 30 mg DR capsule Take one cap daily  30 minutes ac 90 capsule 3    levocetirizine (Xyzal) 5 mg tablet Take 1 tablet (5 mg) by mouth once daily in the evening. 30 tablet 11    omega-3 acid ethyl esters (Lovaza) 1 gram capsule Take 3 capsules (3 g) by mouth once daily. Take one cap in the am. And 2 caps in the pm.      psyllium husk-calcium (Metamucil Plus Calcium) 1-60 gram-mg capsule Take 5 capsules by mouth 1 (one) time each day.      solifenacin (VESIcare) 5 mg tablet Take 1 tablet (5 mg) by mouth once daily. Swallow tablet whole; do not crush, chew, or split. 90 tablet 3    traZODone (Desyrel) 50 mg tablet Take 1 tablet (50 mg) by mouth once daily at bedtime. 90 tablet 1    albuterol 90 mcg/actuation inhaler Inhale 2 puffs every 6 hours if needed for wheezing. 18 g 0     No current facility-administered medications on file prior to visit.        Allergies   Allergen Reactions    Nirmatrelvir-Ritonavir Nausea/vomiting     AKA.. Paxlovid        Review of  Systems  A detailed 12 point ROS was performed and is negative except as noted in the intake form, HPI and/or Past Medical History        Physical Exam   CONSTITUTIONAL: Well developed, well nourished.  VOICE: Normal voice quality  RESPIRATION: Breathing comfortably, no stridor.  CV: No clubbing/cyanosis/edema in hands.  EYES: EOM Intact, sclera normal.  NEURO: Alert and oriented times 3, Cranial nerves V,VII intact and symmetric bilaterally.  HEAD AND FACE: Symmetric facial features, no masses or lesions, sinuses nontender to palpation.  SALIVARY GLANDS: Parotid and submandibular glands normal bilaterally.  + EARS: Normal external ears  Bilateral EACs with cerumen impaction (removed/see procedure note)  Right EAC patent, tympanic membrane intact  Left EAC patent, tympanic membrane intact   NOSE: External nose midline, anterior rhinoscopy is normal with limited visualization to the anterior aspect of the interior turbinates. No lesions noted.  ORAL CAVITY/OROPHARYNX/LIPS: Normal mucous membranes, normal floor of mouth/tongue/OP, no masses or lesions are noted.  PHARYNGEAL WALLS AND NASOPHARYNX: No masses noted. Mucosa appears clean and moist  NECK/LYMPH: No LAD, no thyroid masses. Trachea palpably midline  SKIN: Neck skin is without injury  PSYCH: Alert and oriented with appropriate mood and affect     Procedure: Removal of impacted cerumen, bilateral  Indication: Cerumen impaction.   The procedure was reviewed and explained.  Verbal consent was obtained.  With the use of the microscope and speculum the right ear was examined, cerumen was cleaned with the use of curettes and suction.  Attention was turned to the left ear, with the use of the microscope and speculum the left ear was examined, cerumen was cleaned with the use of curettes and suction.   Hearing returned to baseline bilaterally following cerumen removal.  The patient tolerated the procedure well.       Nasal endoscopy:  PROCEDURE NOTE    For better  visualization because of septal deviation, turbinate hypertrophy nasal endoscopy was performed after verbal consent was obtained by the patient and/or guardian. Both nostrils were sprayed with a mixture of lidocaine 4% and Afrin. After a sufficient amount of time elapsed for mucosal anesthesia to take place, the nasal endoscope was advanced into the nostril.    The following areas were visualized:  Nasal passage, nasal septum, turbinates, middle meatus, nasopharynx, sinus ostia    The patient tolerated the procedure well and these structures were found to be normal except as follows:  Bilateral inferior turbinate hypertrophy  Left septal deviation   No mucopurulence, polyps, nor masses seen in inferior meati, middle meati, nor sphenoethmoidal recesses bilaterally.     PROCEDURE NOTE:  FLEXIBLE NASOLARYNGOSCOPY     The risks, benefits, and alternatives were explained.  The patient wished to proceed and provided verbal consent.       INDICATIONS: To visualize anatomy in specific detail; evaluation of symptomatic disorder involving the voice, swallowing, or upper aerodigestive tract, including IVONNE.      DESCRIPTION OF PROCEDURE: After adequate afrin and lidocaine spray, I advanced the endoscope into the nasal cavity.  The nasal cavity, nasopharynx, tongue base, vallecula, posterior/lateral pharyngeal walls, pyriform, epiglottis, post cricoid area, and larynx were within normal limits.  The following specific findings should be noted:  No lesions/masses  Mobile TVCs bilaterally, complete glottic closure  Fungal elements along the base of tongue and arytenoids bilaterally  No pooling of secretions  The patient tolerated the procedure without difficulty.     Results:   Audiogram 6/17/24 reviewed  Right: Normal hearing up to 2000 Hz sloping to moderate sensorineural hearing loss.  Speech discrimination score 100%.  Type a tympanogram.  Left: Normal hearing up to 2000 Hz sloping to moderate sensorineural hearing loss at  4000 Hz improving to normal hearing at 8000 Hz.  Speech discrimination score 100%.  Type a tympanogram.

## 2025-02-07 ENCOUNTER — TREATMENT (OUTPATIENT)
Dept: PHYSICAL THERAPY | Facility: CLINIC | Age: 75
End: 2025-02-07
Payer: MEDICARE

## 2025-02-07 DIAGNOSIS — R26.9 GAIT DISTURBANCE: ICD-10-CM

## 2025-02-07 DIAGNOSIS — M76.62 ACHILLES TENDINITIS OF LEFT LOWER EXTREMITY: ICD-10-CM

## 2025-02-07 DIAGNOSIS — M67.874 ACHILLES TENDINOSIS OF LEFT ANKLE: ICD-10-CM

## 2025-02-07 PROCEDURE — 97110 THERAPEUTIC EXERCISES: CPT | Mod: GP | Performed by: PHYSICAL THERAPIST

## 2025-02-07 PROCEDURE — 97140 MANUAL THERAPY 1/> REGIONS: CPT | Mod: GP | Performed by: PHYSICAL THERAPIST

## 2025-02-07 NOTE — PROGRESS NOTES
"Physical Therapy    Physical Therapy Treatment    Patient Name: Elpidio Jamil  MRN: [id number]  Today's Date: 2/7/2025  Visit #4  Time Calculation  Start Time: 1012  Stop Time: 1058  Time Calculation (min): 46 min     PT Therapeutic Procedures Time Entry  Manual Therapy Time Entry: 16  Therapeutic Exercise Time Entry: 30        Payor: [employer] / Plan: [employer] / Product Type: *No Product type* /   Referred by:Linh Goldsmith  Current Problem  Problem List Items Addressed This Visit             ICD-10-CM    Achilles tendinitis of left lower extremity M76.62    Achilles tendinosis of left ankle M67.874    Gait disturbance R26.9        Subjective   Pt reports he felt OK after last session. He went hiking Sunday on ice and snow and reports an increase in symptoms following  Pain: No change  Pt has been compliant with HEP Yes      Objective  No objective measures assessed this visit    Assessment:  Pt is progressing as expected towards goals. Pt had some edema in L achilles tendon. Applied kinesio to reduce edema. Noted decreased toe ext  This session exercises were progressed (p) or added (NEW) as documented in the treatment section.  The pt required min to mod cues and demonstration to complete exercises with proper form.    Pt responded to all activities without adverse effects.    Pt continues to lack flexibility, posture and strength necessary for the completion of baseline ADLs without pain.  Pt will benefit from further therapy to continue to progress towards meeting functional and impairment goals.    Plan:  Continue to progress treatment to improve strength, range of motion, joint mobility, pain, and flexibility impairments which are impacting patient's function.    Treatments:  Visit # 4  Eval date:1/17/25  Re-check due on:  Auth:  Precautions: None  Therapeutic ex:  Stepper 5 min  IB, HS stretch 1 min ea  HR and TR 4\" step x 20  Step ups 6\" x 20  Step downs 4\" x " "20  Shuttle DL 5 bands x 20, SL 4 bands x 20  Hip abd and ext GTB x 20  Ecc step ups/downs 4\" x 20  Ankle 4 ways GTB x 20 hELD  Applied kinesiotape 2 min  Neuro Re-ed:  Wobble board 2 min ea  Airex slow march 2 min HELD  Tandem 1 min ea  Manual:  Pt educated regarding manual therapy risks and benefits.  Pt given opportunity to stop if needed.  Pt aware and agrees.   IASTM to calf to reduce tissue tightness  Access Code: [email or web address]  URL: [email or web address]  Date: 01/24/2025  Prepared by: Elio Blanco  Goals:  Active       PT Problem       Pt will have pain no higher than 2/10 for at least 2 weeks        Start:  01/17/25    Expected End:  04/17/25            Pt will have AROM of B ankle to at least 10 deg of DF to allow the pt to amb without pain         Start:  01/17/25    Expected End:  04/17/25            Pt will have strength of at least 4+/5 of B ankle to allow the pt to walk, run without difficulty        Start:  01/17/25    Expected End:  04/17/25            LEFS  to 36       Start:  01/17/25    Expected End:  04/17/25            Pt will be able to walk 2 mi without increased heel pain       Start:  01/17/25    Expected End:  04/17/25               "

## 2025-02-14 ENCOUNTER — APPOINTMENT (OUTPATIENT)
Dept: PHYSICAL THERAPY | Facility: CLINIC | Age: 75
End: 2025-02-14
Payer: MEDICARE

## 2025-02-14 DIAGNOSIS — M76.62 ACHILLES TENDINITIS OF LEFT LOWER EXTREMITY: ICD-10-CM

## 2025-02-14 DIAGNOSIS — M67.874 ACHILLES TENDINOSIS OF LEFT ANKLE: ICD-10-CM

## 2025-02-14 DIAGNOSIS — R26.9 GAIT DISTURBANCE: ICD-10-CM

## 2025-02-14 PROCEDURE — 97140 MANUAL THERAPY 1/> REGIONS: CPT | Mod: GP | Performed by: PHYSICAL THERAPIST

## 2025-02-14 PROCEDURE — 97110 THERAPEUTIC EXERCISES: CPT | Mod: GP | Performed by: PHYSICAL THERAPIST

## 2025-02-14 NOTE — PROGRESS NOTES
"Physical Therapy    Physical Therapy Treatment    Patient Name: Elpidio Jamil  MRN: [id number]  Today's Date: 2/14/2025  Visit #5  Time Calculation  Start Time: 1000  Stop Time: 1048  Time Calculation (min): 48 min     PT Therapeutic Procedures Time Entry  Manual Therapy Time Entry: 18  Therapeutic Exercise Time Entry: 30        Payor: [employer]ICARE / Plan: [employer]ICARE / Product Type: *No Product type* /   Referred by:Linh Goldsmith  Current Problem  Problem List Items Addressed This Visit             ICD-10-CM    Achilles tendinitis of left lower extremity M76.62    Achilles tendinosis of left ankle M67.874    Gait disturbance R26.9        Subjective   Pt reports he did have the tape on a for a few days  Pain: Better  Pt has been compliant with HEP pt reports he is not very complaint    Objective  No objective measures assessed this visit    Assessment:  Pt is progressing as expected towards goals. Pt is progressing as expected.  Still having pain in the achilles but making some progress.   This session exercises were progressed (p) or added (NEW) as documented in the treatment section.  The pt required min to mod cues and demonstration to complete exercises with proper form.    Pt responded to all activities without adverse effects.    Pt continues to lack strength and has pain with activities.  Pt will benefit from further therapy to continue to progress towards meeting functional and impairment goals.    Plan:  Continue to progress treatment to improve strength, range of motion, joint mobility, pain, and flexibility impairments which are impacting patient's function.    Treatments:  Visit # 5  Eval date:1/17/25  Re-check due on:  Auth:  Precautions: None  Therapeutic ex:  Stepper 5 min  IB, HS stretch 1 min ea  HR and TR 4\" step 3 x 10   Step ups 6\" x 20 with opp knee raise  Step downs 4\" x 20  Shuttle DL 5 bands x 20, SL 4 bands x 20  Hip abd and ext GTB x 20  Ecc step ups/downs " "4\" x 20  Ankle 4 ways GTB x 20 hELD  Applied kinesiotape 2 min  Neuro Re-ed:  Wobble board 2 min ea  Airex slow march 2 min HELD  Tandem 1 min ea HELD   Manual:  Pt educated regarding manual therapy risks and benefits.  Pt given opportunity to stop if needed.  Pt aware and agrees.   IASTM to calf to reduce tissue tightness  Access Code: [email or web address]  URL: [email or web address]  Date: 01/24/2025  Prepared by: Elio Blanco  Goals:  Active       PT Problem       Pt will have pain no higher than 2/10 for at least 2 weeks        Start:  01/17/25    Expected End:  04/17/25            Pt will have AROM of B ankle to at least 10 deg of DF to allow the pt to amb without pain         Start:  01/17/25    Expected End:  04/17/25            Pt will have strength of at least 4+/5 of B ankle to allow the pt to walk, run without difficulty        Start:  01/17/25    Expected End:  04/17/25            LEFS  to 36       Start:  01/17/25    Expected End:  04/17/25            Pt will be able to walk 2 mi without increased heel pain       Start:  01/17/25    Expected End:  04/17/25               "

## 2025-02-19 ENCOUNTER — APPOINTMENT (OUTPATIENT)
Dept: PHYSICAL THERAPY | Facility: CLINIC | Age: 75
End: 2025-02-19
Payer: MEDICARE

## 2025-02-19 DIAGNOSIS — R26.9 GAIT DISTURBANCE: ICD-10-CM

## 2025-02-19 DIAGNOSIS — M76.62 ACHILLES TENDINITIS OF LEFT LOWER EXTREMITY: ICD-10-CM

## 2025-02-19 DIAGNOSIS — M67.874 ACHILLES TENDINOSIS OF LEFT ANKLE: ICD-10-CM

## 2025-02-19 PROCEDURE — 97110 THERAPEUTIC EXERCISES: CPT | Mod: GP | Performed by: PHYSICAL THERAPIST

## 2025-02-19 PROCEDURE — 97140 MANUAL THERAPY 1/> REGIONS: CPT | Mod: GP | Performed by: PHYSICAL THERAPIST

## 2025-02-19 NOTE — PROGRESS NOTES
Physical Therapy    Physical Therapy Treatment and Re-assessment    Patient Name: Elpidio Jamil  MRN: [id number]  Today's Date: 2/19/2025  Visit #6  Time Calculation  Start Time: 0758  Stop Time: 0847  Time Calculation (min): 49 min     PT Therapeutic Procedures Time Entry  Manual Therapy Time Entry: 17  Therapeutic Exercise Time Entry: 32        Payor: [employer] / Plan: [employer]IC[employer] / Product Type: *No Product type* /   Current Problem  Problem List Items Addressed This Visit             ICD-10-CM    Achilles tendinitis of left lower extremity M76.62    Achilles tendinosis of left ankle M67.874    Gait disturbance R26.9        Subjective   Pt reports his left foot still hurts  Pain: unknown  Pt has been compliant with HEP Yes    Objective:  See goals for updated objective measures and progress since eval  Lower Extremity Strength:  Date EVAL   2/19/25      RIGHT LEFT RIGHT LEFT   Hip Flexion 4 4+ 4+ 4+   Hip Extension         Hip Abduction 4 4 4 4   Hip Adduction 4 4 4 4   Knee Extension 4 4+ 4+ 4+   Knee Flexion 4 4+ 4+ 4+   Ankle DF 4 4 4 4   Ankle PF 4 4 4 4   Ankle INV 4 4- 4 4   Ankle EV 4 4 4 4      Lower Extremity ROM:  Date EVAL   2/19/25      RIGHT LEFT RIGHT LEFT   Ankle DF 5 5 8 8   Ankle PF 40 38 45 40     LEFS improved from 26/80 to 63/80  Assessment:  Pt was has completed 6 visits. Pt has progressed well.  The pt has reduced their pain minimally and improved their function. The pt has progressed towards goals but continues to lack Rom and strength.  HE continues to have pain in the achilles with activity. Pt will benefit from further therapy to continue to progress towards meeting goals and improving function.   Anticipate 2 more visits  Plan:  Continue to progress treatment to improve strength, range of motion, joint mobility, pain, and flexibility impairments which are impacting patient's function.    Treatments:  Eval date:1/17/25  Re-check due  "on:  Auth:  Precautions: None  Therapeutic ex:  Stepper 5 min  IB, HS stretch, runners stretch 1 min ea  HR and TR 4\" step 3 x 10   Step ups 6\" x 20 with opp knee raise  Step downs 4\" x 20  Shuttle DL 5 bands x 30, SL 4 bands x 30  Hip abd and ext GTB x 20  Ankle 4 ways GTB x 20 hELD  Applied kinesiotape 2 min  Neuro Re-ed:  Wobble board 2 min ea  Airex slow march 2 min HELD  Tandem 1 min ea HELD   Manual:  Pt educated regarding manual therapy risks and benefits.  Pt given opportunity to stop if needed.  Pt aware and agrees.   IASTM to calf to reduce tissue tightness  Access Code: [email or web address]  URL: [email or web address]  Date: 01/24/2025  Prepared by: Elio Blanco  Goals:  Active       PT Problem       Pt will have pain no higher than 2/10 for at least 2 weeks  (Not Progressing)       Start:  01/17/25    Expected End:  04/17/25            Pt will have AROM of B ankle to at least 10 deg of DF to allow the pt to amb without pain   (Progressing)       Start:  01/17/25    Expected End:  04/17/25            Pt will have strength of at least 4+/5 of B ankle to allow the pt to walk, run without difficulty  (Progressing)       Start:  01/17/25    Expected End:  04/17/25            LEFS  to 36       Start:  01/17/25    Expected End:  04/17/25            Pt will be able to walk 2 mi without increased heel pain (Progressing)       Start:  01/17/25    Expected End:  04/17/25                           "

## 2025-02-26 ENCOUNTER — APPOINTMENT (OUTPATIENT)
Dept: PHYSICAL THERAPY | Facility: CLINIC | Age: 75
End: 2025-02-26
Payer: MEDICARE

## 2025-02-26 DIAGNOSIS — M67.874 ACHILLES TENDINOSIS OF LEFT ANKLE: ICD-10-CM

## 2025-02-26 DIAGNOSIS — J30.89 NON-SEASONAL ALLERGIC RHINITIS DUE TO OTHER ALLERGIC TRIGGER: ICD-10-CM

## 2025-02-26 DIAGNOSIS — E78.2 ELEVATED TRIGLYCERIDES WITH HIGH CHOLESTEROL: ICD-10-CM

## 2025-02-26 DIAGNOSIS — M76.62 ACHILLES TENDINITIS OF LEFT LOWER EXTREMITY: ICD-10-CM

## 2025-02-26 DIAGNOSIS — R26.9 GAIT DISTURBANCE: ICD-10-CM

## 2025-02-26 PROCEDURE — 97110 THERAPEUTIC EXERCISES: CPT | Mod: GP | Performed by: PHYSICAL THERAPIST

## 2025-02-26 PROCEDURE — 97140 MANUAL THERAPY 1/> REGIONS: CPT | Mod: GP | Performed by: PHYSICAL THERAPIST

## 2025-02-26 RX ORDER — FLUTICASONE PROPIONATE 50 MCG
2 SPRAY, SUSPENSION (ML) NASAL 2 TIMES DAILY
Qty: 64 G | Refills: 3 | Status: SHIPPED | OUTPATIENT
Start: 2025-02-26

## 2025-02-26 RX ORDER — OMEGA-3-ACID ETHYL ESTERS 1 G/1
3 CAPSULE, LIQUID FILLED ORAL DAILY
Qty: 270 CAPSULE | Refills: 1 | Status: SHIPPED | OUTPATIENT
Start: 2025-02-26

## 2025-02-26 RX ORDER — ATORVASTATIN CALCIUM 40 MG/1
40 TABLET, FILM COATED ORAL NIGHTLY
Qty: 90 TABLET | Refills: 3 | Status: SHIPPED | OUTPATIENT
Start: 2025-02-26

## 2025-02-26 NOTE — PROGRESS NOTES
"Physical Therapy    Physical Therapy Treatment    Patient Name: Elpidio Jamil  MRN: [id number]  Today's Date: 2/26/2025  Visit #7  Time Calculation  Start Time: 0757  Stop Time: 0846  Time Calculation (min): 49 min     PT Therapeutic Procedures Time Entry  Manual Therapy Time Entry: 14  Therapeutic Exercise Time Entry: 35        Payor: [employer] MEDICARE / Plan: [employer] MEDICARE / Product Type: *No Product type* /   Referred by:Linh Goldsmith  Current Problem  Problem List Items Addressed This Visit             ICD-10-CM    Achilles tendinitis of left lower extremity M76.62    Achilles tendinosis of left ankle M67.874    Gait disturbance R26.9        Subjective   Pt reports he walked a mile on the track, did the leg press and rode the bike and felt OK.  This morning he had a little pain.   Pain: Better  Pt has been compliant with HEP Yes      Objective  No objective measures assessed this visit    Assessment:  Pt is progressing as expected towards goals. Pt has a little less pain.  He was able to return to the gym on a modified exercise schedule  This session exercises were progressed (p) or added (NEW) as documented in the treatment section.  The pt required min to mod cues and demonstration to complete exercises with proper form.    Pt responded to all activities without adverse effects.    Pt continues to lack strength necessary for the completion of baseline ADLs without pain.  Pt will benefit from further therapy to continue to progress towards meeting functional and impairment goals.    Plan:  Continue to progress treatment to improve strength, range of motion, joint mobility, pain, and flexibility impairments which are impacting patient's function.    Treatments:  Visit # 7  Re-check: 02/19/25  Auth:  Precautions: None  Therapeutic ex:  Stepper 5 min  IB, HS stretch, runners stretch 1 min ea  HR and TR 4\" step 3 x 10   Step ups 6\" x 20 with opp knee raise  Step downs 4\" x 20  Shuttle DL 5 " bands x 30, SL 4 bands x 30  Hip abd and ext GTB x 20  Ankle 4 ways GTB x 20 hELD  Applied kinesiotape 2 min  Neuro Re-ed:  Wobble board 2 min ea  Airex slow march 2 min HELD  Tandem 1 min ea HELD   Lunge to bosu x 20 (difficult)  Manual:  Pt educated regarding manual therapy risks and benefits.  Pt given opportunity to stop if needed.  Pt aware and agrees.   IASTM to calf to reduce tissue tightness  Access Code: [email or web address]  URL: [email or web address]  Date: 01/24/2025  Prepared by: Elio KEN program given  Goals:  Active       PT Problem       Pt will have pain no higher than 2/10 for at least 2 weeks  (Not Progressing)       Start:  01/17/25    Expected End:  04/17/25            Pt will have AROM of B ankle to at least 10 deg of DF to allow the pt to amb without pain   (Progressing)       Start:  01/17/25    Expected End:  04/17/25            Pt will have strength of at least 4+/5 of B ankle to allow the pt to walk, run without difficulty  (Progressing)       Start:  01/17/25    Expected End:  04/17/25            LEFS  to 36       Start:  01/17/25    Expected End:  04/17/25            Pt will be able to walk 2 mi without increased heel pain (Progressing)       Start:  01/17/25    Expected End:  04/17/25

## 2025-03-05 ENCOUNTER — APPOINTMENT (OUTPATIENT)
Dept: PHYSICAL THERAPY | Facility: CLINIC | Age: 75
End: 2025-03-05
Payer: MEDICARE

## 2025-03-05 DIAGNOSIS — E78.2 ELEVATED TRIGLYCERIDES WITH HIGH CHOLESTEROL: ICD-10-CM

## 2025-03-05 DIAGNOSIS — M76.62 ACHILLES TENDINITIS OF LEFT LOWER EXTREMITY: ICD-10-CM

## 2025-03-05 DIAGNOSIS — R26.9 GAIT DISTURBANCE: ICD-10-CM

## 2025-03-05 DIAGNOSIS — M67.874 ACHILLES TENDINOSIS OF LEFT ANKLE: ICD-10-CM

## 2025-03-05 PROCEDURE — 97110 THERAPEUTIC EXERCISES: CPT | Mod: GP | Performed by: PHYSICAL THERAPIST

## 2025-03-05 PROCEDURE — 97140 MANUAL THERAPY 1/> REGIONS: CPT | Mod: GP | Performed by: PHYSICAL THERAPIST

## 2025-03-05 NOTE — PROGRESS NOTES
Physical Therapy    Physical Therapy Treatment and Discharge    Patient Name: Elpidio Jamil  MRN: [id number]  Today's Date: 3/5/2025  Visit #8  Time Calculation  Start Time: 0759  Stop Time: 0847  Time Calculation (min): 48 min     PT Therapeutic Procedures Time Entry  Manual Therapy Time Entry: 16  Therapeutic Exercise Time Entry: 32        Payor: [employer] MEDICARE / Plan: [employer] MEDICARE / Product Type: *No Product type* /   Current Problem  Problem List Items Addressed This Visit             ICD-10-CM    Achilles tendinitis of left lower extremity M76.62    Achilles tendinosis of left ankle M67.874    Gait disturbance R26.9        Subjective   Pt reports he does not really notice the pain much anymore.  Has been walking with the dog.  Has been paying attention to not look down and to keep his foot straight.   Pain: Better  Pt has been compliant with HEP Yes    Objective:  See goals for updated objective measures and progress since eval  Lower Extremity Strength:  Date EVAL   3/5/25      RIGHT LEFT RIGHT LEFt   Ankle DF 4 4 4+ 4+   Ankle PF 4 4 4+ 4+   Ankle INV 4 4- 4+ 4+   Ankle EV 4 4 4+ 4+      Lower Extremity ROM:  Date EVAL   3/5/25      RIGHT LEFT RIGHT LEFT   Ankle DF 5 5 8 10   Ankle PF 40 38 43 45      Outcome Measures:  Other Measures  Lower Extremity Funtional Score (LEFS): 67/80     Assessment:  Pt was referred for achilles tendinopathy  by Linh Goldsmith  The pt was initially seen on 1/17/25 and has completed  8 visits. Pt has progressed well.  The pt has reduced their pain, increased strength/ROM and improved their function. The pt has completed exercises, balance activities, and functional activities with instruction for correct progression to help achieve their goals.   The goals have all been met or mostly met. Pt has progressed towards independence  and is currently safe to continue with their HEP.     Plan:  The POC is discharged as of today.    Rehab Discharge Reason:  "Achieved all and/or the most significant goals(s)  Treatments:  Re-check: 02/19/25  Auth:  Precautions: None  Therapeutic ex:  Stepper 5 min  IB, HS stretch, runners stretch 1 min ea  HR and TR 4\" step 3 x 10   Step ups 6\" x 20 with opp knee raise  Step downs 4\" x 20  Shuttle DL 5 bands x 30, SL 4 bands x 30  Hip abd and ext GTB x 20  Ankle 4 ways GTB x 20 hELD  Applied kinesiotape 2 min  Neuro Re-ed:  Wobble board 2 min ea  Airex slow march 2 min   Tandem 1 min ea HELD   Lunge to bosu x 20 (difficult)  Manual:  Pt educated regarding manual therapy risks and benefits.  Pt given opportunity to stop if needed.  Pt aware and agrees.   IASTM to calf to reduce tissue tightness  Access Code: [email or web address]  URL: [email or web address]  Date: 01/24/2025  Prepared by: Elio KEN program given  Goals:  Active       PT Problem       Pt will have pain no higher than 2/10 for at least 2 weeks  (Met)       Start:  01/17/25    Expected End:  04/17/25    Resolved:  03/05/25         Pt will have AROM of B ankle to at least 10 deg of DF to allow the pt to amb without pain   (Progressing)       Start:  01/17/25    Expected End:  04/17/25            Pt will have strength of at least 4+/5 of B ankle to allow the pt to walk, run without difficulty  (Met)       Start:  01/17/25    Expected End:  04/17/25    Resolved:  03/05/25         LEFS  to 36 (Met)       Start:  01/17/25    Expected End:  04/17/25    Resolved:  03/05/25         Pt will be able to walk 2 mi without increased heel pain (Met)       Start:  01/17/25    Expected End:  04/17/25    Resolved:  03/05/25                  "

## 2025-03-09 RX ORDER — OMEGA-3-ACID ETHYL ESTERS 1 G/1
CAPSULE, LIQUID FILLED ORAL
Qty: 270 CAPSULE | Refills: 1 | Status: SHIPPED | OUTPATIENT
Start: 2025-03-09

## 2025-03-10 NOTE — PROGRESS NOTES
Patient ID: Elpidio Jamil is a 75 y.o. male who presents for Follow-up (Pt is here today for a follow up. Pt has no concerns this visit. )    (LAST VISIT PLAN FROM: 11/19/2024:   him to be a fall risk. Htn stable  Cough much improved with gerd tx.  Has gi follow up  Mood disorder stable.  Referral for achilles tendon issue.  I am the Internal Medicine physician providing ongoing chronic medical care for this patient, which is managed during and in between office visits.  See patient instructions in wrap up plan, orders and comments for treatment plan.  Patient Instructions:  Medication changes by specialists noted, agree.  B12-high due to purchasing a higher than usual dose-can take that twice per week to use it up or get the 91427 mcg again and take daily.  Right shoulder, can try voltaren gel 2-4 grams three times per day and oral tylenol. Would not use oral meloxicam or other nsaid due to recent esophagus issues, for at least 3 more months. If not better, suggest a visit back with Dr Austin Polanco.  Left ankle: voltaren gel 2 grams three times per day . Ice after activity, agree with ankle support until you see ortho.Referral to Dr Miguel Ángel Mercado for left achilles, if no availability in near future we could refer to Dr Sami Rayo [facility], has a [facility] office.  Follow up 4 months, sooner if needed.  END LAST VISIT PLAN)  -------------------------------------------  HPI  Patient is a 75-year-old male who presents today for follow up.  Patient denies any concerns or problems today.    Since last visit, patient underwent XR Left Ankle and XR chest (see Objective).    Patient reports that he has seen ENT, Pulmonology, and presented to  twice (cough) since last visit.  He did the methacholine challenge which was WNL.  He was diagnosed with fungal laryngitis.  He was given medication for 14 days with follow up with ENT 03/26/2025.  I reviewed the notes.      He reports his Achilles issue has  resolved.  He was recommended a heel lift with stretching.  He is not running yet but it is improving.  He continues with PT.    Pulmonology diagnosed him with mild restrictive airway disease and allergic rhinitis. Patient reports that his albuterol inhaler did not last long.  I sent a message to the pharmacy to dispense albuterol for use in the future if needed. Patient reports that he was not diagnosed with asthma.    He did undergo allergy testing which showed allergy to various grasses.  Patient states he has symptoms of sneezing which are mild, currently.  He does report dry mouth.  We discussed stopping one of his antihistamines.  Recommended checking with ENT at his next visit, if all clear with fungal laryngitis, if he could stop the cetirizine (generic)/ Zyrtec and move Xyzal (levocetirizine) to the AM, and add montelukast /Singulair 10 mg once every evening.  Patient will discuss with ENT at his next visit. Advised to mask when mowing the grass.    Patient has an appointment with GI,  Dr. Dove on 05/02/2025.    Drowsiness:  Patient reports that he was prescribed Xyzal 5 mg and Zyrtec 10 mg daily which make him drowsy.  We discussed that taking two antihistamines can do this.  Patient has been taking this since approximately January.  We discussed switching his Zyrtec to Singulair.  Patient will check with ENT at his next visit.    Lightheadedness:  Patient reports lightheadedness if he does not eat.  Resolves with eating.  He states that this has been ongoing for a long time.  Recommended adequate hydration of 64 ounces of water per day.  We discussed that he is taking a few medications that can cause this and adequate hydration is important. We discussed at least 16 ounces of water prior to going outside to do activities and adding protein to his breakfast instead of just plain toast.    PVC:  BP today is 116/77.  Follow up on cardiovascular conditions:  Patient continues on aspirin 81 mg daily and  atenolol 12.5 mg daily.  Cardiac:   Chest pain?No  Palpitations? No  Increased mcguire?  No  Other:  Resp:   Shortness of breath?No  Wheezing No  Cough No  Other:  Extremities:   Leg or ankle swelling?No   Claudication?No  Other:  Neuro:  DizzinessNo  SyncopeNo   Blurred visionNo  New headaches No  Other:  Medications:Taking them regularly.Yes  Side effects.No    Hypercholesterolemia:  No myalgias, nausea, abdominal pain.  Meds: Taking regularly, no adverse effect.  Patient continues on atorvastatin 40 mg daily.  Labs:  Last LDL was 78 mg/dL with triglycerides at 178 mg/dL on 10/10/2024.  LDL goal: < 70 mg/dL.  Moderate coronary artery calcifications involving left main, lad, and left circumflex vessels on CT Chest 08/19/2024. ASCVD risk 20.5%.    GERD:  Stable.  Patient continues on Prevacid 30 mg daily.    Anxiety:  Depression:  Stable.  Patient continues on Prozac 50 mg daily.  Patient reports mildly increased anxiety but is manageable.  Recommended regular walking and exercise.    BPH:  Stable.  Patient continues on VESIcare 5 mg daily.  Patient reports improvement in his symptoms.  Patient was taking Flomax as well at one point but is not longer taking this.  He follows with Urology, Dr. Saunders.    Insomnia:  Stable.  Patient continues on trazodone 50 mg nightly.      Prediabetes:  A1c today was 6.1%.  Prior A1c was 6.1% with glucose at 113 mg/dL on 10/10/2024.    Elevated B12:  Last B12 was > 2000 on 10/10/2024.  Patient continues on B12 supplementation at 1000 mcg five times weekly.    Orders placed for prescriptions as required.    Follow up 06/18/2025.    Review of Systems  See ROS in HPI    Current Outpatient Medications   Medication Instructions    albuterol 90 mcg/actuation inhaler 2 puffs, inhalation, Every 6 hours PRN    aspirin 81 mg, Daily    atenolol (TENORMIN) 12.5 mg, oral, Daily    atorvastatin (LIPITOR) 40 mg, oral, Nightly    CALCIUM CITRATE-VITAMIN D3 ORAL 1 tablet, Daily    cetirizine (ZYRTEC) 10  mg, oral, Nightly    cyanocobalamin, vitamin B-12, 1,000 mcg capsule 1 capsule, 5 times weekly    diclofenac sodium (VOLTAREN) 2 g, Topical, 3 times daily    FLUoxetine (PROzac) 40 mg capsule TAKE 1 CAPSULE BY MOUTH ONCE  DAILY WITH 10 MG TO MAKE 50 MG    FLUoxetine (PROZAC) 10 mg, oral, Daily, with 40mg to equal 50mg daily    fluticasone (Flonase) 50 mcg/actuation nasal spray 2 sprays, Each Nostril, 2 times daily    lansoprazole (Prevacid) 30 mg DR capsule Take one cap daily  30 minutes ac    levocetirizine (XYZAL) 5 mg, oral, Every evening    montelukast (SINGULAIR) 10 mg, oral, Nightly    omega-3 acid ethyl esters (Lovaza) 1 gram capsule Take one cap in the am. And 2 caps in the pm.    psyllium husk-calcium (Metamucil Plus Calcium) 1-60 gram-mg capsule 5 capsules, Daily    solifenacin (VESICARE) 5 mg, oral, Daily, Swallow tablet whole; do not crush, chew, or split.    traZODone (DESYREL) 50 mg, oral, Nightly     Allergies   Allergen Reactions    Nirmatrelvir-Ritonavir Nausea/vomiting     AKA.. Paxlovid     Objective    The following test results have been reviewed:  XR Left Ankle 12/09/2024:  IMPRESSION:  Posterior calcaneal enthesophyte without osseous injury evident.  MACRO:  None  Signed by: Damien White 12/10/2024 9:22 AM  Dictation workstation:   [id number]    XR Chest 12/14/2024:  FINDINGS:  Cardiac silhouette is stable in size and morphology. Lungs are clear  without consolidative airspace opacities. No pleural effusions or  pneumothorax. No acute osseous findings.  IMPRESSION:  No acute cardiopulmonary process.  MACRO:  None  Signed by: Christina Batres 12/14/2024 11:13 AM  Dictation workstation:   [id number]    Latest Complete Lab Results:  CBC:   Lab Results   Component Value Date    WBC 6.0 12/17/2024    RBC 4.54 12/17/2024    HGB 14.0 12/17/2024    HCT 42.8 12/17/2024    MCV 94 12/17/2024    MCH 30.8 12/17/2024    MCHC 32.7 12/17/2024     12/17/2024    MPV 9.1 10/05/2023     CMP:  Lab  "Results   Component Value Date    GLUCOSE 113 (H) 10/10/2024     10/10/2024    K 4.2 10/10/2024     10/10/2024    CO2 31 10/10/2024    ANIONGAP 12 10/10/2024    BUN 12 10/10/2024    CREATININE 0.80 10/10/2024    CALCIUM 9.8 10/10/2024    ALBUMIN 4.8 10/10/2024    ALKPHOS 53 10/10/2024    PROT 7.4 10/10/2024    AST 17 10/10/2024    BILITOT 0.9 10/10/2024    ALT 24 10/10/2024      Lipid:   Lab Results   Component Value Date    CHOL 139 10/10/2024    HDL 39.6 10/10/2024    CHHDL 3.5 10/10/2024    LDLF 61 03/09/2023    VLDL 36 10/10/2024    TRIG 178 (H) 10/10/2024     LDL Direct:  Lab Results   Component Value Date    LDLDIRECT 78 10/10/2024      TSH:   Lab Results   Component Value Date    TSH 1.16 10/10/2024      B12:  Lab Results   Component Value Date    GRGEIYMO80 >2,000 (H) 10/10/2024     Vitamin D:  Lab Results   Component Value Date    VITD25 39 07/26/2021      HgA1c:   Lab Results   Component Value Date    HGBA1C 6.1 03/19/2025    UKWABTRX3J 128 10/10/2024     PSA:   Lab Results   Component Value Date    PSA 0.25 10/10/2024        Physical Exam  Vitals:      11/19/2024     3:56 PM 12/5/2024    12:59 PM 12/10/2024     9:11 AM 12/14/2024    10:49 AM 1/16/2025    12:09 PM 2/5/2025    10:36 AM 3/19/2025     1:40 PM   Vitals   Systolic 110 130 132 126 108 116 116   Diastolic 63 80 79 75 67 76 77   Heart Rate 55 58 67 62 62 58 63   Temp  36.2 °C (97.2 °F) 36.7 °C (98.1 °F) 35.6 °C (96.1 °F) 36.2 °C (97.1 °F)     Resp 16  18 16 16     Height 1.778 m (5' 10\") 1.778 m (5' 10\") 1.778 m (5' 10\")  1.778 m (5' 10\") 1.778 m (5' 10\") 1.778 m (5' 10\")   Weight (lb) 196.8 196 200 197 200.4 200 198.4   BMI 28.24 kg/m2 28.12 kg/m2 28.7 kg/m2 28.27 kg/m2 28.75 kg/m2 28.7 kg/m2 28.47 kg/m2   BSA (m2) 2.1 m2 2.1 m2 2.12 m2 2.1 m2 2.12 m2 2.12 m2 2.11 m2     Patient looks their usual self, is known to me, and is in no obvious distress.  HEENT:   Normocephalic, no facial asymmetry  Eyes: Sclera and conjunctiva are " clear.  Neck: No adenopathy cervical (Ant/post/lat), no Supraclavicular nodes.   Thyroid normal.  Carotid pulses normal, no carotid bruits.  Lungs : RR normal. Clear to auscultation anterior, posterior and lateral. No rales, wheezes rhonchi or rubs. Good air exchange.  Heart: RRR. No Murmur, gallop, click or rub.  Abdomen: Bowel sounds normal, No bruits. No pulsatile mass. No hepatosplenomegaly, masses or tenderness. Soft, no guarding.  Vascular:  Posterior tibialis and dorsalis pedis pulses within normal limits bilaterally.   Extremities: No upper extremity edema. No lower extremity edema.   Musculoskeletal: No synovitis of joints seen. No new deformity.  Neuro: CN 2-12 intact. Alert, appropriate.  Ambulates independently.  No gross motor deficit.   No tremors.  Psych: normal mood and affect.  Skin: No rash, bruising petechiae or jaundice.    Elpidio was seen today for follow-up.  Diagnoses and all orders for this visit:  PVC (premature ventricular contraction) (Primary)  Hypercholesterolemia  Gastroesophageal reflux disease, unspecified whether esophagitis present  Benign prostatic hyperplasia with urinary obstruction  Anxiety disorder, unspecified type  Depression, major, single episode, mild ([facility])  -     traZODone (Desyrel) 50 mg tablet; Take 1 tablet (50 mg) by mouth once daily at bedtime.  Primary insomnia  Pre-diabetes  -     POCT glycosylated hemoglobin (Hb A1C) manually resulted  Elevated triglycerides with high cholesterol  Calcification of coronary artery  Agatston coronary artery calcium score between 200 and 399  Urinary frequency  Chronic cough  -     montelukast (Singulair) 10 mg tablet; Take 1 tablet (10 mg) by mouth once daily at bedtime.  Acute cough  -     albuterol 90 mcg/actuation inhaler; Inhale 2 puffs every 6 hours if needed for wheezing.  Wheezing  -     montelukast (Singulair) 10 mg tablet; Take 1 tablet (10 mg) by mouth once daily at bedtime.  -     albuterol 90 mcg/actuation inhaler;  Inhale 2 puffs every 6 hours if needed for wheezing.  Allergic rhinitis due to grass pollen  -     montelukast (Singulair) 10 mg tablet; Take 1 tablet (10 mg) by mouth once daily at bedtime.         See patient instructions in wrap up plan, orders and comments for treatment plan.  Patient Instructions:   Recommend, if ENT says all clear with fungal laryngitis, would stop the cetirizine/generic zyrtec , move xyzal / levo cetirizine to am, and add montelukast /Singulair 10 mg once every evening.  Feel free to discuss this plan with ENT.You have a printed rx for the montelukast and there are plenty of refills on the xyzal from pulmonary med.   Let me know.    Follow up 3 months     Also prefer you keep an albuterol inhaler  around and refill sent.use if needed for coughing spasms.    Glad you do not have asthma.    Continue other meds.    Am : minimum 16 oz water plus whatever coffee you want. And not just toast, can do toast and peanut butter or almond butter. You need protein before going out to do activities.      This is for allergies, sinus issues, intermittent cough related to these issues.  I am the Internal Medicine physician providing ongoing chronic medical care for this patient, which is managed during and in between office visits.    Scribe Attestation  By signing my name below, ITess, Leo   attest that this documentation has been prepared under the direction and in the presence of Katiuska Helm MD.   43 minutes including face to face time, prepr, chart review.

## 2025-03-19 ENCOUNTER — APPOINTMENT (OUTPATIENT)
Dept: PRIMARY CARE | Facility: CLINIC | Age: 75
End: 2025-03-19
Payer: MEDICARE

## 2025-03-19 VITALS
HEART RATE: 63 BPM | HEIGHT: 70 IN | BODY MASS INDEX: 28.4 KG/M2 | DIASTOLIC BLOOD PRESSURE: 77 MMHG | WEIGHT: 198.4 LBS | SYSTOLIC BLOOD PRESSURE: 116 MMHG

## 2025-03-19 DIAGNOSIS — R35.0 URINARY FREQUENCY: ICD-10-CM

## 2025-03-19 DIAGNOSIS — E78.00 HYPERCHOLESTEROLEMIA: ICD-10-CM

## 2025-03-19 DIAGNOSIS — N13.8 BENIGN PROSTATIC HYPERPLASIA WITH URINARY OBSTRUCTION: ICD-10-CM

## 2025-03-19 DIAGNOSIS — I25.10 CALCIFICATION OF CORONARY ARTERY: ICD-10-CM

## 2025-03-19 DIAGNOSIS — F41.9 ANXIETY DISORDER, UNSPECIFIED TYPE: ICD-10-CM

## 2025-03-19 DIAGNOSIS — F51.01 PRIMARY INSOMNIA: ICD-10-CM

## 2025-03-19 DIAGNOSIS — R06.2 WHEEZING: ICD-10-CM

## 2025-03-19 DIAGNOSIS — F32.0 DEPRESSION, MAJOR, SINGLE EPISODE, MILD (CMS-HCC): ICD-10-CM

## 2025-03-19 DIAGNOSIS — K21.9 GASTROESOPHAGEAL REFLUX DISEASE, UNSPECIFIED WHETHER ESOPHAGITIS PRESENT: ICD-10-CM

## 2025-03-19 DIAGNOSIS — R05.3 CHRONIC COUGH: ICD-10-CM

## 2025-03-19 DIAGNOSIS — I49.3 PVC (PREMATURE VENTRICULAR CONTRACTION): Primary | ICD-10-CM

## 2025-03-19 DIAGNOSIS — E78.2 ELEVATED TRIGLYCERIDES WITH HIGH CHOLESTEROL: ICD-10-CM

## 2025-03-19 DIAGNOSIS — R73.03 PRE-DIABETES: ICD-10-CM

## 2025-03-19 DIAGNOSIS — J30.1 ALLERGIC RHINITIS DUE TO GRASS POLLEN: ICD-10-CM

## 2025-03-19 DIAGNOSIS — R93.1 AGATSTON CORONARY ARTERY CALCIUM SCORE BETWEEN 200 AND 399: ICD-10-CM

## 2025-03-19 DIAGNOSIS — N40.1 BENIGN PROSTATIC HYPERPLASIA WITH URINARY OBSTRUCTION: ICD-10-CM

## 2025-03-19 DIAGNOSIS — R05.1 ACUTE COUGH: ICD-10-CM

## 2025-03-19 LAB — POC HEMOGLOBIN A1C: 6.1 % (ref 4.2–6.5)

## 2025-03-19 PROCEDURE — 1157F ADVNC CARE PLAN IN RCRD: CPT | Performed by: INTERNAL MEDICINE

## 2025-03-19 PROCEDURE — 1159F MED LIST DOCD IN RCRD: CPT | Performed by: INTERNAL MEDICINE

## 2025-03-19 PROCEDURE — G2211 COMPLEX E/M VISIT ADD ON: HCPCS | Performed by: INTERNAL MEDICINE

## 2025-03-19 PROCEDURE — 3008F BODY MASS INDEX DOCD: CPT | Performed by: INTERNAL MEDICINE

## 2025-03-19 PROCEDURE — 83036 HEMOGLOBIN GLYCOSYLATED A1C: CPT | Performed by: INTERNAL MEDICINE

## 2025-03-19 PROCEDURE — 1123F ACP DISCUSS/DSCN MKR DOCD: CPT | Performed by: INTERNAL MEDICINE

## 2025-03-19 PROCEDURE — 1160F RVW MEDS BY RX/DR IN RCRD: CPT | Performed by: INTERNAL MEDICINE

## 2025-03-19 PROCEDURE — 99215 OFFICE O/P EST HI 40 MIN: CPT | Performed by: INTERNAL MEDICINE

## 2025-03-19 PROCEDURE — 1126F AMNT PAIN NOTED NONE PRSNT: CPT | Performed by: INTERNAL MEDICINE

## 2025-03-19 PROCEDURE — 1158F ADVNC CARE PLAN TLK DOCD: CPT | Performed by: INTERNAL MEDICINE

## 2025-03-19 RX ORDER — ALBUTEROL SULFATE 90 UG/1
2 INHALANT RESPIRATORY (INHALATION) EVERY 6 HOURS PRN
Qty: 18 G | Refills: 0 | Status: SHIPPED | OUTPATIENT
Start: 2025-03-19 | End: 2026-03-19

## 2025-03-19 RX ORDER — OMEGA-3-ACID ETHYL ESTERS 1 G/1
CAPSULE, LIQUID FILLED ORAL
Qty: 270 CAPSULE | Refills: 1 | Status: CANCELLED | OUTPATIENT
Start: 2025-03-19

## 2025-03-19 RX ORDER — TRAZODONE HYDROCHLORIDE 50 MG/1
50 TABLET ORAL NIGHTLY
Qty: 90 TABLET | Refills: 3 | Status: SHIPPED | OUTPATIENT
Start: 2025-03-19

## 2025-03-19 RX ORDER — MONTELUKAST SODIUM 10 MG/1
10 TABLET ORAL NIGHTLY
Qty: 30 TABLET | Refills: 5 | Status: SHIPPED | OUTPATIENT
Start: 2025-03-19 | End: 2025-09-15

## 2025-03-19 RX ORDER — SOLIFENACIN SUCCINATE 5 MG/1
5 TABLET, FILM COATED ORAL DAILY
Qty: 90 TABLET | Refills: 3 | Status: CANCELLED | OUTPATIENT
Start: 2025-03-19 | End: 2026-03-19

## 2025-03-19 ASSESSMENT — PATIENT HEALTH QUESTIONNAIRE - PHQ9
SUM OF ALL RESPONSES TO PHQ9 QUESTIONS 1 AND 2: 0
2. FEELING DOWN, DEPRESSED OR HOPELESS: NOT AT ALL
1. LITTLE INTEREST OR PLEASURE IN DOING THINGS: NOT AT ALL

## 2025-03-19 ASSESSMENT — PAIN SCALES - GENERAL: PAINLEVEL_OUTOF10: 0-NO PAIN

## 2025-03-19 NOTE — PATIENT INSTRUCTIONS
Recommend, if ENT says all clear with fungal laryngitis, would stop the cetirizine/generic zyrtec , move xyzal / levo cetirizine to am, and add montelukast /Singulair 10 mg once every evening.  Feel free to discuss this plan with ENT.You have a printed rx for the montelukast and there are plenty of refills on the xyzal from pulmonary [facility].   Let me know.    Follow up 3 months     Also prefer you keep an albuterol inhaler  around and refill sent.use if needed for coughing spasms.    Glad you do not have asthma.    Continue other meds.    Am : minimum 16 oz water plus whatever coffee you want. And not just toast, can do toast and peanut butter or almond butter. You need protein before going out to do activities.      This is for allergies, sinus issues, intermittent cough related to these issues.   difficulty remembering

## 2025-03-26 ENCOUNTER — APPOINTMENT (OUTPATIENT)
Dept: OTOLARYNGOLOGY | Facility: CLINIC | Age: 75
End: 2025-03-26
Payer: MEDICARE

## 2025-03-26 VITALS — BODY MASS INDEX: 28.35 KG/M2 | WEIGHT: 198 LBS | HEIGHT: 70 IN

## 2025-03-26 DIAGNOSIS — R05.3 CHRONIC COUGH: ICD-10-CM

## 2025-03-26 DIAGNOSIS — J34.3 HYPERTROPHY OF BOTH INFERIOR NASAL TURBINATES: ICD-10-CM

## 2025-03-26 DIAGNOSIS — J31.0 CHRONIC RHINITIS: ICD-10-CM

## 2025-03-26 DIAGNOSIS — J34.2 NASAL SEPTAL DEVIATION: ICD-10-CM

## 2025-03-26 DIAGNOSIS — H90.3 SENSORINEURAL HEARING LOSS (SNHL) OF BOTH EARS: ICD-10-CM

## 2025-03-26 DIAGNOSIS — J34.89 NASAL DRAINAGE: ICD-10-CM

## 2025-03-26 DIAGNOSIS — K21.9 LARYNGOPHARYNGEAL REFLUX (LPR): Primary | ICD-10-CM

## 2025-03-26 DIAGNOSIS — R09.81 NASAL CONGESTION: ICD-10-CM

## 2025-03-26 DIAGNOSIS — B49 FUNGAL INFECTION: ICD-10-CM

## 2025-03-26 DIAGNOSIS — J04.0 ACUTE LARYNGITIS: ICD-10-CM

## 2025-03-26 PROCEDURE — 1159F MED LIST DOCD IN RCRD: CPT | Performed by: STUDENT IN AN ORGANIZED HEALTH CARE EDUCATION/TRAINING PROGRAM

## 2025-03-26 PROCEDURE — 1157F ADVNC CARE PLAN IN RCRD: CPT | Performed by: STUDENT IN AN ORGANIZED HEALTH CARE EDUCATION/TRAINING PROGRAM

## 2025-03-26 PROCEDURE — 99214 OFFICE O/P EST MOD 30 MIN: CPT | Performed by: STUDENT IN AN ORGANIZED HEALTH CARE EDUCATION/TRAINING PROGRAM

## 2025-03-26 PROCEDURE — 31231 NASAL ENDOSCOPY DX: CPT | Performed by: STUDENT IN AN ORGANIZED HEALTH CARE EDUCATION/TRAINING PROGRAM

## 2025-03-26 PROCEDURE — 3008F BODY MASS INDEX DOCD: CPT | Performed by: STUDENT IN AN ORGANIZED HEALTH CARE EDUCATION/TRAINING PROGRAM

## 2025-03-26 PROCEDURE — 1036F TOBACCO NON-USER: CPT | Performed by: STUDENT IN AN ORGANIZED HEALTH CARE EDUCATION/TRAINING PROGRAM

## 2025-03-26 NOTE — PROGRESS NOTES
Assessment  Coughing  Fungal laryngitis  Laryngopharyngeal reflux  Chronic rhinitis  Bilateral sensorineural hearing loss     Plan  -Coughing, fungal laryngitis, LPR  Previously noted fungal laryngitis has resolved.  Dietary modifications for reflux control in addition to OTC Gaviscon use discussed.  Takes lansoprazole in the mornings which he will continue    - CR  The patient and I discussed their current nasal / sinus symptoms as well as several therapeutic options.   Sinonasal symptomatology is controlled  He will continue his current nasal medical regimen consisting of daily Flonase use.  He was advised not to inhale the topical steroid.   The patient was instructed on the correct technique to administer the topical nasal spray (s) aiming at the lateral nasal wall for maximal efficacy and to avoid irritation to the septum which could lead to epistaxis. I stressed consistency of usage for maximal benefit. We discussed the rationale for the timing of this therapy and the benefits and potential adverse effects.        - Bilateral sensorineural hearing loss  MRI 6/24/22: No CPA/ IAC lesions/ masses   Audiogram 6/17/24 notable for bilateral sensorineural hearing loss with slight progression in his left ear at 3000 Hz, otherwise minimal changes.  Using hearing aids with benefit  We will monitor his hearing with yearly audiograms    RTC 6m           History of Present Illness  3/26/25  The patient presents for follow-up, reports significant improvement of his coughing/throat clearing.  Sinonasal symptomatology is controlled  2/5/25  Develop bilateral cerumen buildup, otherwise no otologic complaints.  In addition the patient endorses increased episodes of coughing/throat clearing for the past 4 weeks.  Has been using inhalers for lower airway reactivity/asthma.  Was evaluated in the ED earlier this month, CXR showed no acute cardiopulmonary process.   Has a history of pulmonary granulomatosis.  Was evaluated by  pulmonology, normal spirometry.    In addition the patient has a history of chronic rhinitis for which he uses Flonase.  Denies current facial pressure, congestion or nasal drainage.  Has a long history of reflux for which she uses lansoprazole in the morning.  Endorses throat clearing and occasional heartburn.  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  Denies dysphagia, odynophagia, fevers, chills, night sweats, or bleeding/hemoptysis.  6/17/24  The patient present for follow-up, endorses right cerumen buildup, otherwise no otologic complaints.  Using hearing aids with benefit.  8/16/22 telehealth visit  MRI IAC reviewed with the patient, no evidence of CN VII/VIII pathology noted. Currently using hearing aids with benefit. No otologic complaints.  Recall   72-year-old male presenting for evaluation of hearing loss and right cerumen impaction. The patient has a history of bilateral sensorineural hearing loss, uses hearing aids with benefits. He is due for his yearly audiogram. The patient reports cerumen buildup along his right EAC for several months which can interfere with his hearing aid use. No other otologic complaint.  Denies ear pain, vertigo, itching, discharge from ear, aural fullness or autophony.   Denies past otological history including history of prior ear surgery, noise exposure, exposure to ototoxic drugs or agents, and/or family history of hearing loss.    Past Medical History:   Diagnosis Date    Abdominal distension (gaseous) 03/23/2022    Abdominal bloating    Abnormal findings on diagnostic imaging of heart and coronary circulation     Abnormal computed tomography angiography of heart    Acute upper respiratory infection, unspecified 08/03/2022    Upper respiratory infection with cough and congestion    Anemia     Anxiety     Arrhythmia     Arthritis     Benign neoplasm of colon, unspecified 12/11/2012    Benign neoplasm of large intestine    Benign paroxysmal vertigo,  unspecified ear 03/01/2017    BPV (benign positional vertigo)    Benign prostatic hyperplasia without lower urinary tract symptoms 08/05/2021    Benign prostatic hyperplasia without lower urinary tract symptoms    Body mass index (BMI) 27.0-27.9, adult 08/22/2019    BMI 27.0-27.9,adult    Body mass index (BMI) 28.0-28.9, adult 10/28/2021    BMI 28.0-28.9,adult    Cataract     COVID-19 11/10/2022    COVID-19 virus infection    COVID-19 virus infection 03/14/2023    Eczema     Encounter for follow-up examination after completed treatment for conditions other than malignant neoplasm 08/29/2018    Hospital discharge follow-up    Encounter for immunization 08/22/2019    Need for shingles vaccine    Encounter for immunization 06/28/2017    Need for Tdap vaccination    Encounter for screening for malignant neoplasm of prostate 11/22/2014    Special screening examination for neoplasm of prostate    Encounter for screening for malignant neoplasm of prostate 07/08/2018    Screening for prostate cancer    Encounter for screening for other viral diseases 08/05/2021    Need for hepatitis C screening test    GERD (gastroesophageal reflux disease)     HL (hearing loss)     Impacted cerumen, bilateral 09/10/2020    Excessive ear wax, bilateral    Low back pain, unspecified 01/11/2022    Acute lumbar back pain    Muscle spasm of back 01/11/2022    Muscle spasm of back    Other conditions influencing health status     Pneumonia    Other enthesopathies, not elsewhere classified 03/09/2015    Tendonitis of shoulder    Other enthesopathy of right foot and ankle 12/07/2021    Tendinitis of right foot    Other fecal abnormalities 03/09/2015    Heme positive stool    Other muscle spasm 09/04/2018    Trapezius muscle spasm    Other prurigo 04/06/2021    Pruritic rash    Other skin changes     Skin irritation    Other skin changes due to chronic exposure to nonionizing radiation 03/29/2016    Sun-damaged skin    Other specified symptoms and  signs involving the circulatory and respiratory systems 11/10/2022    Rales    Pain in left knee 09/03/2021    Knee pain, left    Pain in right knee 12/01/2020    Pain and swelling of right knee    Palpitations     Heart palpitations    Palpitations 01/31/2023    Personal history of colonic polyps     History of colonic polyps    Personal history of diseases of the blood and blood-forming organs and certain disorders involving the immune mechanism 11/20/2013    History of anemia    Personal history of diseases of the skin and subcutaneous tissue     History of dermatitis    Personal history of diseases of the skin and subcutaneous tissue 10/28/2021    History of skin pruritus    Personal history of other diseases of the circulatory system     History of mitral valve disorder    Personal history of other diseases of the digestive system     History of gastroesophageal reflux (GERD)    Personal history of other diseases of the digestive system 02/14/2019    History of esophageal stricture    Personal history of other diseases of the respiratory system 06/07/2019    History of paranasal sinus congestion    Personal history of other diseases of the respiratory system 11/10/2022    History of pleural effusion    Personal history of other drug therapy 05/21/2018    History of pneumococcal vaccination    Personal history of other drug therapy 10/14/2016    History of influenza vaccination    Personal history of other endocrine, nutritional and metabolic disease     History of high cholesterol    Personal history of other endocrine, nutritional and metabolic disease 08/22/2019    History of iron deficiency    Personal history of other medical treatment     History of nuclear stress test    Personal history of other specified conditions 09/04/2018    History of chest pain    Personal history of other specified conditions 06/08/2021    History of headache    Personal history of other specified conditions 05/14/2019    History  of chronic cough    Personal history of other specified conditions 02/12/2019    History of dysphagia    Personal history of other specified conditions 10/08/2015    History of hoarseness    Personal history of other specified conditions 11/10/2022    History of wheezing    Personal history of pneumonia (recurrent) 11/10/2022    History of pneumonia    Personal history of pneumonia (recurrent)     History of pneumonia    Pleural effusion 03/14/2023    Pneumonia 03/14/2023    Pruritus ani 08/23/2016    Perianal irritation    Pyuria 08/22/2019    Pyuria    Rales 03/14/2023    Shingles     Sleep apnea     Superficial mycosis, unspecified 10/08/2015    Fungal dermatitis    Unspecified abdominal hernia without obstruction or gangrene     Hernia    Varicella     Ventricular premature depolarization 10/19/2021    PVC (premature ventricular contraction)    Wheezing 03/14/2023    Zoster without complications 08/10/2018    Herpes zoster without complication       Past Surgical History:   Procedure Laterality Date    CIRCUMCISION, PRIMARY      COLONOSCOPY  04/26/2012    Colonoscopy (Fiberoptic)    ESOPHAGOGASTRODUODENOSCOPY  01/08/2015    Diagnostic Esophagogastroduodenoscopy    EYE SURGERY      HERNIA REPAIR  06/29/2015    Hernia Repair    HERNIA REPAIR  04/26/2012    Inguinal Hernia Repair    OTHER SURGICAL HISTORY  02/12/2019    Myringotomy    OTHER SURGICAL HISTORY  01/08/2015    Repair Of Esophageal Stricture    OTHER SURGICAL HISTORY  12/07/2021    Skin lesion excision    OTHER SURGICAL HISTORY  12/09/2021    Mohs surgery    OTHER SURGICAL HISTORY  04/26/2012    Evaluation Of Swallowing And Oral Function    OTHER SURGICAL HISTORY  04/26/2012    Chest Tube Insertion         Current Outpatient Medications on File Prior to Visit   Medication Sig Dispense Refill    albuterol 90 mcg/actuation inhaler Inhale 2 puffs every 6 hours if needed for wheezing. 18 g 0    aspirin 81 mg EC tablet Take 1 tablet (81 mg) by mouth once  daily. with food usually in the am      atenolol (Tenormin) 25 mg tablet Take 0.5 tablets (12.5 mg) by mouth once daily. 45 tablet 3    atorvastatin (Lipitor) 40 mg tablet Take 1 tablet (40 mg) by mouth once daily at bedtime. 90 tablet 3    CALCIUM CITRATE-VITAMIN D3 ORAL Take 1 tablet by mouth 1 (one) time each day.      cetirizine (ZYRTEC) 10 mg capsule Take 1 capsule (10 mg) by mouth once daily at bedtime. 90 capsule 3    cyanocobalamin, vitamin B-12, 1,000 mcg capsule Take 1 capsule by mouth 5 (five) times a week.      diclofenac sodium (Voltaren) 1 % gel Apply 2.25 inches (2 g) topically 3 times a day.      FLUoxetine (PROzac) 10 mg capsule Take 1 capsule (10 mg) by mouth once daily. with 40mg to equal 50mg daily 90 capsule 3    FLUoxetine (PROzac) 40 mg capsule TAKE 1 CAPSULE BY MOUTH ONCE  DAILY WITH 10 MG TO MAKE 50 MG 90 capsule 3    fluticasone (Flonase) 50 mcg/actuation nasal spray Administer 2 sprays into each nostril 2 times a day. 64 g 3    lansoprazole (Prevacid) 30 mg DR capsule Take one cap daily  30 minutes ac 90 capsule 3    levocetirizine (Xyzal) 5 mg tablet Take 1 tablet (5 mg) by mouth once daily in the evening. 30 tablet 11    montelukast (Singulair) 10 mg tablet Take 1 tablet (10 mg) by mouth once daily at bedtime. 30 tablet 5    omega-3 acid ethyl esters (Lovaza) 1 gram capsule Take one cap in the am. And 2 caps in the pm. 270 capsule 1    psyllium husk-calcium (Metamucil Plus Calcium) 1-60 gram-mg capsule Take 5 capsules by mouth 1 (one) time each day.      solifenacin (VESIcare) 5 mg tablet Take 1 tablet (5 mg) by mouth once daily. Swallow tablet whole; do not crush, chew, or split. 90 tablet 3    traZODone (Desyrel) 50 mg tablet Take 1 tablet (50 mg) by mouth once daily at bedtime. 90 tablet 3     No current facility-administered medications on file prior to visit.        Allergies   Allergen Reactions    Nirmatrelvir-Ritonavir Nausea/vomiting     AKA.. Paxlovid        Review of  Systems  A detailed 12 point ROS was performed and is negative except as noted in the intake form, HPI and/or Past Medical History        Physical Exam   CONSTITUTIONAL: Well developed, well nourished.  VOICE: Normal voice quality  RESPIRATION: Breathing comfortably, no stridor.  CV: No clubbing/cyanosis/edema in hands.  EYES: EOM Intact, sclera normal.  NEURO: Alert and oriented times 3, Cranial nerves V,VII intact and symmetric bilaterally.  HEAD AND FACE: Symmetric facial features, no masses or lesions, sinuses nontender to palpation.  SALIVARY GLANDS: Parotid and submandibular glands normal bilaterally.  + EARS: Normal external ears  Right EAC patent, tympanic membrane intact  Left EAC patent, tympanic membrane intact   NOSE: External nose midline, anterior rhinoscopy is normal with limited visualization to the anterior aspect of the interior turbinates. No lesions noted.  ORAL CAVITY/OROPHARYNX/LIPS: Normal mucous membranes, normal floor of mouth/tongue/OP, no masses or lesions are noted.  PHARYNGEAL WALLS AND NASOPHARYNX: No masses noted. Mucosa appears clean and moist  NECK/LYMPH: No LAD, no thyroid masses. Trachea palpably midline  SKIN: Neck skin is without injury  PSYCH: Alert and oriented with appropriate mood and affect       Nasal endoscopy:  PROCEDURE NOTE    For better visualization because of septal deviation, turbinate hypertrophy nasal endoscopy was performed after verbal consent was obtained by the patient and/or guardian. Both nostrils were sprayed with a mixture of lidocaine 4% and Afrin. After a sufficient amount of time elapsed for mucosal anesthesia to take place, the nasal endoscope was advanced into the nostril.    The following areas were visualized:  Nasal passage, nasal septum, turbinates, middle meatus, nasopharynx, sinus ostia    The patient tolerated the procedure well and these structures were found to be normal except as follows:  Bilateral inferior turbinate hypertrophy  Left  septal deviation   No mucopurulence, polyps, nor masses seen in inferior meati, middle meati, nor sphenoethmoidal recesses bilaterally.     PROCEDURE NOTE:  FLEXIBLE NASOLARYNGOSCOPY     The risks, benefits, and alternatives were explained.  The patient wished to proceed and provided verbal consent.       INDICATIONS: To visualize anatomy in specific detail; evaluation of symptomatic disorder involving the voice, swallowing, or upper aerodigestive tract, including IVONNE.      DESCRIPTION OF PROCEDURE: After adequate afrin and lidocaine spray, I advanced the endoscope into the nasal cavity.  The nasal cavity, nasopharynx, tongue base, vallecula, posterior/lateral pharyngeal walls, pyriform, epiglottis, post cricoid area, and larynx were within normal limits.  The following specific findings should be noted:  No lesions/masses  Mobile TVCs bilaterally, complete glottic closure  No fungal elements noted  No pooling of secretions  The patient tolerated the procedure without difficulty.     Results:   Audiogram 6/17/24 reviewed  Right: Normal hearing up to 2000 Hz sloping to moderate sensorineural hearing loss.  Speech discrimination score 100%.  Type a tympanogram.  Left: Normal hearing up to 2000 Hz sloping to moderate sensorineural hearing loss at 4000 Hz improving to normal hearing at 8000 Hz.  Speech discrimination score 100%.  Type a tympanogram.

## 2025-03-26 NOTE — PATIENT INSTRUCTIONS
"REFLUX (GERD / LPR) INFORMATION SHEET    TYPES OF REFLUX:  *  Gastroesophageal Reflux (GERD):  Backflow of stomach contents (acid) into the esophagus     ·  Symptoms:  Heartburn, regurgitation, swallowing problems  *  Laryngopharyngeal (LPR):  Backflow of stomach contents into the voice box and throat     ·  Symptoms:  Hoarseness, nonproductive throat clearing, cough, swallowing problems, sensation of \"lump\" in the throat, sudden shortness of breath or choking sensation         (especially at night)      WHY DON'T I HAVE HEARTBURN?  Many patients with LPRD do not experience significant heartburn (65%).  Heartburn occurs when the tissue in the esophagus becomes irritated.  The lining in the larynx (voice box) and the upper throat does not have as strong a protective lining as the esophagus, therefore, it is more sensitive to stomach acid.     WHAT CAN I DO TO REDUCE REFLUX?  *   Reduce Stress:  Even a moderate amount of stress can dramatically increase the amount of reflux.    *   Diet:  Try to maintain a healthy body weight.  Eat sensible, moderate amounts.  Eat meals at least 3 hours before bedtime. Avoid bedtime snacks.  Certain foods have been         shown to cause reflux including:     ·  Spicy, acidic and greasy foods     ·  Tomato based foods (spaghetti sauce, Mexican foods, etc.)     ·  Acidic fruit and juices (orange, tomato, cranberry juice, grapefruit, etc.)     ·  Caffeine (tea, coffee, soda, chocolate)     ·  Alcohol     ·  Dairy products     ·  Peppermint    *   Bedtime:  Elevate the head of your bed with 4-6 inch blocks.  Do not elevate your head with extra pillows as this can worsen reflux.  If the entire head of the bed is tilted        upwards, gravity reduces the backflow of acid.   *   Clothing:  Avoid tight belts and other restrictive clothing  *   Smoking:  Avoid smoking and second-hand smoke as this dramatically increases reflux   *   Medications:  NSAIDS (ex. Advil/Motrin type meds), aspirin, " "steroids can aggravate reflux  *   Medical Therapy:     ·  H2 receptor antagonists - cimetidine, ranitidine, famotidine, etc.            o Absorption is reduced 10 to 20 percent by associated antacid administration (ex. Tums)            o Achieve less acid suppression than proton pump inhibitors     ·  Proton pump inhibitor - omeprazole, lansoprazole, pantoprazole, etc.            o PPI's most effective when taken 30 to 60 minutes before meals on an empty stomach            o PPI's can effect absorption of medications (Plavix, Coumadin, etc) so check with your pharmacist prior to initiating therapy for any interactions      ·  Alginates - Gaviscon and Gaviscon Advance              o Block the stomach lining by forming a physical barrier    Once on medical therapy for reflux it can take weeks or months for symptom improvement or resolution as underlying damaged tissue heals (think about a burn on your arm…will take a long time to completely heal).  Persistent symptoms despite appropriate, consistent medical therapy should prompt an evaluation by gastroenterology.        \"[facility] is pleased to meet your health care needs.  We strive to deliver the highest quality and most value based care possible.  Thank you for giving us the opportunity to serve you.\"   "

## 2025-05-02 ENCOUNTER — OFFICE (OUTPATIENT)
Dept: URBAN - METROPOLITAN AREA CLINIC 27 | Facility: CLINIC | Age: 75
End: 2025-05-02
Payer: MEDICARE

## 2025-05-02 VITALS
SYSTOLIC BLOOD PRESSURE: 109 MMHG | TEMPERATURE: 98.4 F | DIASTOLIC BLOOD PRESSURE: 73 MMHG | HEIGHT: 70 IN | WEIGHT: 197 LBS | HEART RATE: 53 BPM

## 2025-05-02 DIAGNOSIS — Z86.0101 PERSONAL HISTORY OF ADENOMATOUS AND SERRATED COLON POLYPS: ICD-10-CM

## 2025-05-02 DIAGNOSIS — K21.9 GASTRO-ESOPHAGEAL REFLUX DISEASE WITHOUT ESOPHAGITIS: ICD-10-CM

## 2025-05-02 PROCEDURE — 99213 OFFICE O/P EST LOW 20 MIN: CPT | Performed by: INTERNAL MEDICINE

## 2025-05-13 DIAGNOSIS — M75.31 CALCIFIC TENDINITIS OF RIGHT SHOULDER: ICD-10-CM

## 2025-05-13 DIAGNOSIS — M25.511 RIGHT SHOULDER PAIN, UNSPECIFIED CHRONICITY: ICD-10-CM

## 2025-05-13 DIAGNOSIS — S46.811A STRAIN OF RIGHT INFRASPINATUS TENDON, INITIAL ENCOUNTER: Primary | ICD-10-CM

## 2025-05-13 DIAGNOSIS — R93.6 ABNORMAL MRI, SHOULDER: ICD-10-CM

## 2025-05-14 ENCOUNTER — TELEPHONE (OUTPATIENT)
Dept: PRIMARY CARE | Facility: CLINIC | Age: 75
End: 2025-05-14
Payer: MEDICARE

## 2025-05-14 NOTE — TELEPHONE ENCOUNTER
85 I made him an apt with Dr Polanco and called him to give him the good news, but he has an apt with someone else at the [facility] already, so he is going there. He requested that I cancel the apt.

## 2025-05-16 ENCOUNTER — PATIENT MESSAGE (OUTPATIENT)
Dept: AUDIOLOGY | Facility: CLINIC | Age: 75
End: 2025-05-16
Payer: MEDICARE

## 2025-05-19 ENCOUNTER — CLINICAL SUPPORT (OUTPATIENT)
Dept: AUDIOLOGY | Facility: CLINIC | Age: 75
End: 2025-05-19
Payer: MEDICARE

## 2025-05-19 DIAGNOSIS — H90.3 SENSORINEURAL HEARING LOSS (SNHL) OF BOTH EARS: Primary | ICD-10-CM

## 2025-05-19 NOTE — PROGRESS NOTES
HEARING AID DROP OFF    RIGHT: Phonak Audeo M70-R SN: [id number]  : 2 x M  Wax Guard/Dome: Cerushield, Large open  LEFT: Phonak Audeo M70-R SN: [id number]  : 2 x M  Wax Guard/Dome: Cerushield, Large open     Repair Warranty: out of warranty 8/15/2023  L&D Warranty: out of warranty 8/15/2023  HA Office Visits: out of warranty 8/15/2023    Elpidio Jamil dropped off their hearing aids due to broken .  Visual inspection confirmed broken  of right hearing aid.  Hearing aid  changed and listening check revealed good working function of device.  Patient messaged and hearing aid placed at . Patient satisfied.    $100  Replacement (, H90.3)    Lissy Oliveros, CCC-A    Appt time: 11:30 AM - 12:00 PM

## 2025-05-22 ENCOUNTER — APPOINTMENT (OUTPATIENT)
Dept: ORTHOPEDIC SURGERY | Facility: CLINIC | Age: 75
End: 2025-05-22
Payer: MEDICARE

## 2025-06-18 ENCOUNTER — APPOINTMENT (OUTPATIENT)
Dept: PRIMARY CARE | Facility: CLINIC | Age: 75
End: 2025-06-18
Payer: MEDICARE

## 2025-06-18 VITALS
RESPIRATION RATE: 16 BRPM | DIASTOLIC BLOOD PRESSURE: 67 MMHG | BODY MASS INDEX: 28.2 KG/M2 | HEIGHT: 70 IN | HEART RATE: 62 BPM | SYSTOLIC BLOOD PRESSURE: 104 MMHG | WEIGHT: 197 LBS

## 2025-06-18 DIAGNOSIS — F41.9 ANXIETY DISORDER, UNSPECIFIED TYPE: ICD-10-CM

## 2025-06-18 DIAGNOSIS — N40.1 BENIGN PROSTATIC HYPERPLASIA (BPH) WITH URINARY URGENCY: ICD-10-CM

## 2025-06-18 DIAGNOSIS — R05.1 ACUTE COUGH: ICD-10-CM

## 2025-06-18 DIAGNOSIS — E78.2 ELEVATED TRIGLYCERIDES WITH HIGH CHOLESTEROL: ICD-10-CM

## 2025-06-18 DIAGNOSIS — N40.1 BENIGN PROSTATIC HYPERPLASIA WITH URINARY OBSTRUCTION: ICD-10-CM

## 2025-06-18 DIAGNOSIS — J30.1 SEASONAL ALLERGIC RHINITIS DUE TO POLLEN: ICD-10-CM

## 2025-06-18 DIAGNOSIS — F32.0 DEPRESSION, MAJOR, SINGLE EPISODE, MILD: ICD-10-CM

## 2025-06-18 DIAGNOSIS — Z00.00 ENCOUNTER FOR SUBSEQUENT ANNUAL WELLNESS VISIT (AWV) IN MEDICARE PATIENT: ICD-10-CM

## 2025-06-18 DIAGNOSIS — R05.3 CHRONIC COUGH: ICD-10-CM

## 2025-06-18 DIAGNOSIS — I49.3 PVC (PREMATURE VENTRICULAR CONTRACTION): ICD-10-CM

## 2025-06-18 DIAGNOSIS — Z79.899 ENCOUNTER FOR MONITORING STATIN THERAPY: ICD-10-CM

## 2025-06-18 DIAGNOSIS — E53.8 LOW SERUM VITAMIN B12: ICD-10-CM

## 2025-06-18 DIAGNOSIS — H90.3 SENSORINEURAL HEARING LOSS (SNHL) OF BOTH EARS: ICD-10-CM

## 2025-06-18 DIAGNOSIS — Z51.81 ENCOUNTER FOR MONITORING STATIN THERAPY: ICD-10-CM

## 2025-06-18 DIAGNOSIS — R73.03 PRE-DIABETES: ICD-10-CM

## 2025-06-18 DIAGNOSIS — H34.8112 CENTRAL RETINAL VEIN OCCLUSION, RIGHT EYE, STABLE: ICD-10-CM

## 2025-06-18 DIAGNOSIS — I10 BENIGN ESSENTIAL HYPERTENSION: ICD-10-CM

## 2025-06-18 DIAGNOSIS — R06.2 WHEEZING: ICD-10-CM

## 2025-06-18 DIAGNOSIS — F51.01 PRIMARY INSOMNIA: ICD-10-CM

## 2025-06-18 DIAGNOSIS — E78.00 HYPERCHOLESTEROLEMIA: Primary | ICD-10-CM

## 2025-06-18 DIAGNOSIS — N13.8 BENIGN PROSTATIC HYPERPLASIA WITH URINARY OBSTRUCTION: ICD-10-CM

## 2025-06-18 DIAGNOSIS — R73.09 ELEVATED HEMOGLOBIN A1C: ICD-10-CM

## 2025-06-18 DIAGNOSIS — J30.1 ALLERGIC RHINITIS DUE TO GRASS POLLEN: ICD-10-CM

## 2025-06-18 DIAGNOSIS — R39.15 BENIGN PROSTATIC HYPERPLASIA (BPH) WITH URINARY URGENCY: ICD-10-CM

## 2025-06-18 DIAGNOSIS — K21.9 GASTROESOPHAGEAL REFLUX DISEASE, UNSPECIFIED WHETHER ESOPHAGITIS PRESENT: ICD-10-CM

## 2025-06-18 PROCEDURE — 1125F AMNT PAIN NOTED PAIN PRSNT: CPT | Performed by: INTERNAL MEDICINE

## 2025-06-18 PROCEDURE — 3008F BODY MASS INDEX DOCD: CPT | Performed by: INTERNAL MEDICINE

## 2025-06-18 PROCEDURE — 1160F RVW MEDS BY RX/DR IN RCRD: CPT | Performed by: INTERNAL MEDICINE

## 2025-06-18 PROCEDURE — 99214 OFFICE O/P EST MOD 30 MIN: CPT | Performed by: INTERNAL MEDICINE

## 2025-06-18 PROCEDURE — 1159F MED LIST DOCD IN RCRD: CPT | Performed by: INTERNAL MEDICINE

## 2025-06-18 PROCEDURE — 3078F DIAST BP <80 MM HG: CPT | Performed by: INTERNAL MEDICINE

## 2025-06-18 PROCEDURE — 3074F SYST BP LT 130 MM HG: CPT | Performed by: INTERNAL MEDICINE

## 2025-06-18 PROCEDURE — G2211 COMPLEX E/M VISIT ADD ON: HCPCS | Performed by: INTERNAL MEDICINE

## 2025-06-18 RX ORDER — ALBUTEROL SULFATE 90 UG/1
2 INHALANT RESPIRATORY (INHALATION) EVERY 6 HOURS PRN
Qty: 18 G | Refills: 6 | Status: SHIPPED | OUTPATIENT
Start: 2025-06-18 | End: 2026-06-18

## 2025-06-18 RX ORDER — AZELASTINE 1 MG/ML
1 SPRAY, METERED NASAL 2 TIMES DAILY PRN
Qty: 30 ML | Refills: 12 | Status: SHIPPED | OUTPATIENT
Start: 2025-06-18 | End: 2026-06-18

## 2025-06-18 RX ORDER — LEVOCETIRIZINE DIHYDROCHLORIDE 5 MG/1
5 TABLET, FILM COATED ORAL EVERY EVENING
Qty: 90 TABLET | Refills: 3 | Status: SHIPPED | OUTPATIENT
Start: 2025-06-18 | End: 2026-06-18

## 2025-06-18 RX ORDER — MONTELUKAST SODIUM 10 MG/1
10 TABLET ORAL NIGHTLY
Qty: 90 TABLET | Refills: 3 | Status: SHIPPED | OUTPATIENT
Start: 2025-06-18

## 2025-06-18 ASSESSMENT — PAIN SCALES - GENERAL: PAINLEVEL_OUTOF10: 3

## 2025-06-18 NOTE — PATIENT INSTRUCTIONS
Stop the flonase due to history of fungal issues esophagus and sinus, after 4th of July .  Start azelastine nasal spray if noticing more congestion when you stop the flonase.  Follow up for Wellness visit/annual physical and review results. 3-4 months.  Azelastine can be taken twice daily regularly, or as needed.    Fasting bloodand urine test in 3 months, in the 2 weeks before the above visit.  Instructions for obtaining lab tests:   For fasting blood tests:  Please do not consume calories for 10-12 hours prior to this blood test. It is ok to drink water, you should be hydrated.  Please do not take vitamins, supplements or thyroid medication before your blood is drawn this day.   Most lab results should be available for your review on the  My Chart portal within 48 hours. Please contact the office if you have not seen or been given results of these tests within 2 weeks of having them done.

## 2025-07-04 DIAGNOSIS — R05.3 CHRONIC COUGH: ICD-10-CM

## 2025-07-04 DIAGNOSIS — F41.9 ANXIETY DISORDER, UNSPECIFIED TYPE: ICD-10-CM

## 2025-07-07 RX ORDER — FLUOXETINE HYDROCHLORIDE 40 MG/1
CAPSULE ORAL
Qty: 90 CAPSULE | Refills: 3 | Status: SHIPPED | OUTPATIENT
Start: 2025-07-07

## 2025-07-10 DIAGNOSIS — R05.3 CHRONIC COUGH: ICD-10-CM

## 2025-07-11 DIAGNOSIS — R05.3 CHRONIC COUGH: ICD-10-CM

## 2025-07-11 RX ORDER — LEVOCETIRIZINE DIHYDROCHLORIDE 5 MG/1
5 TABLET, FILM COATED ORAL EVERY EVENING
Qty: 90 TABLET | Refills: 3 | OUTPATIENT
Start: 2025-07-11 | End: 2026-07-11

## 2025-07-11 RX ORDER — LEVOCETIRIZINE DIHYDROCHLORIDE 5 MG/1
5 TABLET, FILM COATED ORAL EVERY EVENING
Qty: 90 TABLET | Refills: 0 | Status: CANCELLED | OUTPATIENT
Start: 2025-07-11 | End: 2026-07-11

## 2025-07-21 RX ORDER — LEVOCETIRIZINE DIHYDROCHLORIDE 5 MG/1
5 TABLET, FILM COATED ORAL EVERY EVENING
Qty: 90 TABLET | Refills: 3 | Status: SHIPPED | OUTPATIENT
Start: 2025-07-21 | End: 2026-07-21

## 2025-08-18 DIAGNOSIS — R35.0 URINARY FREQUENCY: ICD-10-CM

## 2025-08-19 RX ORDER — SOLIFENACIN SUCCINATE 5 MG/1
5 TABLET, FILM COATED ORAL DAILY
Qty: 90 TABLET | Refills: 3 | Status: SHIPPED | OUTPATIENT
Start: 2025-08-19 | End: 2026-08-19

## 2025-08-23 DIAGNOSIS — R05.3 CHRONIC COUGH: ICD-10-CM

## 2025-08-23 DIAGNOSIS — R06.2 WHEEZING: ICD-10-CM

## 2025-08-23 DIAGNOSIS — J30.1 ALLERGIC RHINITIS DUE TO GRASS POLLEN: ICD-10-CM

## 2025-08-25 RX ORDER — MONTELUKAST SODIUM 10 MG/1
10 TABLET ORAL NIGHTLY
Qty: 90 TABLET | Refills: 3 | Status: SHIPPED | OUTPATIENT
Start: 2025-08-25

## 2025-09-29 ENCOUNTER — APPOINTMENT (OUTPATIENT)
Dept: OTOLARYNGOLOGY | Facility: CLINIC | Age: 75
End: 2025-09-29
Payer: MEDICARE

## 2025-09-29 ENCOUNTER — APPOINTMENT (OUTPATIENT)
Dept: AUDIOLOGY | Facility: CLINIC | Age: 75
End: 2025-09-29
Payer: MEDICARE

## 2025-10-01 ENCOUNTER — APPOINTMENT (OUTPATIENT)
Dept: OTOLARYNGOLOGY | Facility: CLINIC | Age: 75
End: 2025-10-01
Payer: MEDICARE

## 2025-10-01 ENCOUNTER — APPOINTMENT (OUTPATIENT)
Dept: AUDIOLOGY | Facility: CLINIC | Age: 75
End: 2025-10-01
Payer: MEDICARE

## 2025-10-21 ENCOUNTER — APPOINTMENT (OUTPATIENT)
Dept: PRIMARY CARE | Facility: CLINIC | Age: 75
End: 2025-10-21
Payer: MEDICARE

## 2026-01-14 ENCOUNTER — APPOINTMENT (OUTPATIENT)
Dept: PRIMARY CARE | Facility: CLINIC | Age: 76
End: 2026-01-14
Payer: MEDICARE

## 2026-04-15 ENCOUNTER — APPOINTMENT (OUTPATIENT)
Dept: PRIMARY CARE | Facility: CLINIC | Age: 76
End: 2026-04-15
Payer: MEDICARE

## 2026-07-21 ENCOUNTER — APPOINTMENT (OUTPATIENT)
Dept: PRIMARY CARE | Facility: CLINIC | Age: 76
End: 2026-07-21
Payer: MEDICARE